# Patient Record
Sex: FEMALE | Race: WHITE | NOT HISPANIC OR LATINO | Employment: OTHER | URBAN - METROPOLITAN AREA
[De-identification: names, ages, dates, MRNs, and addresses within clinical notes are randomized per-mention and may not be internally consistent; named-entity substitution may affect disease eponyms.]

---

## 2017-01-04 ENCOUNTER — TRANSCRIBE ORDERS (OUTPATIENT)
Dept: LAB | Facility: CLINIC | Age: 72
End: 2017-01-04

## 2017-01-04 ENCOUNTER — APPOINTMENT (OUTPATIENT)
Dept: LAB | Facility: CLINIC | Age: 72
End: 2017-01-04
Payer: MEDICARE

## 2017-01-04 ENCOUNTER — ALLSCRIPTS OFFICE VISIT (OUTPATIENT)
Dept: OTHER | Facility: OTHER | Age: 72
End: 2017-01-04

## 2017-01-04 DIAGNOSIS — R19.7 DIARRHEA: ICD-10-CM

## 2017-01-04 LAB
ALBUMIN SERPL BCP-MCNC: 3.5 G/DL (ref 3.5–5)
ALP SERPL-CCNC: 67 U/L (ref 46–116)
ALT SERPL W P-5'-P-CCNC: 29 U/L (ref 12–78)
ANION GAP SERPL CALCULATED.3IONS-SCNC: 5 MMOL/L (ref 4–13)
AST SERPL W P-5'-P-CCNC: 27 U/L (ref 5–45)
BASOPHILS # BLD AUTO: 0.03 THOUSANDS/ΜL (ref 0–0.1)
BASOPHILS NFR BLD AUTO: 1 % (ref 0–1)
BILIRUB SERPL-MCNC: 0.45 MG/DL (ref 0.2–1)
BUN SERPL-MCNC: 21 MG/DL (ref 5–25)
CALCIUM SERPL-MCNC: 8.8 MG/DL (ref 8.3–10.1)
CHLORIDE SERPL-SCNC: 105 MMOL/L (ref 100–108)
CO2 SERPL-SCNC: 29 MMOL/L (ref 21–32)
CREAT SERPL-MCNC: 0.66 MG/DL (ref 0.6–1.3)
EOSINOPHIL # BLD AUTO: 0.3 THOUSAND/ΜL (ref 0–0.61)
EOSINOPHIL NFR BLD AUTO: 5 % (ref 0–6)
ERYTHROCYTE [DISTWIDTH] IN BLOOD BY AUTOMATED COUNT: 13.5 % (ref 11.6–15.1)
GFR SERPL CREATININE-BSD FRML MDRD: >60 ML/MIN/1.73SQ M
GLUCOSE SERPL-MCNC: 106 MG/DL (ref 65–140)
HCT VFR BLD AUTO: 40.4 % (ref 34.8–46.1)
HEMOCCULT STL QL IA: NEGATIVE
HGB BLD-MCNC: 13.7 G/DL (ref 11.5–15.4)
LYMPHOCYTES # BLD AUTO: 1.53 THOUSANDS/ΜL (ref 0.6–4.47)
LYMPHOCYTES NFR BLD AUTO: 28 % (ref 14–44)
MCH RBC QN AUTO: 30.5 PG (ref 26.8–34.3)
MCHC RBC AUTO-ENTMCNC: 33.9 G/DL (ref 31.4–37.4)
MCV RBC AUTO: 90 FL (ref 82–98)
MONOCYTES # BLD AUTO: 0.44 THOUSAND/ΜL (ref 0.17–1.22)
MONOCYTES NFR BLD AUTO: 8 % (ref 4–12)
NEUTROPHILS # BLD AUTO: 3.27 THOUSANDS/ΜL (ref 1.85–7.62)
NEUTS SEG NFR BLD AUTO: 58 % (ref 43–75)
NRBC BLD AUTO-RTO: 0 /100 WBCS
PLATELET # BLD AUTO: 141 THOUSANDS/UL (ref 149–390)
PMV BLD AUTO: 11.5 FL (ref 8.9–12.7)
POTASSIUM SERPL-SCNC: 4 MMOL/L (ref 3.5–5.3)
PROT SERPL-MCNC: 7 G/DL (ref 6.4–8.2)
RBC # BLD AUTO: 4.49 MILLION/UL (ref 3.81–5.12)
SODIUM SERPL-SCNC: 139 MMOL/L (ref 136–145)
WBC # BLD AUTO: 5.57 THOUSAND/UL (ref 4.31–10.16)

## 2017-01-04 PROCEDURE — 85025 COMPLETE CBC W/AUTO DIFF WBC: CPT

## 2017-01-04 PROCEDURE — 80053 COMPREHEN METABOLIC PANEL: CPT

## 2017-01-04 PROCEDURE — 36415 COLL VENOUS BLD VENIPUNCTURE: CPT

## 2017-01-04 PROCEDURE — 89055 LEUKOCYTE ASSESSMENT FECAL: CPT

## 2017-01-04 PROCEDURE — G0328 FECAL BLOOD SCRN IMMUNOASSAY: HCPCS

## 2017-01-06 LAB — WBC SPEC QL GRAM STN: NORMAL

## 2017-01-18 ENCOUNTER — GENERIC CONVERSION - ENCOUNTER (OUTPATIENT)
Dept: OTHER | Facility: OTHER | Age: 72
End: 2017-01-18

## 2017-02-23 ENCOUNTER — APPOINTMENT (OUTPATIENT)
Dept: LAB | Facility: CLINIC | Age: 72
End: 2017-02-23
Payer: MEDICARE

## 2017-02-23 ENCOUNTER — TRANSCRIBE ORDERS (OUTPATIENT)
Dept: LAB | Facility: CLINIC | Age: 72
End: 2017-02-23

## 2017-02-23 DIAGNOSIS — M81.0 OSTEOPOROSIS, UNSPECIFIED: Primary | ICD-10-CM

## 2017-02-23 PROCEDURE — 82570 ASSAY OF URINE CREATININE: CPT | Performed by: OBSTETRICS & GYNECOLOGY

## 2017-02-23 PROCEDURE — 82523 COLLAGEN CROSSLINKS: CPT | Performed by: OBSTETRICS & GYNECOLOGY

## 2017-02-24 LAB
COLLAGEN NTX UR-SCNC: 196 NMOL BCE
COLLAGEN NTX/CREAT UR-SRTO: 25 NM BCE/MM CR (ref 0–89)
CREAT UR-MCNC: 90 MG/DL
INTERPRETIVE GUIDE:: NORMAL

## 2017-03-08 ENCOUNTER — GENERIC CONVERSION - ENCOUNTER (OUTPATIENT)
Dept: OTHER | Facility: OTHER | Age: 72
End: 2017-03-08

## 2017-05-10 ENCOUNTER — APPOINTMENT (OUTPATIENT)
Dept: LAB | Facility: CLINIC | Age: 72
End: 2017-05-10
Payer: MEDICARE

## 2017-05-10 ENCOUNTER — ALLSCRIPTS OFFICE VISIT (OUTPATIENT)
Dept: OTHER | Facility: OTHER | Age: 72
End: 2017-05-10

## 2017-05-10 DIAGNOSIS — R10.9 ABDOMINAL PAIN: ICD-10-CM

## 2017-05-10 DIAGNOSIS — E78.5 HYPERLIPIDEMIA: ICD-10-CM

## 2017-05-10 DIAGNOSIS — Z00.00 ENCOUNTER FOR GENERAL ADULT MEDICAL EXAMINATION WITHOUT ABNORMAL FINDINGS: ICD-10-CM

## 2017-05-10 DIAGNOSIS — R53.83 OTHER FATIGUE: ICD-10-CM

## 2017-05-10 DIAGNOSIS — E66.3 OVERWEIGHT(278.02): ICD-10-CM

## 2017-05-10 LAB
ALBUMIN SERPL BCP-MCNC: 3.6 G/DL (ref 3.5–5)
ALP SERPL-CCNC: 72 U/L (ref 46–116)
ALT SERPL W P-5'-P-CCNC: 25 U/L (ref 12–78)
ANION GAP SERPL CALCULATED.3IONS-SCNC: 5 MMOL/L (ref 4–13)
AST SERPL W P-5'-P-CCNC: 26 U/L (ref 5–45)
BASOPHILS # BLD AUTO: 0.04 THOUSANDS/ΜL (ref 0–0.1)
BASOPHILS NFR BLD AUTO: 1 % (ref 0–1)
BILIRUB SERPL-MCNC: 0.4 MG/DL (ref 0.2–1)
BILIRUB UR QL STRIP: NEGATIVE
BUN SERPL-MCNC: 19 MG/DL (ref 5–25)
CALCIUM SERPL-MCNC: 9 MG/DL (ref 8.3–10.1)
CHLORIDE SERPL-SCNC: 105 MMOL/L (ref 100–108)
CHOLEST SERPL-MCNC: 185 MG/DL (ref 50–200)
CLARITY UR: CLEAR
CO2 SERPL-SCNC: 30 MMOL/L (ref 21–32)
COLOR UR: YELLOW
CREAT SERPL-MCNC: 0.59 MG/DL (ref 0.6–1.3)
EOSINOPHIL # BLD AUTO: 0.23 THOUSAND/ΜL (ref 0–0.61)
EOSINOPHIL NFR BLD AUTO: 4 % (ref 0–6)
ERYTHROCYTE [DISTWIDTH] IN BLOOD BY AUTOMATED COUNT: 13.6 % (ref 11.6–15.1)
GFR SERPL CREATININE-BSD FRML MDRD: >60 ML/MIN/1.73SQ M
GLUCOSE P FAST SERPL-MCNC: 100 MG/DL (ref 65–99)
GLUCOSE UR STRIP-MCNC: NEGATIVE MG/DL
HCT VFR BLD AUTO: 42.5 % (ref 34.8–46.1)
HDLC SERPL-MCNC: 56 MG/DL (ref 40–60)
HGB BLD-MCNC: 14.4 G/DL (ref 11.5–15.4)
HGB UR QL STRIP.AUTO: NEGATIVE
KETONES UR STRIP-MCNC: NEGATIVE MG/DL
LDLC SERPL CALC-MCNC: 102 MG/DL (ref 0–100)
LEUKOCYTE ESTERASE UR QL STRIP: NEGATIVE
LYMPHOCYTES # BLD AUTO: 1.92 THOUSANDS/ΜL (ref 0.6–4.47)
LYMPHOCYTES NFR BLD AUTO: 34 % (ref 14–44)
MCH RBC QN AUTO: 30.4 PG (ref 26.8–34.3)
MCHC RBC AUTO-ENTMCNC: 33.9 G/DL (ref 31.4–37.4)
MCV RBC AUTO: 90 FL (ref 82–98)
MONOCYTES # BLD AUTO: 0.38 THOUSAND/ΜL (ref 0.17–1.22)
MONOCYTES NFR BLD AUTO: 7 % (ref 4–12)
NEUTROPHILS # BLD AUTO: 3.13 THOUSANDS/ΜL (ref 1.85–7.62)
NEUTS SEG NFR BLD AUTO: 54 % (ref 43–75)
NITRITE UR QL STRIP: NEGATIVE
NRBC BLD AUTO-RTO: 0 /100 WBCS
PH UR STRIP.AUTO: 7 [PH] (ref 4.5–8)
PLATELET # BLD AUTO: 145 THOUSANDS/UL (ref 149–390)
PMV BLD AUTO: 11.4 FL (ref 8.9–12.7)
POTASSIUM SERPL-SCNC: 4.1 MMOL/L (ref 3.5–5.3)
PROT SERPL-MCNC: 7.2 G/DL (ref 6.4–8.2)
PROT UR STRIP-MCNC: NEGATIVE MG/DL
RBC # BLD AUTO: 4.73 MILLION/UL (ref 3.81–5.12)
SODIUM SERPL-SCNC: 140 MMOL/L (ref 136–145)
SP GR UR STRIP.AUTO: 1.01 (ref 1–1.03)
TRIGL SERPL-MCNC: 135 MG/DL
TSH SERPL DL<=0.05 MIU/L-ACNC: 2.22 UIU/ML (ref 0.36–3.74)
UROBILINOGEN UR QL STRIP.AUTO: 0.2 E.U./DL
WBC # BLD AUTO: 5.71 THOUSAND/UL (ref 4.31–10.16)

## 2017-05-10 PROCEDURE — 36415 COLL VENOUS BLD VENIPUNCTURE: CPT

## 2017-05-10 PROCEDURE — 84443 ASSAY THYROID STIM HORMONE: CPT

## 2017-05-10 PROCEDURE — 85025 COMPLETE CBC W/AUTO DIFF WBC: CPT

## 2017-05-10 PROCEDURE — 80061 LIPID PANEL: CPT

## 2017-05-10 PROCEDURE — 81003 URINALYSIS AUTO W/O SCOPE: CPT

## 2017-05-10 PROCEDURE — 80053 COMPREHEN METABOLIC PANEL: CPT

## 2017-05-24 ENCOUNTER — APPOINTMENT (OUTPATIENT)
Dept: LAB | Facility: CLINIC | Age: 72
End: 2017-05-24
Payer: MEDICARE

## 2017-05-24 ENCOUNTER — TRANSCRIBE ORDERS (OUTPATIENT)
Dept: LAB | Facility: CLINIC | Age: 72
End: 2017-05-24

## 2017-05-24 DIAGNOSIS — L70.9 SAPHO SYNDROME (HCC): ICD-10-CM

## 2017-05-24 DIAGNOSIS — M85.80 SAPHO SYNDROME (HCC): ICD-10-CM

## 2017-05-24 DIAGNOSIS — L40.3 SAPHO SYNDROME (HCC): ICD-10-CM

## 2017-05-24 DIAGNOSIS — M86.9 SAPHO SYNDROME (HCC): ICD-10-CM

## 2017-05-24 DIAGNOSIS — E55.9 UNSPECIFIED VITAMIN D DEFICIENCY: Primary | ICD-10-CM

## 2017-05-24 DIAGNOSIS — M65.9 SAPHO SYNDROME (HCC): ICD-10-CM

## 2017-05-24 DIAGNOSIS — E55.9 UNSPECIFIED VITAMIN D DEFICIENCY: ICD-10-CM

## 2017-05-24 LAB — 25(OH)D3 SERPL-MCNC: 22.3 NG/ML (ref 30–100)

## 2017-05-24 PROCEDURE — 82306 VITAMIN D 25 HYDROXY: CPT

## 2017-05-24 PROCEDURE — 36415 COLL VENOUS BLD VENIPUNCTURE: CPT

## 2017-06-29 ENCOUNTER — ALLSCRIPTS OFFICE VISIT (OUTPATIENT)
Dept: OTHER | Facility: OTHER | Age: 72
End: 2017-06-29

## 2017-06-29 ENCOUNTER — APPOINTMENT (OUTPATIENT)
Dept: LAB | Facility: CLINIC | Age: 72
End: 2017-06-29
Payer: MEDICARE

## 2017-06-29 DIAGNOSIS — M54.50 LOW BACK PAIN: ICD-10-CM

## 2017-06-29 DIAGNOSIS — R30.0 DYSURIA: ICD-10-CM

## 2017-06-29 LAB
BILIRUB UR QL STRIP: NEGATIVE
CLARITY UR: CLEAR
COLOR UR: YELLOW
GLUCOSE UR STRIP-MCNC: NEGATIVE MG/DL
HGB UR QL STRIP.AUTO: NEGATIVE
KETONES UR STRIP-MCNC: NEGATIVE MG/DL
LEUKOCYTE ESTERASE UR QL STRIP: NEGATIVE
NITRITE UR QL STRIP: NEGATIVE
PH UR STRIP.AUTO: 6 [PH] (ref 4.5–8)
PROT UR STRIP-MCNC: NEGATIVE MG/DL
SP GR UR STRIP.AUTO: 1.03 (ref 1–1.03)
UROBILINOGEN UR QL STRIP.AUTO: 0.2 E.U./DL

## 2017-06-29 PROCEDURE — 81003 URINALYSIS AUTO W/O SCOPE: CPT

## 2017-06-29 PROCEDURE — 87086 URINE CULTURE/COLONY COUNT: CPT

## 2017-06-30 LAB — BACTERIA UR CULT: NORMAL

## 2017-07-06 ENCOUNTER — ALLSCRIPTS OFFICE VISIT (OUTPATIENT)
Dept: OTHER | Facility: OTHER | Age: 72
End: 2017-07-06

## 2017-08-02 ENCOUNTER — ALLSCRIPTS OFFICE VISIT (OUTPATIENT)
Dept: OTHER | Facility: OTHER | Age: 72
End: 2017-08-02

## 2017-08-02 DIAGNOSIS — E55.9 VITAMIN D DEFICIENCY: ICD-10-CM

## 2017-08-22 ENCOUNTER — APPOINTMENT (OUTPATIENT)
Dept: LAB | Facility: CLINIC | Age: 72
End: 2017-08-22
Payer: MEDICARE

## 2017-08-22 ENCOUNTER — TRANSCRIBE ORDERS (OUTPATIENT)
Dept: LAB | Facility: CLINIC | Age: 72
End: 2017-08-22

## 2017-08-22 DIAGNOSIS — E55.9 VITAMIN D DEFICIENCY: ICD-10-CM

## 2017-08-22 PROCEDURE — 82306 VITAMIN D 25 HYDROXY: CPT

## 2017-08-22 PROCEDURE — 36415 COLL VENOUS BLD VENIPUNCTURE: CPT

## 2017-08-25 LAB
25(OH)D2 SERPL-MCNC: 22 NG/ML
25(OH)D3 SERPL-MCNC: 19 NG/ML
25(OH)D3+25(OH)D2 SERPL-MCNC: 41 NG/ML

## 2017-12-05 ENCOUNTER — GENERIC CONVERSION - ENCOUNTER (OUTPATIENT)
Dept: OTHER | Facility: OTHER | Age: 72
End: 2017-12-05

## 2018-01-10 NOTE — RESULT NOTES
Verified Results  (1) URINALYSIS w URINE C/S REFLEX (will reflex a microscopy if leukocytes, occult blood, or nitrites are not within normal limits) 25Gfe3194 09:33AM Joe Stephne    Order Number: [id number]     Test Name Result Flag Reference   COLOR Dk Yellow     CLARITY Cloudy     PH UA 6 0  4 5-8 0   LEUKOCYTE ESTERASE UA Large A Negative   NITRITE UA Negative  Negative   PROTEIN UA Negative mg/dl  Negative   GLUCOSE UA Negative mg/dl  Negative   KETONES UA Negative mg/dl  Negative   UROBILINOGEN UA 0 2 E U /dl  0 2, 1 0 E U /dl   BILIRUBIN UA Negative  Negative   BLOOD UA Large A Negative   SPECIFIC GRAVITY UA 1 019  1 003-1 030   BACTERIA None Seen /hpf  None Seen, Occasional   EPITHELIAL CELLS None Seen /hpf  None Seen, Occasional   HYALINE CASTS 3-5 /lpf A None Seen   RBC UA Innumerable /hpf A None Seen   WBC UA Innumerable /hpf A None Seen
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
## 2018-01-12 VITALS
HEART RATE: 69 BPM | BODY MASS INDEX: 44.76 KG/M2 | WEIGHT: 222 LBS | SYSTOLIC BLOOD PRESSURE: 130 MMHG | HEIGHT: 59 IN | DIASTOLIC BLOOD PRESSURE: 80 MMHG | OXYGEN SATURATION: 94 %

## 2018-01-12 NOTE — RESULT NOTES
Verified Results  (1) URINALYSIS w URINE C/S REFLEX (will reflex a microscopy if leukocytes, occult blood, or nitrites are not within normal limits) 87Grl7941 09:33AM Chip Herring    Order Number: [id number]     Test Name Result Flag Reference   COLOR Dk Yellow     CLARITY Cloudy     PH UA 6 0  4 5-8 0   LEUKOCYTE ESTERASE UA Large A Negative   NITRITE UA Negative  Negative   PROTEIN UA Negative mg/dl  Negative   GLUCOSE UA Negative mg/dl  Negative   KETONES UA Negative mg/dl  Negative   UROBILINOGEN UA 0 2 E U /dl  0 2, 1 0 E U /dl   BILIRUBIN UA Negative  Negative   BLOOD UA Large A Negative   SPECIFIC GRAVITY UA 1 019  1 003-1 030   BACTERIA None Seen /hpf  None Seen, Occasional   EPITHELIAL CELLS None Seen /hpf  None Seen, Occasional   HYALINE CASTS 3-5 /lpf A None Seen   RBC UA Innumerable /hpf A None Seen   WBC UA Innumerable /hpf A None Seen   CLINICAL REPORT (Report)     Test:        Urine culture  Specimen Type:   Urine  Specimen Date:   9/28/2016 9:33 AM  Result Date:    9/30/2016 10:49 AM  Result Status:   Final result  Resulting Lab:    [id number] [facility] [id number]            Tel: [phone]      CULTURE                                       ------------------                                   10,000-19,000 cfu/ml Escherichia coli      SUSCEPTIBILITY                                   ------------------                                                       Escherichia coli  METHOD                 EMELINA  -------------------------------------  -------------------------  AMPICILLIN ($$)             <=8 00 ug/ml Susceptible  AZTREONAM ($$$)             <=8 ug/ml   Susceptible  CEFAZOLIN ($)              <=8 00 ug/ml Susceptible  CIPROFLOXACIN ($)            <=1 00 ug/ml Susceptible  GENTAMICIN ($$)             <=4 ug/ml   Susceptible  LEVOFLOXACIN ($)            <=2 00 ug/ml Susceptible  NITROFURANTOIN             <=32 ug/ml Susceptible  PIPERACILLIN + TAZOBACTAM ($$$)     <=16 ug/ml  Susceptible  TETRACYCLINE              <=4 ug/ml   Susceptible  TOBRAMYCIN ($)             <=4 ug/ml   Susceptible  TRIMETHOPRIM + SULFAMETHOXAZOLE ($$$)  <=2/38 ug/ml Susceptible

## 2018-01-13 NOTE — MISCELLANEOUS
History of Present Illness  TCM Communication [facility]: The patient is being contacted for follow-up after hospitalization  She was hospitalized at [facility]  The date of discharge: 3/17/16  She was discharged to home  Medications reviewed and updated today  Communication performed and completed by      Active Problems     1  Abdominal pain (789 00) (R10 9)   2  Chest pain (786 50) (R07 9)   3  Fatigue (780 79) (R53 83)   4  Hypercholesterolemia (272 0) (E78 0)   5  Hyperglycemia (790 29) (R73 9)   6  Lumbago (724 2) (M54 5)   7  Overweight (278 02) (E66 3)   8  Shortness of breath (786 05) (R06 02)   9  Skin rash (782 1) (R21)   10  Snoring (786 09) (R06 83)   11  Tiredness (780 79) (R53 83)   12  Visit for screening mammogram (V76 12) (Z12 31)    Depression (311) (F32 9)       Morbid obesity due to excess calories (278 01) (E66 01)          Past Medical History    1  History of Abnormal glucose (790 29) (R73 09)   2  History of Abnormal weight gain (783 1) (R63 5)   3  History of Colonoscopy (Fiberoptic)   4  History of Colonoscopy (Fiberoptic)   5  History of Echocardiogram   6  History of Encounter for screening mammogram for malignant neoplasm of breast   (V76 12) (Z12 31)   7  History of Encounter for screening mammogram for malignant neoplasm of breast   (V76 12) (Z12 31)   8  History of benign neoplasm of skin (V13 3) (Z87 2)   9  History of diverticulitis of colon (V12 79) (Z87 19)   10  History of fatigue (V13 89) (Z87 898)   11  History of hypercholesterolemia (V12 29) (Z86 39)   12  History of idiopathic urticaria (V13 3) (Z87 2)   13  History of intestinal obstruction (V12 79) (Z87 19)   14  History of urticaria (V13 3) (Z87 2)   15  History of Lyme disease (088 81) (A69 20)   16  History of Malaise (780 79) (R53 81)   17  History of Stress Test ECG Performed    Surgical History    1  History of Colon Surgery    Family History    1   No pertinent family history    Social History    · Denied: History of Alcohol Use (History)   · Never A Smoker    Current Meds   1  Aspirin 81 MG Oral Tablet Recorded   2  B Complex-C Oral Tablet; Therapy: (Recorded:20Jan2015) to Recorded   3  Centrum Silver TABS; Therapy: (Recorded:20Jan2015) to Recorded   4  Escitalopram Oxalate 20 MG Oral Tablet; take 1 tablet by mouth every day; Therapy: 93Yty2331 to (Mari Grade)  Requested for: 13TAT3667; Last   Rx:01Oct2015 Ordered    Allergies    1  No Known Drug Allergies    Health Management  Health Maintenance   * MAMMO SCREENING BILATERAL W CAD; every 1 year; Last 11YYC9353; Next Due: 43MSP4710; Active  COLONOSCOPY; every 5 weeks; Next Due: 09Xyt4610; Overdue  Digital Bilateral Diagnostic Mammogram With CAD; every 1 year; Next Due: 64QKW7250; Overdue    Future Appointments    Date/Time Provider Specialty Site   04/22/2016 09:15 AM GOYO Roy  Cardiology [facility] CARDIOLOGY [facility]   06/22/2016 04:00 PM Gretchen Cooks, DO Internal Medicine [facility]     Signatures   Electronically signed by : Faith Garcia, [facility]; Apr 20 2016  8:29AM EST                       (Author)    Electronically signed by : SINDY Parker; Apr 20 2016  8:29AM EST                       (Author)    Electronically signed by : Emir Jones DO;  Apr 20 2016  1:40PM EST                       (Co-author)

## 2018-01-14 VITALS
HEIGHT: 59 IN | SYSTOLIC BLOOD PRESSURE: 130 MMHG | BODY MASS INDEX: 44.76 KG/M2 | HEART RATE: 79 BPM | OXYGEN SATURATION: 98 % | DIASTOLIC BLOOD PRESSURE: 76 MMHG | WEIGHT: 222 LBS

## 2018-01-14 VITALS
HEART RATE: 72 BPM | BODY MASS INDEX: 43.8 KG/M2 | WEIGHT: 217.25 LBS | SYSTOLIC BLOOD PRESSURE: 120 MMHG | HEIGHT: 59 IN | DIASTOLIC BLOOD PRESSURE: 70 MMHG

## 2018-01-14 VITALS
HEART RATE: 81 BPM | HEIGHT: 59 IN | WEIGHT: 225 LBS | OXYGEN SATURATION: 92 % | DIASTOLIC BLOOD PRESSURE: 70 MMHG | SYSTOLIC BLOOD PRESSURE: 122 MMHG | BODY MASS INDEX: 45.36 KG/M2

## 2018-01-14 VITALS
SYSTOLIC BLOOD PRESSURE: 130 MMHG | HEART RATE: 68 BPM | WEIGHT: 220 LBS | HEIGHT: 59 IN | DIASTOLIC BLOOD PRESSURE: 90 MMHG | BODY MASS INDEX: 44.35 KG/M2

## 2018-01-24 VITALS
DIASTOLIC BLOOD PRESSURE: 82 MMHG | HEART RATE: 65 BPM | BODY MASS INDEX: 45.51 KG/M2 | OXYGEN SATURATION: 96 % | TEMPERATURE: 98 F | WEIGHT: 221.5 LBS | SYSTOLIC BLOOD PRESSURE: 136 MMHG

## 2018-03-05 DIAGNOSIS — E66.01 MORBID (SEVERE) OBESITY DUE TO EXCESS CALORIES (HCC): ICD-10-CM

## 2018-03-05 DIAGNOSIS — E78.5 HYPERLIPIDEMIA: ICD-10-CM

## 2018-03-05 DIAGNOSIS — F32.9 MAJOR DEPRESSIVE DISORDER, SINGLE EPISODE: ICD-10-CM

## 2018-03-05 DIAGNOSIS — R73.9 HYPERGLYCEMIA: ICD-10-CM

## 2018-03-19 ENCOUNTER — APPOINTMENT (OUTPATIENT)
Dept: LAB | Facility: CLINIC | Age: 73
End: 2018-03-19
Payer: MEDICARE

## 2018-03-19 DIAGNOSIS — R73.9 HYPERGLYCEMIA: ICD-10-CM

## 2018-03-19 DIAGNOSIS — E78.5 HYPERLIPIDEMIA: ICD-10-CM

## 2018-03-19 DIAGNOSIS — E66.01 MORBID (SEVERE) OBESITY DUE TO EXCESS CALORIES (HCC): ICD-10-CM

## 2018-03-19 DIAGNOSIS — F32.9 MAJOR DEPRESSIVE DISORDER, SINGLE EPISODE: ICD-10-CM

## 2018-03-19 LAB
ALBUMIN SERPL BCP-MCNC: 3.6 G/DL (ref 3.5–5)
ALP SERPL-CCNC: 63 U/L (ref 46–116)
ALT SERPL W P-5'-P-CCNC: 22 U/L (ref 12–78)
ANION GAP SERPL CALCULATED.3IONS-SCNC: 5 MMOL/L (ref 4–13)
AST SERPL W P-5'-P-CCNC: 25 U/L (ref 5–45)
BASOPHILS # BLD AUTO: 0.04 THOUSANDS/ΜL (ref 0–0.1)
BASOPHILS NFR BLD AUTO: 1 % (ref 0–1)
BILIRUB SERPL-MCNC: 0.57 MG/DL (ref 0.2–1)
BUN SERPL-MCNC: 20 MG/DL (ref 5–25)
CALCIUM SERPL-MCNC: 9.1 MG/DL (ref 8.3–10.1)
CHLORIDE SERPL-SCNC: 104 MMOL/L (ref 100–108)
CHOLEST SERPL-MCNC: 176 MG/DL (ref 50–200)
CO2 SERPL-SCNC: 30 MMOL/L (ref 21–32)
CREAT SERPL-MCNC: 0.65 MG/DL (ref 0.6–1.3)
EOSINOPHIL # BLD AUTO: 0.34 THOUSAND/ΜL (ref 0–0.61)
EOSINOPHIL NFR BLD AUTO: 6 % (ref 0–6)
ERYTHROCYTE [DISTWIDTH] IN BLOOD BY AUTOMATED COUNT: 13.5 % (ref 11.6–15.1)
EST. AVERAGE GLUCOSE BLD GHB EST-MCNC: 123 MG/DL
GFR SERPL CREATININE-BSD FRML MDRD: 89 ML/MIN/1.73SQ M
GLUCOSE P FAST SERPL-MCNC: 103 MG/DL (ref 65–99)
HBA1C MFR BLD: 5.9 % (ref 4.2–6.3)
HCT VFR BLD AUTO: 41.9 % (ref 34.8–46.1)
HDLC SERPL-MCNC: 54 MG/DL (ref 40–60)
HGB BLD-MCNC: 14.2 G/DL (ref 11.5–15.4)
LDLC SERPL CALC-MCNC: 91 MG/DL (ref 0–100)
LYMPHOCYTES # BLD AUTO: 1.79 THOUSANDS/ΜL (ref 0.6–4.47)
LYMPHOCYTES NFR BLD AUTO: 30 % (ref 14–44)
MCH RBC QN AUTO: 30.3 PG (ref 26.8–34.3)
MCHC RBC AUTO-ENTMCNC: 33.9 G/DL (ref 31.4–37.4)
MCV RBC AUTO: 89 FL (ref 82–98)
MONOCYTES # BLD AUTO: 0.42 THOUSAND/ΜL (ref 0.17–1.22)
MONOCYTES NFR BLD AUTO: 7 % (ref 4–12)
NEUTROPHILS # BLD AUTO: 3.38 THOUSANDS/ΜL (ref 1.85–7.62)
NEUTS SEG NFR BLD AUTO: 56 % (ref 43–75)
NRBC BLD AUTO-RTO: 0 /100 WBCS
PLATELET # BLD AUTO: 142 THOUSANDS/UL (ref 149–390)
PMV BLD AUTO: 11.1 FL (ref 8.9–12.7)
POTASSIUM SERPL-SCNC: 4 MMOL/L (ref 3.5–5.3)
PROT SERPL-MCNC: 7.2 G/DL (ref 6.4–8.2)
RBC # BLD AUTO: 4.69 MILLION/UL (ref 3.81–5.12)
SODIUM SERPL-SCNC: 139 MMOL/L (ref 136–145)
TRIGL SERPL-MCNC: 153 MG/DL
WBC # BLD AUTO: 5.98 THOUSAND/UL (ref 4.31–10.16)

## 2018-03-19 PROCEDURE — 83036 HEMOGLOBIN GLYCOSYLATED A1C: CPT

## 2018-03-19 PROCEDURE — 80053 COMPREHEN METABOLIC PANEL: CPT

## 2018-03-19 PROCEDURE — 85025 COMPLETE CBC W/AUTO DIFF WBC: CPT

## 2018-03-19 PROCEDURE — 80061 LIPID PANEL: CPT

## 2018-03-19 PROCEDURE — 36415 COLL VENOUS BLD VENIPUNCTURE: CPT

## 2018-04-09 RX ORDER — ESCITALOPRAM OXALATE 20 MG/1
1 TABLET ORAL DAILY
COMMUNITY
Start: 2014-09-03 | End: 2018-11-27 | Stop reason: SDUPTHER

## 2018-04-09 RX ORDER — MELOXICAM 7.5 MG/1
1 TABLET ORAL EVERY 12 HOURS PRN
COMMUNITY
Start: 2017-06-29 | End: 2018-04-10 | Stop reason: ALTCHOICE

## 2018-04-09 RX ORDER — METAXALONE 800 MG/1
1 TABLET ORAL EVERY 8 HOURS PRN
COMMUNITY
Start: 2017-06-29 | End: 2018-04-10 | Stop reason: ALTCHOICE

## 2018-04-09 RX ORDER — MULTIVIT WITH MINERALS/LUTEIN
TABLET ORAL DAILY
COMMUNITY

## 2018-04-09 RX ORDER — ERGOCALCIFEROL 1.25 MG/1
1 CAPSULE ORAL WEEKLY
COMMUNITY
Start: 2017-06-29 | End: 2019-04-03 | Stop reason: ALTCHOICE

## 2018-04-10 ENCOUNTER — OFFICE VISIT (OUTPATIENT)
Dept: INTERNAL MEDICINE CLINIC | Facility: CLINIC | Age: 73
End: 2018-04-10
Payer: MEDICARE

## 2018-04-10 VITALS
BODY MASS INDEX: 44.96 KG/M2 | HEART RATE: 82 BPM | DIASTOLIC BLOOD PRESSURE: 80 MMHG | OXYGEN SATURATION: 93 % | SYSTOLIC BLOOD PRESSURE: 118 MMHG | HEIGHT: 59 IN | WEIGHT: 223 LBS | RESPIRATION RATE: 18 BRPM

## 2018-04-10 DIAGNOSIS — G47.39 OTHER SLEEP APNEA: ICD-10-CM

## 2018-04-10 DIAGNOSIS — E78.5 BORDERLINE HYPERLIPIDEMIA: ICD-10-CM

## 2018-04-10 DIAGNOSIS — Z01.419 WOMEN'S ANNUAL ROUTINE GYNECOLOGICAL EXAMINATION: ICD-10-CM

## 2018-04-10 DIAGNOSIS — E66.01 MORBID OBESITY DUE TO EXCESS CALORIES (HCC): Primary | ICD-10-CM

## 2018-04-10 DIAGNOSIS — R73.9 HYPERGLYCEMIA: ICD-10-CM

## 2018-04-10 DIAGNOSIS — F32.A DEPRESSION, UNSPECIFIED DEPRESSION TYPE: ICD-10-CM

## 2018-04-10 DIAGNOSIS — G47.30 SLEEP APNEA, UNSPECIFIED TYPE: ICD-10-CM

## 2018-04-10 PROCEDURE — 99214 OFFICE O/P EST MOD 30 MIN: CPT | Performed by: INTERNAL MEDICINE

## 2018-04-10 RX ORDER — CETIRIZINE HYDROCHLORIDE 10 MG/1
10 TABLET ORAL DAILY
COMMUNITY
End: 2019-08-21 | Stop reason: SDUPTHER

## 2018-04-11 ENCOUNTER — OFFICE VISIT (OUTPATIENT)
Dept: OBGYN CLINIC | Age: 73
End: 2018-04-11
Payer: MEDICARE

## 2018-04-11 VITALS
SYSTOLIC BLOOD PRESSURE: 132 MMHG | DIASTOLIC BLOOD PRESSURE: 80 MMHG | HEIGHT: 59 IN | BODY MASS INDEX: 45.56 KG/M2 | WEIGHT: 226 LBS

## 2018-04-11 DIAGNOSIS — Z01.419 ENCOUNTER FOR GYNECOLOGICAL EXAMINATION WITHOUT ABNORMAL FINDING: ICD-10-CM

## 2018-04-11 DIAGNOSIS — Z78.0 POSTMENOPAUSAL: ICD-10-CM

## 2018-04-11 DIAGNOSIS — Z12.31 ENCOUNTER FOR SCREENING MAMMOGRAM FOR MALIGNANT NEOPLASM OF BREAST: Primary | ICD-10-CM

## 2018-04-11 PROBLEM — G47.39 OTHER SLEEP APNEA: Status: ACTIVE | Noted: 2018-04-11

## 2018-04-11 PROCEDURE — G0101 CA SCREEN;PELVIC/BREAST EXAM: HCPCS | Performed by: NURSE PRACTITIONER

## 2018-04-11 NOTE — PATIENT INSTRUCTIONS
Menopause   WHAT YOU NEED TO KNOW:   What is menopause? Menopause is a normal stage in a woman's life when her monthly periods stop  Menopause starts when the ovaries slowly stop making the female hormones estrogen and progesterone  After menopause, a woman is no longer able to become pregnant  A woman who has not had a period for a full year after the age of 39 is considered to be in menopause  Perimenopause is a stage before menopause that may cause signs and symptoms similar to menopause  Perimenopause can last an average of 4 to 5 years  What are the signs and symptoms of menopause? The signs and symptoms of menopause can be different from woman to woman:  · Menstrual period changes such as skipped periods or periods that are closer together, or lighter or heavier than usual    · Hot flashes (feeling warm, flushed, and sweaty)     · Mood changes such as irritability or decreased desire to have sex    · Breast changes such as tenderness or pain    · Hair changes such as thinning hair or increased hair on your face    · Vaginal changes such as increased dryness     · Urinary changes such as increased urinary tract infections (UTIs) or urgency (feeling that you need to urinate right away)    · Other symptoms such as headaches, trouble sleeping, fatigue, or heart palpitations (strong, fast heartbeats)  What do I need to know about menopause? · You can still get pregnant while you have periods  Continue to use birth control if you do not want to get pregnant  You may need to use birth control until it has been 1 year since your periods stopped  Ask your healthcare provider when you can stop using birth control to prevent pregnancy  · Hormone replacement therapy can be used to treat symptoms of menopause  Hormone replacement therapy (HRT) is medicine that replaces your low hormone levels  HRT contains estrogen and sometimes progestin  HRT has benefits and risks   HRT decreases your risk for bone fractures by helping to prevent osteoporosis  HRT also protects you from colon cancer  HRT may increase your risk for breast cancer, blood clots, heart disease, and stroke  Ask your healthcare provider if HRT is right for you  How can I live a healthy lifestyle during and after menopause? After menopause, your risk for heart disease and bone loss increases  Ask about these and other ways to stay healthy:  · Exercise regularly  Exercise helps you maintain a healthy weight  Exercise can also help to control your blood pressure and cholesterol levels  Include weight-bearing exercise for strong bones  Ask your healthcare provider about the best exercise plan for you  · Eat a variety of healthy foods  Include fruits, vegetables, whole grains (whole-wheat bread, pasta, and cereals), low-fat dairy, and lean protein foods (beans, poultry, and fish)  Limit foods high in sodium (salt)  Ask your healthcare provider for more information about a meal plan that is right for you  · Maintain a healthy weight  Check with your healthcare provider before you start any weight loss program      · Take supplements as directed  You may need extra calcium and vitamin D to help prevent osteoporosis  · Limit alcohol and caffeine  Alcohol and caffeine may worsen your symptoms  · Do not smoke  If you smoke, it is never too late to quit  You are more likely to have a heart attack, lung disease, blood clots, and cancer if you smoke  Ask your healthcare provider for information if you need help quitting  When should I contact my healthcare provider? · You have vaginal bleeding after menopause  · You have questions or concerns about your condition or care  CARE AGREEMENT:   You have the right to help plan your care  Learn about your health condition and how it may be treated  Discuss treatment options with your caregivers to decide what care you want to receive  You always have the right to refuse treatment   The above information is an  only  It is not intended as medical advice for individual conditions or treatments  Talk to your doctor, nurse or pharmacist before following any medical regimen to see if it is safe and effective for you  © 2017 [employer] Information is for End User's use only and may not be sold, redistributed or otherwise used for commercial purposes  All illustrations and images included in CareNotes® are the copyrighted property of A D A M [employer] Inc  or Jan Quintero

## 2018-04-11 NOTE — PROGRESS NOTES
Assessment/Plan:  Regarding her hyperglycemia she needs dietary measures  Her A1c is 5 6  Her serum cholesterol is fairly stable with a total cholesterol 176  She is markedly obese  She is interested in participating in the weight loss program   I have given a referral for the weight loss program     She is concerned that she may have sleep apnea  She tells me that her  sees that she has periods of apnea  She snores a lot  For this reason she is going to be referred to pulmonology for consideration to sleep study  Her depression despite her multiple issues is under fair control the present time  She does worry a lot about her son who remains quite ill and not able to work at the present time  I am going to see her in stiff 6-8 weeks  I hope to see that she is participating in weight loss program at that time  Approximately 35 minutes spent with this patient half of which was spent in counseling  No problem-specific Assessment & Plan notes found for this encounter  Diagnoses and all orders for this visit:    Morbid obesity due to excess calories ([facility] [address] )  -     Ambulatory referral to Weight Management; Future    Hyperglycemia    Depression, unspecified depression type    Borderline hyperlipidemia    Sleep apnea, unspecified type  -     Ambulatory referral to Pulmonology; Future    Women's annual routine gynecological examination  -     Ambulatory referral to Gynecology; Future    Other orders  -      Colonoscopy  -      Mammography  -     aspirin 81 MG tablet; Take 81 mg by mouth daily  -     VITAMIN B COMPLEX-C PO; Take by mouth  -     Multiple Vitamins-Minerals (CENTRUM SILVER) tablet; Take by mouth  -     escitalopram (LEXAPRO) 20 mg tablet; Take 1 tablet by mouth daily  -     Discontinue: meloxicam (MOBIC) 7 5 mg tablet; Take 1 tablet by mouth every 12 (twelve) hours as needed  -     Discontinue: metaxalone (SKELAXIN) 800 mg tablet;  Take 1 tablet by mouth every 8 (eight) hours as needed  -     hydrocortisone (PROCTOSOL HC) 2 5 % rectal cream; Insert into the rectum daily as needed  -     ergocalciferol (VITAMIN D2) 50,000 units; Take 1 capsule by mouth once a week  -     cholecalciferol (VITAMIN D3) 1,000 units tablet; Take 1 tablet by mouth 2 (two) times a day  -     cetirizine (ZyrTEC) 10 mg tablet; Take 10 mg by mouth daily          Subjective:  Problems are 1  Hyperglycemia 2  Hyperlipidemia 3  Depression 4  Obesity 5  Possible sleep apnea     Patient ID: Yi Foy is a 68 y o  female  HPI she comes in for follow-up  She still having a lot of stress in life  Her son remains very ill on off  It is a great worry to her  She remains markedly overweight  She has hyperglycemia with an A1c of 5 6  Her cholesterol is actually good  She is on Lexapro which seems to help  She does have some arthritis  The following portions of the patient's history were reviewed and updated as appropriate: allergies, current medications, past family history, past medical history, past social history, past surgical history and problem list     Review of Systems   Constitutional: Negative for activity change, appetite change, chills, diaphoresis, fatigue, fever and unexpected weight change  She understandably has a lot of worries about her son  HENT: Negative for congestion, ear pain, hearing loss, mouth sores, nosebleeds, postnasal drip, sinus pain, sinus pressure, sore throat and trouble swallowing  Eyes: Negative for pain, discharge and visual disturbance  Respiratory: Negative for apnea, cough, chest tightness, shortness of breath and wheezing  She snores a lot and has apneic episodes at night according to her   Cardiovascular: Negative for chest pain, palpitations and leg swelling  Gastrointestinal: Negative for abdominal pain, anal bleeding, blood in stool, constipation, diarrhea, nausea and vomiting     Endocrine: Negative for polydipsia and polyphagia  She has hyperglycemia  She remains markedly obese  Genitourinary: Negative for decreased urine volume, dysuria, flank pain, frequency, hematuria and urgency  Musculoskeletal: Negative for arthralgias, back pain, gait problem, joint swelling and myalgias  Skin: Negative for rash and wound  Allergic/Immunologic: Negative for environmental allergies and food allergies  Neurological: Negative for dizziness, tremors, seizures, syncope, speech difficulty, light-headedness, numbness and headaches  Hematological: Negative for adenopathy  Does not bruise/bleed easily  Psychiatric/Behavioral: Negative for agitation, confusion, hallucinations, sleep disturbance and suicidal ideas  The patient is not nervous/anxious  Objective:     Physical Exam   Constitutional: No distress  Her BMI is 45 8  HENT:   Head: Normocephalic  Right Ear: External ear normal    Left Ear: External ear normal    Nose: Nose normal    Mouth/Throat: Oropharynx is clear and moist  No oropharyngeal exudate  Eyes: Conjunctivae and EOM are normal  Pupils are equal, round, and reactive to light  Right eye exhibits no discharge  Left eye exhibits no discharge  Neck: Normal range of motion  Neck supple  No thyromegaly present  Cardiovascular: Normal rate, regular rhythm, normal heart sounds and intact distal pulses  Exam reveals no gallop and no friction rub  No murmur heard  Heart tones are somewhat distant  Pulmonary/Chest: Effort normal and breath sounds normal  No respiratory distress  She has no wheezes  She has no rales  Abdominal: Soft  Bowel sounds are normal  She exhibits no distension and no mass  There is no tenderness  There is no rebound and no guarding  She is markedly obese  Musculoskeletal: Normal range of motion  She exhibits no edema, tenderness or deformity  She has some arthritic changes of her knees  Lymphadenopathy:     She has no cervical adenopathy  Neurological: She is alert  She has normal reflexes  No cranial nerve deficit  Coordination normal    Skin: Skin is warm and dry  No rash noted  No erythema  Psychiatric: She has a normal mood and affect  Her behavior is normal  Judgment and thought content normal    Nursing note and vitals reviewed

## 2018-04-11 NOTE — PROGRESS NOTES
Assessment/Plan:    No problem-specific Assessment & Plan notes found for this encounter  Diagnoses and all orders for this visit:    Encounter for screening mammogram for malignant neoplasm of breast  -     Mammo screening bilateral w 3d & cad; Future    Encounter for gynecological examination without abnormal finding    Postmenopausal  -     DXA bone density spine hip and pelvis; Future    Other orders  -     Misc Natural Products (FIBER 7) POWD; Take by mouth        Call as needed, encouraged calcium/vit D supplementation, all questions answered    Subjective:      Patient ID: Amaya Khalil is a 68 y o  female  Pleasant 68 y o  postmenopausal female here for annual exam  She denies postmenopausal bleeding  Denies history of abnormal pap smears, no pap today  Denies vaginal issues  Denies pelvic pain  Denies menopausal/postmenopausal issues  Rarely sexually active  The following portions of the patient's history were reviewed and updated as appropriate:   She  has a past medical history of Abnormal glucose; Benign neoplasm of skin; Diverticulitis; Hypercholesterolemia; and Lyme disease  She   Patient Active Problem List    Diagnosis Date Noted    Other sleep apnea 04/11/2018    Postmenopausal 04/11/2018    Encounter for gynecological examination without abnormal finding 04/11/2018    Borderline hyperlipidemia 05/10/2017    Hemorrhoids 09/28/2016    Osteoporosis 08/24/2016    Morbid obesity due to excess calories ([facility] [address] ) 03/09/2016    Depression 01/16/2014    Hyperglycemia 01/16/2014     She  has a past surgical history that includes Colonoscopy; Colectomy; and Hernia repair  Her family history includes Cancer in her father; No Known Problems in her mother  She  reports that she has never smoked  She has never used smokeless tobacco  She reports that she does not drink alcohol or use drugs    Current Outpatient Prescriptions   Medication Sig Dispense Refill    cetirizine (ZyrTEC) 10 mg tablet Take 10 mg by mouth daily      cholecalciferol (VITAMIN D3) 1,000 units tablet Take 1 tablet by mouth 2 (two) times a day      ergocalciferol (VITAMIN D2) 50,000 units Take 1 capsule by mouth once a week      escitalopram (LEXAPRO) 20 mg tablet Take 1 tablet by mouth daily      hydrocortisone (PROCTOSOL HC) 2 5 % rectal cream Insert into the rectum daily as needed      Misc Natural Products (FIBER 7) POWD Take by mouth      Multiple Vitamins-Minerals (CENTRUM SILVER) tablet Take by mouth      VITAMIN B COMPLEX-C PO Take by mouth      aspirin 81 MG tablet Take 81 mg by mouth daily       No current facility-administered medications for this visit  She has No Known Allergies  OB History    Para Term  AB Living   2 2 2     2   SAB TAB Ectopic Multiple Live Births           2      # Outcome Date GA Lbr Jaziel/2nd Weight Sex Delivery Anes PTL Lv   2 Term            1 Term                 2 sons, 6 grands (4 in [place])  Review of Systems   Constitutional: Negative for chills, fatigue, fever and unexpected weight change  Respiratory: Negative for shortness of breath  Gastrointestinal: Negative for anal bleeding, blood in stool, constipation and diarrhea  Genitourinary: Negative for difficulty urinating, dysuria and hematuria  Objective:      /80 (BP Location: Left arm, Patient Position: Sitting)   Ht 4' 10 5" (1 486 m)   Wt 103 kg (226 lb)   Breastfeeding? No   BMI 46 43 kg/m²          Physical Exam   Constitutional: She appears well-developed and well-nourished  No distress  HENT:   Head: Normocephalic  Neck: Normal range of motion  Neck supple  Pulmonary/Chest: Effort normal  Right breast exhibits no inverted nipple, no mass, no nipple discharge, no skin change and no tenderness  Left breast exhibits no inverted nipple, no mass, no nipple discharge, no skin change and no tenderness  Breasts are symmetrical    Abdominal: Soft     Genitourinary: No breast swelling, tenderness, discharge or bleeding  Pelvic exam was performed with patient supine  No labial fusion  There is no rash, tenderness, lesion or injury on the right labia  There is no rash, tenderness, lesion or injury on the left labia  Uterus is not deviated, not enlarged, not fixed and not tender  Cervix exhibits no motion tenderness, no discharge and no friability  Right adnexum displays no mass, no tenderness and no fullness  Left adnexum displays no mass, no tenderness and no fullness  No erythema or tenderness in the vagina  No foreign body in the vagina  No signs of injury around the vagina  No vaginal discharge found  Lymphadenopathy:        Right: No inguinal adenopathy present  Left: No inguinal adenopathy present

## 2018-06-22 ENCOUNTER — OFFICE VISIT (OUTPATIENT)
Dept: PULMONOLOGY | Facility: CLINIC | Age: 73
End: 2018-06-22
Payer: MEDICARE

## 2018-06-22 VITALS
OXYGEN SATURATION: 98 % | HEIGHT: 59 IN | HEART RATE: 82 BPM | BODY MASS INDEX: 45.56 KG/M2 | SYSTOLIC BLOOD PRESSURE: 122 MMHG | DIASTOLIC BLOOD PRESSURE: 80 MMHG | WEIGHT: 226 LBS

## 2018-06-22 DIAGNOSIS — E66.01 MORBID (SEVERE) OBESITY DUE TO EXCESS CALORIES (HCC): ICD-10-CM

## 2018-06-22 DIAGNOSIS — G47.30 SLEEP APNEA, UNSPECIFIED TYPE: ICD-10-CM

## 2018-06-22 DIAGNOSIS — G47.33 OSA (OBSTRUCTIVE SLEEP APNEA): Primary | ICD-10-CM

## 2018-06-22 DIAGNOSIS — G47.19 EXCESSIVE DAYTIME SLEEPINESS: ICD-10-CM

## 2018-06-22 PROCEDURE — 99204 OFFICE O/P NEW MOD 45 MIN: CPT | Performed by: INTERNAL MEDICINE

## 2018-06-22 NOTE — PROGRESS NOTES
Assessment/Plan:     Diagnoses and all orders for this visit:    CASYE (obstructive sleep apnea)  -     Home Study; Future    Excessive daytime sleepiness    Sleep apnea, unspecified type  -     Ambulatory referral to Pulmonology    Morbid (severe) obesity due to excess calories ([address] )        obstructive sleep apnea Etiology pathogenesis discussed with the patient in detail  Patient has signs and symptoms of obstructive sleep apnea  Consequences of untreated sleep apnea including excessive daytime sleepiness and increased risk for myocardial infarction stroke atrial fibrillation discussed with the patient  Testing procedure was discussed in detail  She would like to get the home sleep study done, if there is evidence of abnormal nocturnal breathing she will undergo a CPAP titration study for maximal benefit  Cautioned against driving when sleepy  , epworth  sleepiness score 15  Recommend weight loss  Follow-up after the above testing  Return in about 3 months (around 9/22/2018)  All questions are answered to the patient's satisfaction and understanding  She verbalizes understanding  She is encouraged to call with any further questions or concerns  Portions of the record may have been created with voice recognition software  Occasional wrong word or "sound a like" substitutions may have occurred due to the inherent limitations of voice recognition software  Read the chart carefully and recognize, using context, where substitutions have occurred  Electronically Signed by Marj Bonilla MD    ______________________________________________________________________    Chief Complaint: No chief complaint on file  Patient ID: Zheng Crandall is a 68 y o  y o  female has a past medical history of Abnormal glucose; Benign neoplasm of skin; Diverticulitis; Hypercholesterolemia; and Lyme disease  6/22/2018  Patient is a very pleasant 28-year-old lady, here for evaluation for obstructive sleep apnea    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, 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  She states is history of very loud snoring as described by her , witnessed apneic spells as well as snorting choking and gasping for air  When she wakes up in the morning she states she is usually fresh, does have early morning headaches, as long as she is on her feet she does not doze off but when she is watching TV or reading a book doses of most of the time  Occupational/Exposure history:  Travel history:  Review of Systems   Constitutional: Positive for fatigue and unexpected weight change  Negative for appetite change, chills, diaphoresis and fever  HENT: Negative for congestion, ear discharge, ear pain, nosebleeds, postnasal drip, rhinorrhea, sinus pain, sore throat and voice change  Eyes: Negative for pain, discharge and visual disturbance  Respiratory: Negative for apnea, cough, choking, chest tightness, shortness of breath, wheezing and stridor  Snoring witnessed apneic spells and choking and gasping for air   Cardiovascular: Negative for chest pain, palpitations and leg swelling  Gastrointestinal: Negative for abdominal pain, blood in stool, constipation, diarrhea and vomiting  Endocrine: Negative for cold intolerance, heat intolerance, polydipsia, polyphagia and polyuria  Genitourinary: Negative for difficulty urinating and dysuria  Musculoskeletal: Negative for arthralgias and neck pain  Skin: Negative for pallor and rash  Allergic/Immunologic: Negative for environmental allergies and food allergies  Neurological: Negative for dizziness, speech difficulty, weakness and light-headedness  Hematological: Negative for adenopathy  Does not bruise/bleed easily  Psychiatric/Behavioral: Negative for agitation, confusion and sleep disturbance  The patient is not nervous/anxious  Social history: She reports that she has never smoked   She has never used smokeless tobacco  She reports that she does not drink alcohol or use drugs  Past surgical history:   Past Surgical History:   Procedure Laterality Date    COLECTOMY      COLONOSCOPY      COMPLETE  2006, 2012    HERNIA REPAIR       Family history:   Family History   Problem Relation Age of Onset    No Known Problems Mother     Cancer Father         Gallbladder    Breast cancer Neg Hx     Colon cancer Neg Hx     Ovarian cancer Neg Hx     Uterine cancer Neg Hx     Cervical cancer Neg Hx        Immunization History   Administered Date(s) Administered    Influenza 09/15/2009, 09/06/2012    Influenza Split High Dose Preservative Free IM 10/13/2017    Influenza TIV (IM) 09/21/2014, 11/27/2016    Pneumococcal Conjugate 13-Valent 03/02/2016    Pneumococcal Polysaccharide PPV23 05/21/2013    Tdap 1945, 06/28/2017    Zoster 05/21/2013     Current Outpatient Prescriptions   Medication Sig Dispense Refill    aspirin 81 MG tablet Take 81 mg by mouth daily      cetirizine (ZyrTEC) 10 mg tablet Take 10 mg by mouth daily      cholecalciferol (VITAMIN D3) 1,000 units tablet Take 1 tablet by mouth 2 (two) times a day      ergocalciferol (VITAMIN D2) 50,000 units Take 1 capsule by mouth once a week      escitalopram (LEXAPRO) 20 mg tablet Take 1 tablet by mouth daily      hydrocortisone (PROCTOSOL HC) 2 5 % rectal cream Insert into the rectum daily as needed      Misc Natural Products (FIBER 7) POWD Take by mouth      Multiple Vitamins-Minerals (CENTRUM SILVER) tablet Take by mouth      VITAMIN B COMPLEX-C PO Take by mouth       No current facility-administered medications for this visit  Allergies: Patient has no known allergies  Objective:  Vitals:    06/22/18 1128   BP: 122/80   Pulse: 82   SpO2: 98%   Weight: 103 kg (226 lb)   Height: 4' 10 5" (1 486 m)   Oxygen Therapy  SpO2: 98 %      Wt Readings from Last 3 Encounters:   06/22/18 103 kg (226 lb)   04/11/18 103 kg (226 lb)   04/10/18 101 kg (223 lb)     Body mass index is 46 43 kg/m²  Physical Exam   Constitutional: She is oriented to person, place, and time  She appears well-developed and well-nourished  HENT:   Head: Normocephalic and atraumatic  Crowded oropharyngeal airways, Mallampati score 4   Eyes: EOM are normal  Pupils are equal, round, and reactive to light  Neck: Normal range of motion  Neck supple  Short and wide neck   Cardiovascular: Normal rate, regular rhythm and normal heart sounds  Pulmonary/Chest: Effort normal and breath sounds normal    Abdominal: Soft  Bowel sounds are normal    Musculoskeletal: Normal range of motion  Neurological: She is alert and oriented to person, place, and time  Skin: Skin is warm and dry  Psychiatric: She has a normal mood and affect   Her behavior is normal        :     ESS: Total score: 15

## 2018-07-10 ENCOUNTER — OFFICE VISIT (OUTPATIENT)
Dept: INTERNAL MEDICINE CLINIC | Facility: CLINIC | Age: 73
End: 2018-07-10
Payer: MEDICARE

## 2018-07-10 VITALS
BODY MASS INDEX: 44.96 KG/M2 | SYSTOLIC BLOOD PRESSURE: 132 MMHG | OXYGEN SATURATION: 95 % | WEIGHT: 223 LBS | HEART RATE: 87 BPM | HEIGHT: 59 IN | DIASTOLIC BLOOD PRESSURE: 80 MMHG

## 2018-07-10 DIAGNOSIS — R73.9 HYPERGLYCEMIA: ICD-10-CM

## 2018-07-10 DIAGNOSIS — E55.9 VITAMIN D DEFICIENCY: ICD-10-CM

## 2018-07-10 DIAGNOSIS — E66.01 MORBID OBESITY DUE TO EXCESS CALORIES (HCC): ICD-10-CM

## 2018-07-10 DIAGNOSIS — F32.A DEPRESSION, UNSPECIFIED DEPRESSION TYPE: ICD-10-CM

## 2018-07-10 DIAGNOSIS — G47.39 OTHER SLEEP APNEA: Primary | ICD-10-CM

## 2018-07-10 PROCEDURE — 99214 OFFICE O/P EST MOD 30 MIN: CPT | Performed by: INTERNAL MEDICINE

## 2018-07-10 NOTE — PROGRESS NOTES
Assessment/Plan:  Regarding her weight issues she is going to look into the weight loss program     Regarding her depression she is stable at this time on Lexapro  Regarding her hyperglycemia this is going to be repeated in 3 months  Will see her in 3 months  She will continue her efforts to lose weight  No results found for this or any previous visit (from the past 1008 hour(s))  1  Other sleep apnea     2  Depression, unspecified depression type     3  Hyperglycemia  HEMOGLOBIN A1C W/ EAG ESTIMATION    Basic metabolic panel   4  Morbid obesity due to excess calories ([address] )     5  Vitamin D deficiency  Vitamin D 25 hydroxy       Orders Placed This Encounter   Procedures    Vitamin D 25 hydroxy    HEMOGLOBIN A1C W/ EAG ESTIMATION    Basic metabolic panel         Subjective:  Problems are 1  Obesity 2  Hyperglycemia 3  History of depression 4  Possible sleep apnea  Patient ID: Gabriele Williamson is a 68 y o  female  HPI she comes in for follow-up  The weight program has not been established to this point but she is going to check into it  She is awaiting her sleep study  She feels about the same  Weight is down 3 pounds  She has a great deal of difficulty losing weight  Denies any chest pain no significant shortness of breath  Up-to-date on health maintenance issues  The following portions of the patient's history were reviewed and updated as appropriate:   She has a past medical history of Abnormal glucose; Benign neoplasm of skin; Diverticulitis; Hypercholesterolemia; and Lyme disease  ,   does not have any pertinent problems on file  ,   has a past surgical history that includes Colonoscopy; Colectomy; and Hernia repair  ,  family history includes Cancer in her father; No Known Problems in her mother  ,   reports that she has never smoked  She has never used smokeless tobacco  She reports that she does not drink alcohol or use drugs  ,  has No Known Allergies       Current Outpatient Prescriptions:     aspirin 81 MG tablet, Take 81 mg by mouth daily, Disp: , Rfl:     cetirizine (ZyrTEC) 10 mg tablet, Take 10 mg by mouth daily, Disp: , Rfl:     cholecalciferol (VITAMIN D3) 1,000 units tablet, Take 1 tablet by mouth 2 (two) times a day, Disp: , Rfl:     ergocalciferol (VITAMIN D2) 50,000 units, Take 1 capsule by mouth once a week, Disp: , Rfl:     escitalopram (LEXAPRO) 20 mg tablet, Take 1 tablet by mouth daily, Disp: , Rfl:     hydrocortisone (PROCTOSOL HC) 2 5 % rectal cream, Insert into the rectum daily as needed, Disp: , Rfl:     Misc Natural Products (FIBER 7) POWD, Take by mouth, Disp: , Rfl:     Multiple Vitamins-Minerals (CENTRUM SILVER) tablet, Take by mouth, Disp: , Rfl:     VITAMIN B COMPLEX-C PO, Take by mouth, Disp: , Rfl:     Review of Systems   Constitutional: Negative for activity change, appetite change, chills, diaphoresis, fatigue, fever and unexpected weight change  HENT: Negative for congestion, ear pain, hearing loss, mouth sores, nosebleeds, postnasal drip, sinus pain, sinus pressure, sore throat and trouble swallowing  Eyes: Negative for pain, discharge and visual disturbance  Respiratory: Negative for apnea, cough, chest tightness, shortness of breath and wheezing  Cardiovascular: Negative for chest pain, palpitations and leg swelling  Gastrointestinal: Negative for abdominal pain, anal bleeding, blood in stool, constipation, diarrhea, nausea and vomiting  Endocrine: Negative for polydipsia and polyphagia  Genitourinary: Negative for decreased urine volume, dysuria, flank pain, frequency, hematuria and urgency  Musculoskeletal: Negative for arthralgias, back pain, gait problem, joint swelling and myalgias  Skin: Negative for rash and wound  Allergic/Immunologic: Negative for environmental allergies and food allergies     Neurological: Negative for dizziness, tremors, seizures, syncope, speech difficulty, light-headedness, numbness and headaches  Hematological: Negative for adenopathy  Does not bruise/bleed easily  Psychiatric/Behavioral: Negative for agitation, confusion, hallucinations, sleep disturbance and suicidal ideas  The patient is not nervous/anxious  Objective:  /80 (BP Location: Left arm)   Pulse 87   Ht 4' 10 5" (1 486 m)   Wt 101 kg (223 lb)   SpO2 95%   BMI 45 81 kg/m²      Physical Exam   Constitutional: No distress  Overweight female in no acute distress  Blood pressure is 120/82  Rhythm is regular  No murmur  HENT:   Head: Normocephalic  Right Ear: External ear normal    Left Ear: External ear normal    Nose: Nose normal    Mouth/Throat: Oropharynx is clear and moist  No oropharyngeal exudate  Eyes: Conjunctivae and EOM are normal  Pupils are equal, round, and reactive to light  Right eye exhibits no discharge  Left eye exhibits no discharge  Neck: Normal range of motion  Neck supple  No thyromegaly present  Cardiovascular: Normal rate, regular rhythm, normal heart sounds and intact distal pulses  Exam reveals no gallop and no friction rub  No murmur heard  Pulmonary/Chest: Effort normal and breath sounds normal  No respiratory distress  She has no wheezes  She has no rales  Abdominal: Soft  Bowel sounds are normal  She exhibits no distension and no mass  There is no tenderness  There is no rebound and no guarding  Abdomen is obese soft nontender with no masses   Musculoskeletal: Normal range of motion  She exhibits no edema, tenderness or deformity  Lymphadenopathy:     She has no cervical adenopathy  Neurological: She is alert  She has normal reflexes  No cranial nerve deficit  Coordination normal    Skin: Skin is warm and dry  No rash noted  No erythema  Psychiatric: She has a normal mood and affect  Her behavior is normal  Judgment and thought content normal    Nursing note and vitals reviewed

## 2018-09-26 ENCOUNTER — HOSPITAL ENCOUNTER (OUTPATIENT)
Dept: SLEEP CENTER | Facility: CLINIC | Age: 73
Discharge: HOME | End: 2018-09-26
Payer: MEDICARE

## 2018-09-26 DIAGNOSIS — G47.33 OSA (OBSTRUCTIVE SLEEP APNEA): ICD-10-CM

## 2018-09-26 PROCEDURE — G0399 HOME SLEEP TEST/TYPE 3 PORTA: HCPCS

## 2018-09-26 PROCEDURE — 95806 SLEEP STUDY UNATT&RESP EFFT: CPT | Performed by: INTERNAL MEDICINE

## 2018-10-01 ENCOUNTER — OFFICE VISIT (OUTPATIENT)
Dept: PULMONOLOGY | Facility: CLINIC | Age: 73
End: 2018-10-01
Payer: MEDICARE

## 2018-10-01 VITALS
HEIGHT: 59 IN | BODY MASS INDEX: 45.16 KG/M2 | OXYGEN SATURATION: 97 % | DIASTOLIC BLOOD PRESSURE: 78 MMHG | HEART RATE: 66 BPM | SYSTOLIC BLOOD PRESSURE: 132 MMHG | WEIGHT: 224 LBS

## 2018-10-01 DIAGNOSIS — G47.33 OSA (OBSTRUCTIVE SLEEP APNEA): Primary | ICD-10-CM

## 2018-10-01 DIAGNOSIS — E66.01 MORBID (SEVERE) OBESITY DUE TO EXCESS CALORIES (HCC): ICD-10-CM

## 2018-10-01 PROCEDURE — 99214 OFFICE O/P EST MOD 30 MIN: CPT | Performed by: INTERNAL MEDICINE

## 2018-10-01 NOTE — PROGRESS NOTES
Assessment/Plan:   Diagnoses and all orders for this visit:    CASEY (obstructive sleep apnea)  -     CPAP Auto New DME    Morbid (severe) obesity due to excess calories ([facility]CC)        Severe obstructive sleep apnea with an AHI of 31 3  Etiology pathogenesis of obstructive sleep apnea discussed in detail  The various  treatment options for obstructive sleep apnea with weight loss,Mandibular advancement devices, uvulopharyngoplasty and CPAP titration discussed  Given the severity of her sleep apnea, patient would like to going for the CPAP   She would like to have the Auto CPAP  Need for compliance with the CPAP machine discussed understands and verbalizes  Consequences of untreated sleep apnea discussed understands and verbalizes  She will start using the CPAP machine and if that is any concerns she will call our office  I will see her back in 3 months with the CPAP download compliance  Recommend weight loss    Return in about 3 months (around 1/1/2019)  All questions are answered to the patient's satisfaction and understanding  She verbalizes understanding  She is encouraged to call with any further questions or concerns  Portions of the record may have been created with voice recognition software  Occasional wrong word or "sound a like" substitutions may have occurred due to the inherent limitations of voice recognition software  Read the chart carefully and recognize, using context, where substitutions have occurred  Electronically Signed by Damion Penn MD    ______________________________________________________________________    Chief Complaint: No chief complaint on file  Patient ID: Jamie Leonardo is a 68 y o  y o  female has a past medical history of Abnormal glucose; Benign neoplasm of skin; Diverticulitis; Hypercholesterolemia; and Lyme disease  10/1/2018  Patient presents today for follow-up visit    Patient is a very pleasant 40-year-old lady, here for evaluation for obstructive sleep apnea   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, 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  She states is history of very loud snoring as described by her , witnessed apneic spells as well as snorting choking and gasping for air  When she wakes up in the morning she states she is usually fresh, does have early morning headaches, as long as she is on her feet she does not doze off but when she is watching TV or reading a book doses of most of the time  Here for follow-up         Review of Systems   Constitutional: Positive for fatigue and unexpected weight change  Negative for appetite change, chills, diaphoresis and fever  HENT: Negative for congestion, ear discharge, ear pain, nosebleeds, postnasal drip, rhinorrhea, sinus pain, sore throat and voice change  Eyes: Negative for pain, discharge and visual disturbance  Respiratory: Negative for apnea, cough, choking, chest tightness, shortness of breath, wheezing and stridor  Snoring witnessed apneic spells  Cardiovascular: Negative for chest pain, palpitations and leg swelling  Gastrointestinal: Negative for abdominal pain, blood in stool, constipation, diarrhea and vomiting  Endocrine: Negative for cold intolerance, heat intolerance, polydipsia, polyphagia and polyuria  Genitourinary: Negative for difficulty urinating and dysuria  Musculoskeletal: Negative for arthralgias and neck pain  Skin: Negative for pallor and rash  Allergic/Immunologic: Negative for environmental allergies and food allergies  Neurological: Negative for dizziness, speech difficulty, weakness and light-headedness  Hematological: Negative for adenopathy  Does not bruise/bleed easily  Psychiatric/Behavioral: Negative for agitation, confusion and sleep disturbance  The patient is not nervous/anxious  Smoking history: She reports that she has never smoked   She has never used smokeless tobacco     The following portions of the patient's history were reviewed and updated as appropriate: allergies, current medications, past family history, past medical history, past social history, past surgical history and problem list     Immunization History   Administered Date(s) Administered    Influenza 09/15/2009, 09/06/2012    Influenza Split High Dose Preservative Free IM 10/13/2017    Influenza TIV (IM) 09/21/2014, 11/27/2016    Pneumococcal Conjugate 13-Valent 03/02/2016    Pneumococcal Polysaccharide PPV23 05/21/2013    Tdap 1945, 06/28/2017    Zoster 05/21/2013     Current Outpatient Prescriptions   Medication Sig Dispense Refill    aspirin 81 MG tablet Take 81 mg by mouth daily      cetirizine (ZyrTEC) 10 mg tablet Take 10 mg by mouth daily      cholecalciferol (VITAMIN D3) 1,000 units tablet Take 1 tablet by mouth 2 (two) times a day      ergocalciferol (VITAMIN D2) 50,000 units Take 1 capsule by mouth once a week      escitalopram (LEXAPRO) 20 mg tablet Take 1 tablet by mouth daily      hydrocortisone (PROCTOSOL HC) 2 5 % rectal cream Insert into the rectum daily as needed      Misc Natural Products (FIBER 7) POWD Take by mouth      Multiple Vitamins-Minerals (CENTRUM SILVER) tablet Take by mouth      VITAMIN B COMPLEX-C PO Take by mouth       No current facility-administered medications for this visit  Allergies: Patient has no allergy information on record  Objective:  Vitals:    10/01/18 0847   BP: 132/78   Pulse: 66   SpO2: 97%   Weight: 102 kg (224 lb)   Height: 4' 10 5" (1 486 m)   Oxygen Therapy  SpO2: 97 %    Wt Readings from Last 3 Encounters:   10/01/18 102 kg (224 lb)   07/10/18 101 kg (223 lb)   06/22/18 103 kg (226 lb)     Body mass index is 46 02 kg/m²  Physical Exam   Constitutional: She is oriented to person, place, and time  She appears well-developed and well-nourished  HENT:   Head: Normocephalic and atraumatic     Crowded oropharyngeal airways, Mallampati score 3   Eyes: Pupils are equal, round, and reactive to light  EOM are normal    Neck: Normal range of motion  Neck supple  Short and wide neck   Cardiovascular: Normal rate, regular rhythm and normal heart sounds  Pulmonary/Chest: Effort normal and breath sounds normal    Musculoskeletal: Normal range of motion  Neurological: She is alert and oriented to person, place, and time  Skin: Skin is warm and dry  Psychiatric: She has a normal mood and affect   Her behavior is normal

## 2018-10-02 ENCOUNTER — TELEPHONE (OUTPATIENT)
Dept: PULMONOLOGY | Facility: CLINIC | Age: 73
End: 2018-10-02

## 2018-10-02 NOTE — TELEPHONE ENCOUNTER
When you can could you please finish reading her sleep study  I have to send this to [facility] with her order  Thank you !

## 2018-10-05 ENCOUNTER — TRANSCRIBE ORDERS (OUTPATIENT)
Dept: SLEEP CENTER | Facility: CLINIC | Age: 73
End: 2018-10-05

## 2018-10-05 ENCOUNTER — TELEPHONE (OUTPATIENT)
Dept: PULMONOLOGY | Facility: CLINIC | Age: 73
End: 2018-10-05

## 2018-10-05 NOTE — TELEPHONE ENCOUNTER
----- Message from Gopi Waters MD sent at 10/5/2018 11:57 AM EDT -----  Please fax auto CPAP order to [facility]  Thank you

## 2018-10-25 ENCOUNTER — APPOINTMENT (OUTPATIENT)
Dept: LAB | Facility: CLINIC | Age: 73
End: 2018-10-25
Payer: MEDICARE

## 2018-10-25 ENCOUNTER — TELEPHONE (OUTPATIENT)
Dept: INTERNAL MEDICINE CLINIC | Facility: CLINIC | Age: 73
End: 2018-10-25

## 2018-10-25 DIAGNOSIS — N39.0 URINARY TRACT INFECTION WITHOUT HEMATURIA, SITE UNSPECIFIED: Primary | ICD-10-CM

## 2018-10-25 DIAGNOSIS — N39.0 URINARY TRACT INFECTION WITHOUT HEMATURIA, SITE UNSPECIFIED: ICD-10-CM

## 2018-10-25 DIAGNOSIS — R73.9 HYPERGLYCEMIA: ICD-10-CM

## 2018-10-25 DIAGNOSIS — E55.9 VITAMIN D DEFICIENCY: ICD-10-CM

## 2018-10-25 LAB
25(OH)D3 SERPL-MCNC: 31.6 NG/ML (ref 30–100)
ANION GAP SERPL CALCULATED.3IONS-SCNC: 6 MMOL/L (ref 4–13)
BACTERIA UR QL AUTO: NORMAL /HPF
BILIRUB UR QL STRIP: NEGATIVE
BUN SERPL-MCNC: 21 MG/DL (ref 5–25)
CALCIUM SERPL-MCNC: 8.9 MG/DL (ref 8.3–10.1)
CHLORIDE SERPL-SCNC: 104 MMOL/L (ref 100–108)
CLARITY UR: CLEAR
CO2 SERPL-SCNC: 29 MMOL/L (ref 21–32)
COLOR UR: YELLOW
CREAT SERPL-MCNC: 0.68 MG/DL (ref 0.6–1.3)
EST. AVERAGE GLUCOSE BLD GHB EST-MCNC: 126 MG/DL
GFR SERPL CREATININE-BSD FRML MDRD: 87 ML/MIN/1.73SQ M
GLUCOSE SERPL-MCNC: 62 MG/DL (ref 65–140)
GLUCOSE UR STRIP-MCNC: NEGATIVE MG/DL
HBA1C MFR BLD: 6 % (ref 4.2–6.3)
HGB UR QL STRIP.AUTO: NEGATIVE
HYALINE CASTS #/AREA URNS LPF: NORMAL /LPF
KETONES UR STRIP-MCNC: NEGATIVE MG/DL
LEUKOCYTE ESTERASE UR QL STRIP: NEGATIVE
NITRITE UR QL STRIP: NEGATIVE
NON-SQ EPI CELLS URNS QL MICRO: NORMAL /HPF
PH UR STRIP.AUTO: 7.5 [PH] (ref 4.5–8)
POTASSIUM SERPL-SCNC: 3.9 MMOL/L (ref 3.5–5.3)
PROT UR STRIP-MCNC: NEGATIVE MG/DL
RBC #/AREA URNS AUTO: NORMAL /HPF
SODIUM SERPL-SCNC: 139 MMOL/L (ref 136–145)
SP GR UR STRIP.AUTO: 1.01 (ref 1–1.03)
UROBILINOGEN UR QL STRIP.AUTO: 0.2 E.U./DL
WBC #/AREA URNS AUTO: NORMAL /HPF

## 2018-10-25 PROCEDURE — 80048 BASIC METABOLIC PNL TOTAL CA: CPT

## 2018-10-25 PROCEDURE — 82306 VITAMIN D 25 HYDROXY: CPT

## 2018-10-25 PROCEDURE — 87086 URINE CULTURE/COLONY COUNT: CPT

## 2018-10-25 PROCEDURE — 83036 HEMOGLOBIN GLYCOSYLATED A1C: CPT

## 2018-10-25 PROCEDURE — 87077 CULTURE AEROBIC IDENTIFY: CPT

## 2018-10-25 PROCEDURE — 81001 URINALYSIS AUTO W/SCOPE: CPT

## 2018-10-25 PROCEDURE — 36415 COLL VENOUS BLD VENIPUNCTURE: CPT

## 2018-10-25 PROCEDURE — 87186 SC STD MICRODIL/AGAR DIL: CPT

## 2018-10-25 RX ORDER — NITROFURANTOIN 25; 75 MG/1; MG/1
100 CAPSULE ORAL 2 TIMES DAILY
Qty: 10 CAPSULE | Refills: 0 | Status: SHIPPED | OUTPATIENT
Start: 2018-10-25 | End: 2018-10-30

## 2018-10-25 RX ORDER — ESCITALOPRAM OXALATE 20 MG/1
20 TABLET ORAL DAILY
Qty: 90 TABLET | Refills: 2 | Status: CANCELLED | OUTPATIENT
Start: 2018-10-25

## 2018-10-25 NOTE — TELEPHONE ENCOUNTER
PT was last seen 4/2018 for her yearly,  She is now experiencing frequency of burning when urinating, back discomfort for 3 days, pls call pt  Thanks

## 2018-10-25 NOTE — TELEPHONE ENCOUNTER
Pt called office today c/o urinary symptoms  Pt states she has history of urinary tract infections  Pt reports she has been experiencing urinary symptoms for approximately 3 days now  Pt further reports having urinary frequency, lower pelvic area pressure, and burning during urination  Pt denies fever, cloudy/blood in urine, and vaginal symptoms  Pt denies any allergies to medication that she is aware of  Pt states she did use OTC AZO medication, however, said OTC medication did not relieve urinary symptoms  Pt instructed to complete UA & UC test before taking prescribed medication so as not to affect urine specimen  Lab orders for UA & UC completed in [facility] (Pt states she uses [facility] facilities)  Pt saw Chula Gill on 4/11/18, scheduled to see her again on 4/16/19  Rx for Macrobid 100 mg tablet PO BID for 5 days 0 refills was electronically forwarded to Pt's pharmacy in EHR pending provider signature per OCTAVIO Sultana's UTI protocol  Pt further instructed to completely finish all of prescribed medication, push fluids by mouth in order to flush out urinary system  "Patient Call" routed to [facility] for Rx signature

## 2018-10-26 ENCOUNTER — TELEPHONE (OUTPATIENT)
Dept: OBGYN CLINIC | Facility: CLINIC | Age: 73
End: 2018-10-26

## 2018-10-26 NOTE — TELEPHONE ENCOUNTER
Spoke with Pt today via phone call  Pt informed that recent UC test result showed an UTI infection per OCTAVIO Sultana's review  Pt further informed that she should completely fnish all of her prescribed antibiotic medication and to continue pushing fluids by mouth  Reiterated to Pt that if her symptoms worsen or do not improve to contact office

## 2018-10-26 NOTE — TELEPHONE ENCOUNTER
----- Message from Shanika Wolfe, [address] sent at 10/26/2018 12:14 PM EDT -----  Pls notify her urine cx showed an infection so she should complete her abx and call if not better after treatment   Thx

## 2018-10-27 LAB — BACTERIA UR CULT: ABNORMAL

## 2018-10-29 ENCOUNTER — TELEPHONE (OUTPATIENT)
Dept: OBGYN CLINIC | Facility: CLINIC | Age: 73
End: 2018-10-29

## 2018-10-29 RX ORDER — SULFAMETHOXAZOLE AND TRIMETHOPRIM 800; 160 MG/1; MG/1
1 TABLET ORAL EVERY 12 HOURS SCHEDULED
Qty: 10 TABLET | Refills: 0 | Status: SHIPPED | OUTPATIENT
Start: 2018-10-29 | End: 2018-11-03

## 2018-10-29 NOTE — TELEPHONE ENCOUNTER
Spoke with pt - she was already aware of urine results  Inquired about how she was feeling  Stated she was slightly better, but still has pain during urination  Explained to pt that the bacteria is resistant to Macrobid - we sent her an RX of Bactrim, which should help  Gave directions on use and cautions

## 2018-10-29 NOTE — TELEPHONE ENCOUNTER
----- Message from Kunal Trevino, [address] sent at 10/29/2018  7:24 AM EDT -----  Pls notify her urine cx showed an infection resistant to macrobid so I will send a different abx to her pharmacy  She may get a yeast infection from these antibiotics so she should use a monistat 7 treatment if this happens   x

## 2018-11-26 RX ORDER — INFLUENZA A VIRUSA/MICHIGAN/45/2015 X-275 (H1N1) ANTIGEN (FORMALDEHYDE INACTIVATED), INFLUENZA A VIRUS A/HONG KONG/4801/2014 X-263B (H3N2) ANTIGEN (FORMALDEHYDE INACTIVATED), AND INFLUENZA B VIRUS B/BRISBANE/60/2008 ANTIGEN (FORMALDEHYDE INACTIVATED) 60; 60; 60 UG/.5ML; UG/.5ML; UG/.5ML
INJECTION, SUSPENSION INTRAMUSCULAR
Refills: 0 | COMMUNITY
Start: 2018-11-11 | End: 2018-11-27 | Stop reason: CLARIF

## 2018-11-27 ENCOUNTER — OFFICE VISIT (OUTPATIENT)
Dept: INTERNAL MEDICINE CLINIC | Facility: CLINIC | Age: 73
End: 2018-11-27
Payer: MEDICARE

## 2018-11-27 VITALS
BODY MASS INDEX: 44.84 KG/M2 | WEIGHT: 222.4 LBS | SYSTOLIC BLOOD PRESSURE: 118 MMHG | DIASTOLIC BLOOD PRESSURE: 68 MMHG | HEIGHT: 59 IN | RESPIRATION RATE: 12 BRPM | HEART RATE: 74 BPM

## 2018-11-27 DIAGNOSIS — E78.5 BORDERLINE HYPERLIPIDEMIA: ICD-10-CM

## 2018-11-27 DIAGNOSIS — F32.A DEPRESSION, UNSPECIFIED DEPRESSION TYPE: ICD-10-CM

## 2018-11-27 DIAGNOSIS — G47.39 OTHER SLEEP APNEA: Primary | ICD-10-CM

## 2018-11-27 DIAGNOSIS — R73.9 HYPERGLYCEMIA: ICD-10-CM

## 2018-11-27 DIAGNOSIS — E66.01 MORBID OBESITY DUE TO EXCESS CALORIES (HCC): ICD-10-CM

## 2018-11-27 DIAGNOSIS — E55.9 VITAMIN D DEFICIENCY: ICD-10-CM

## 2018-11-27 PROCEDURE — 99214 OFFICE O/P EST MOD 30 MIN: CPT | Performed by: INTERNAL MEDICINE

## 2018-11-27 PROCEDURE — G0439 PPPS, SUBSEQ VISIT: HCPCS | Performed by: INTERNAL MEDICINE

## 2018-11-27 RX ORDER — ESCITALOPRAM OXALATE 20 MG/1
20 TABLET ORAL DAILY
Qty: 90 TABLET | Refills: 3 | Status: SHIPPED | OUTPATIENT
Start: 2018-11-27 | End: 2019-08-21 | Stop reason: SDUPTHER

## 2018-11-27 NOTE — PROGRESS NOTES
Assessment/Plan:  Regarding her obesity she has not gotten very far but is going to continue trying to lose weight  Regarding her pre diabetes her A1c is stable at 6 0  The importance of weight loss was stressed to the patient  She has obstructive sleep apnea and uses her machine  She is up-to-date on health maintenance issues including mammograms and colonoscopies  She will receive a flu shot today and I will see her back here in 4 months and repeat her A1c in rest of labs at that time      Recent Results (from the past 1008 hour(s))   Urinalysis with microscopic    Collection Time: 10/25/18 10:05 AM   Result Value Ref Range    Clarity, UA Clear     Color, UA Yellow     Specific [place], UA 1 011 1 003 - 1 030    pH, UA 7 5 4 5 - 8 0    Glucose, UA Negative Negative mg/dl    Ketones, UA Negative Negative mg/dl    Blood, UA Negative Negative    Protein, UA Negative Negative mg/dl    Nitrite, UA Negative Negative    Bilirubin, UA Negative Negative    Urobilinogen, UA 0 2 0 2, 1 0 E U /dl E U /dl    Leukocytes, UA Negative Negative    WBC, UA None Seen None Seen, 0-5, 5-55, 5-65 /hpf    RBC, UA None Seen None Seen, 0-5 /hpf    Hyaline Casts, UA None Seen None Seen /lpf    Bacteria, UA None Seen None Seen, Occasional /hpf    Epithelial Cells None Seen None Seen, Occasional /hpf   Urine culture    Collection Time: 10/25/18 10:05 AM   Result Value Ref Range    Urine Culture 10,000-19,000 cfu/ml Klebsiella pneumoniae (A)        Susceptibility    Klebsiella pneumoniae - EMELINA     ZID Performed Yes       Ampicillin ($$) >16 00 Resistant ug/ml     Ampicillin + Sulbactam ($) >16/8 Resistant ug/ml     Aztreonam ($$$)  <4 Susceptible ug/ml     Cefazolin ($) 8 00 Susceptible ug/ml     Ciprofloxacin ($)  <1 00 Susceptible ug/ml     Ertapenem ($$$) <0 5 Susceptible ug/ml     Gentamicin ($$) <1 Susceptible ug/ml     Levofloxacin ($) <0 25 Susceptible ug/ml     Nitrofurantoin >64 Resistant ug/ml     Tetracycline <4 Susceptible ug/ml     Tobramycin ($) <1 Susceptible ug/ml     Trimethoprim + Sulfamethoxazole ($$$) <2/38 Susceptible ug/ml   Vitamin D 25 hydroxy    Collection Time: 10/25/18 10:40 AM   Result Value Ref Range    Vit D, 25-Hydroxy 31 6 30 0 - 100 0 ng/mL   HEMOGLOBIN A1C W/ EAG ESTIMATION    Collection Time: 10/25/18 10:40 AM   Result Value Ref Range    Hemoglobin A1C 6 0 4 2 - 6 3 %     mg/dl   Basic metabolic panel    Collection Time: 10/25/18 10:40 AM   Result Value Ref Range    Sodium 139 136 - 145 mmol/L    Potassium 3 9 3 5 - 5 3 mmol/L    Chloride 104 100 - 108 mmol/L    CO2 29 21 - 32 mmol/L    ANION GAP 6 4 - 13 mmol/L    BUN 21 5 - 25 mg/dL    Creatinine 0 68 0 60 - 1 30 mg/dL    Glucose 62 (L) 65 - 140 mg/dL    Calcium 8 9 8 3 - 10 1 mg/dL    eGFR 87 ml/min/1 73sq m       1  Other sleep apnea     2  Depression, unspecified depression type  escitalopram (LEXAPRO) 20 mg tablet   3  Hyperglycemia  HEMOGLOBIN A1C W/ EAG ESTIMATION   4  Morbid obesity due to excess calories ([address] )     5  Borderline hyperlipidemia  CBC and differential    Comprehensive metabolic panel    Lipid panel   6  Vitamin D deficiency         Orders Placed This Encounter   Procedures    CBC and differential    Comprehensive metabolic panel    HEMOGLOBIN A1C W/ EAG ESTIMATION    Lipid panel         Subjective:  She generally feels well except for a lot of stress  She went through stressful time at [facility]  She has pre diabetes and markedly overweight  She has lost 2 lb  She does not really stick to much of a diet  No cardiopulmonary complaints  No history of hypertension  Does have obstructive sleep apnea  Has depression which is stable  Patient ID: Juanita Monday is a 68 y o  female  HPI    The following portions of the patient's history were reviewed and updated as appropriate:   She has a past medical history of Abnormal glucose; Benign neoplasm of skin; Bursitis; Diverticulitis;  Hypercholesterolemia; Lyme disease; and Sleep apnea (11/01/2018)  ,   does not have any pertinent problems on file  ,   has a past surgical history that includes Colonoscopy; Colectomy; and Hernia repair  ,  family history includes Cancer in her father; No Known Problems in her mother  ,   reports that she has never smoked  She has never used smokeless tobacco  She reports that she does not drink alcohol or use drugs  ,  has No Known Allergies       Current Outpatient Prescriptions:     aspirin 81 MG tablet, Take 81 mg by mouth daily, Disp: , Rfl:     cetirizine (ZyrTEC) 10 mg tablet, Take 10 mg by mouth daily, Disp: , Rfl:     cholecalciferol (VITAMIN D3) 1,000 units tablet, Take 1 tablet by mouth 2 (two) times a day, Disp: , Rfl:     escitalopram (LEXAPRO) 20 mg tablet, Take 1 tablet (20 mg total) by mouth daily, Disp: 90 tablet, Rfl: 3    hydrocortisone (PROCTOSOL HC) 2 5 % rectal cream, Insert into the rectum daily as needed, Disp: , Rfl:     Misc Natural Products (FIBER 7) POWD, Take by mouth, Disp: , Rfl:     Multiple Vitamins-Minerals (CENTRUM SILVER) tablet, Take by mouth, Disp: , Rfl:     VITAMIN B COMPLEX-C PO, Take by mouth, Disp: , Rfl:     ergocalciferol (VITAMIN D2) 50,000 units, Take 1 capsule by mouth once a week, Disp: , Rfl:     Review of Systems   Constitutional: Negative for activity change, appetite change, chills, diaphoresis, fatigue, fever and unexpected weight change  HENT: Negative for congestion, ear pain, hearing loss, mouth sores, nosebleeds, postnasal drip, sinus pain, sinus pressure, sore throat and trouble swallowing  Eyes: Negative for pain, discharge and visual disturbance  Respiratory: Negative for apnea, cough, chest tightness, shortness of breath and wheezing  Has obstructive sleep apnea  Cardiovascular: Negative for chest pain, palpitations and leg swelling  Gastrointestinal: Negative for abdominal pain, anal bleeding, blood in stool, constipation, diarrhea, nausea and vomiting  Endocrine: Negative for polydipsia and polyphagia  Genitourinary: Negative for decreased urine volume, dysuria, flank pain, frequency, hematuria and urgency  Musculoskeletal: Negative for arthralgias, back pain, gait problem, joint swelling and myalgias  Has carpal tunnel of her right hand which is going to be operated on  Skin: Negative for rash and wound  Allergic/Immunologic: Negative for environmental allergies and food allergies  Neurological: Negative for dizziness, tremors, seizures, syncope, speech difficulty, light-headedness, numbness and headaches  Hematological: Negative for adenopathy  Does not bruise/bleed easily  Psychiatric/Behavioral: Negative for agitation, confusion, hallucinations, sleep disturbance and suicidal ideas  The patient is not nervous/anxious  Depression is under control  Objective:  /68 (BP Location: Left arm, Patient Position: Sitting)   Pulse 74   Resp 12   Ht 4' 10 5" (1 486 m)   Wt 101 kg (222 lb 6 4 oz)   BMI 45 69 kg/m²      Physical Exam   Constitutional: She appears well-developed  No distress  Obese female in no acute distress  Blood pressure is 110/70  Rhythm is regular  Rate is 70  Respirations are 16  HENT:   Head: Normocephalic  Right Ear: External ear normal    Left Ear: External ear normal    Nose: Nose normal    Mouth/Throat: Oropharynx is clear and moist  No oropharyngeal exudate  Eyes: Pupils are equal, round, and reactive to light  Conjunctivae and EOM are normal  Right eye exhibits no discharge  Left eye exhibits no discharge  Neck: Normal range of motion  Neck supple  No thyromegaly present  Cardiovascular: Normal rate, regular rhythm, normal heart sounds and intact distal pulses  Exam reveals no gallop and no friction rub  No murmur heard  Pulmonary/Chest: Effort normal and breath sounds normal  No respiratory distress  She has no wheezes  She has no rales  Abdominal: Soft   Bowel sounds are normal  She exhibits no distension and no mass  There is no tenderness  There is no rebound and no guarding  Abdomen is obese soft nondistended with   Musculoskeletal: Normal range of motion  She exhibits no edema, tenderness or deformity  Lymphadenopathy:     She has no cervical adenopathy  Neurological: She is alert  She has normal reflexes  No cranial nerve deficit  Coordination normal    Skin: Skin is warm and dry  No rash noted  No erythema  Psychiatric: She has a normal mood and affect  Her behavior is normal  Judgment and thought content normal    Nursing note and vitals reviewed

## 2018-11-27 NOTE — PROGRESS NOTES
Assessment and Plan:  Patient in for wellness visit  Questions and answers a reviewed with patient    Problem List Items Addressed This Visit     None        Health Maintenance Due   Topic Date Due    Hepatitis C Screening  1945   Adriana Cummings Medicare Annual Wellness Visit (AWV)  1945    DXA SCAN  1945    INFLUENZA VACCINE  07/01/2018         HPI:  Lauro Benitez is a 68 y o  female here for her Subsequent Wellness Visit      Patient Active Problem List   Diagnosis    Depression    Hyperglycemia    Morbid obesity due to excess calories (HCC)    Borderline hyperlipidemia    Other sleep apnea    Hemorrhoids    Osteoporosis    Postmenopausal    Encounter for gynecological examination without abnormal finding    Vitamin D deficiency    CASEY (obstructive sleep apnea)     Past Medical History:   Diagnosis Date    Abnormal glucose     Benign neoplasm of skin     Diverticulitis     Hypercholesterolemia     Lyme disease      Past Surgical History:   Procedure Laterality Date    COLECTOMY      COLONOSCOPY      COMPLETE  2006, 2012    HERNIA REPAIR       Family History   Problem Relation Age of Onset    No Known Problems Mother     Cancer Father         Gallbladder    Breast cancer Neg Hx     Colon cancer Neg Hx     Ovarian cancer Neg Hx     Uterine cancer Neg Hx     Cervical cancer Neg Hx      History   Smoking Status    Never Smoker   Smokeless Tobacco    Never Used     History   Alcohol Use No      History   Drug Use No       Current Outpatient Prescriptions   Medication Sig Dispense Refill    aspirin 81 MG tablet Take 81 mg by mouth daily      cetirizine (ZyrTEC) 10 mg tablet Take 10 mg by mouth daily      cholecalciferol (VITAMIN D3) 1,000 units tablet Take 1 tablet by mouth 2 (two) times a day      ergocalciferol (VITAMIN D2) 50,000 units Take 1 capsule by mouth once a week      escitalopram (LEXAPRO) 20 mg tablet Take 1 tablet by mouth daily      FLUZONE HIGH-DOSE 0 5 ML JENIFER ADM 0 5ML IM UTD  0    hydrocortisone (PROCTOSOL HC) 2 5 % rectal cream Insert into the rectum daily as needed      Misc Natural Products (FIBER 7) POWD Take by mouth      Multiple Vitamins-Minerals (CENTRUM SILVER) tablet Take by mouth      VITAMIN B COMPLEX-C PO Take by mouth       No current facility-administered medications for this visit  No Known Allergies  Immunization History   Administered Date(s) Administered    Influenza 09/15/2009, 09/06/2012    Influenza Split High Dose Preservative Free IM 10/13/2017    Influenza TIV (IM) 09/21/2014, 11/27/2016    Pneumococcal Conjugate 13-Valent 03/02/2016    Pneumococcal Polysaccharide PPV23 05/21/2013    Tdap 1945, 06/28/2017    Zoster 05/21/2013       Patient Care Team:  Amanda Cunningham DO as PCP - General    Medicare Screening Tests and Risk Assessments:  Yi is here for her Subsequent Wellness visit  Last Medicare Wellness visit information reviewed, patient interviewed, no change since last AWV  Health Risk Assessment:  Patient rates overall health as good  Patient feels that their physical health rating is Same  Eyesight was rated as Same  Hearing was rated as Same  Patient feels that their emotional and mental health rating is Same  Pain experienced by patient in the last 7 days has been Some  Patient's pain rating has been 3/10  Emotional/Mental Health:  Patient has been feeling nervous/anxious  PHQ-9 Depression Screening:    Frequency of the following problems over the past two weeks:      1  Little interest or pleasure in doing things: 0 - not at all      2  Feeling down, depressed, or hopeless: 0 - not at all  PHQ-2 Score: 0          Broken Bones/Falls: Fall Risk Assessment:    In the past year, patient has experienced: No history of falling in past year          Bladder/Bowel:  Patient has leaked urine accidently in the last six months  Patient reports no loss of bowel control      Immunizations:  Patient has had a flu vaccination within the last year  Patient has not received a pneumonia shot  Patient has received a shingles shot  Patient has received tetanus/diphtheria shot  Date of tetanus/diphtheria shot: 10/13/2017    Home Safety:  Patient has trouble with stairs inside or outside of their home  Patient currently reports that there are no safety hazards present in home, working smoke alarms, working carbon monoxide detectors  Preventative Screenings:   Breast cancer screening performed, 5/15/2018  colon cancer screen completed, 1/18/2017  cholesterol screen completed, 10/25/2018        Nutrition:  Current diet: Regular and Limited junk food with servings of the following:    Medications:  Patient is currently taking over-the-counter supplements  List of OTC medications includes: vitamins and aspirin  Patient is able to manage medications  Lifestyle Choices:  Patient reports no tobacco use  Patient has not smoked or used tobacco in the past   Patient reports no alcohol use  Patient drives a vehicle  Patient wears seat belt  Current level of exercise of physical activity described by patient as: Moderate  Activities of Daily Living:  Can get out of bed by his or her self, able to dress self, able to make own meals, able to do own shopping, able to bathe self, can do own laundry/housekeeping, can manage own money, pay bills and track expenses    Previous Hospitalizations:  Hospitalization or ED visit in past 12 months        Advanced Directives:  Patient has decided on a power of   Patient has spoken to designated power of   Patient has completed advanced directive    Additional Comments: [facility] ()  [id number] [place] [id number]  [phone]    Preventative Screening/Counseling:      Cardiovascular:      General: Risks and Benefits Discussed and Screening Current          Diabetes:      General: Risks and Benefits Discussed and Screening Current Colorectal Cancer:      General: Risks and Benefits Discussed and Screening Current          Breast Cancer:      General: Risks and Benefits Discussed and Screening Current          Cervical Cancer:      General: Screening Not Indicated          Osteoporosis:      General: Risks and Benefits Discussed and Screening Current          AAA:      General: Screening Not Indicated          Glaucoma:      General: Risks and Benefits Discussed and Screening Current          HIV:      General: Screening Not Indicated          Hepatitis C:      General: Screening Not Indicated        Advanced Directives:   Patient has living will for healthcare, has durable POA for healthcare,

## 2019-01-23 ENCOUNTER — OFFICE VISIT (OUTPATIENT)
Dept: PULMONOLOGY | Facility: CLINIC | Age: 74
End: 2019-01-23
Payer: MEDICARE

## 2019-01-23 VITALS
WEIGHT: 220 LBS | BODY MASS INDEX: 44.35 KG/M2 | DIASTOLIC BLOOD PRESSURE: 72 MMHG | OXYGEN SATURATION: 97 % | HEIGHT: 59 IN | HEART RATE: 76 BPM | SYSTOLIC BLOOD PRESSURE: 122 MMHG

## 2019-01-23 DIAGNOSIS — G47.33 OSA (OBSTRUCTIVE SLEEP APNEA): Primary | ICD-10-CM

## 2019-01-23 DIAGNOSIS — E66.01 MORBID (SEVERE) OBESITY DUE TO EXCESS CALORIES (HCC): ICD-10-CM

## 2019-01-23 PROCEDURE — 99214 OFFICE O/P EST MOD 30 MIN: CPT | Performed by: INTERNAL MEDICINE

## 2019-01-23 NOTE — PROGRESS NOTES
Assessment/Plan:   Diagnoses and all orders for this visit:    CASEY (obstructive sleep apnea)    Morbid (severe) obesity due to excess calories [facility])        Patient with severe obstructive sleep apnea AHI of 31 point the recent home sleep study,  Currently on auto CPAP doing well  CPAP download compliance the reviewed with 93% compliance, acceptable residual AHI  Discussed cleaning of the supplies and change of CPAP supplies every 6 months  Recommend weight loss  Follow-up in 1 year or p r n  Earlier as needed  Return in about 1 year (around 1/23/2020)  All questions are answered to the patient's satisfaction and understanding  She verbalizes understanding  She is encouraged to call with any further questions or concerns  Portions of the record may have been created with voice recognition software  Occasional wrong word or "sound a like" substitutions may have occurred due to the inherent limitations of voice recognition software  Read the chart carefully and recognize, using context, where substitutions have occurred  Electronically Signed by Shaq Park MD    ______________________________________________________________________    Chief Complaint: No chief complaint on file  Patient ID: Cathy Gunderson is a 68 y o  y o  female has a past medical history of Abnormal glucose; Benign neoplasm of skin; Bursitis; Diverticulitis; Hypercholesterolemia; Lyme disease; and Sleep apnea (11/01/2018)  1/23/2019  Patient presents today for follow-up visit  Patient with severe obstructive sleep apnea AHI of 31 3 currently on auto CPAP here for follow-up        Review of Systems   Constitutional: Positive for fatigue  Negative for appetite change, chills, diaphoresis, fever and unexpected weight change  HENT: Negative for congestion, ear discharge, ear pain, nosebleeds, postnasal drip, rhinorrhea, sinus pain, sore throat and voice change  Eyes: Negative for pain, discharge and visual disturbance  Respiratory: Negative for apnea, cough, choking, chest tightness, shortness of breath, wheezing and stridor  Cardiovascular: Negative for chest pain, palpitations and leg swelling  Gastrointestinal: Negative for abdominal pain, blood in stool, constipation, diarrhea and vomiting  Endocrine: Negative for cold intolerance, heat intolerance, polydipsia, polyphagia and polyuria  Genitourinary: Negative for difficulty urinating and dysuria  Musculoskeletal: Negative for arthralgias and neck pain  Skin: Negative for pallor and rash  Allergic/Immunologic: Negative for environmental allergies and food allergies  Neurological: Negative for dizziness, speech difficulty, weakness and light-headedness  Hematological: Negative for adenopathy  Does not bruise/bleed easily  Psychiatric/Behavioral: Negative for agitation, confusion and sleep disturbance  The patient is not nervous/anxious  Smoking history: She reports that she has never smoked   She has never used smokeless tobacco     The following portions of the patient's history were reviewed and updated as appropriate: allergies, current medications, past family history, past medical history, past social history, past surgical history and problem list     Immunization History   Administered Date(s) Administered    Influenza 09/15/2009, 09/06/2012, 11/11/2018    Influenza Split High Dose Preservative Free IM 10/13/2017    Influenza TIV (IM) 09/21/2014, 11/27/2016    Pneumococcal Conjugate 13-Valent 03/02/2016    Pneumococcal Polysaccharide PPV23 05/21/2013    Tdap 1945, 06/28/2017    Zoster 05/21/2013     Current Outpatient Prescriptions   Medication Sig Dispense Refill    aspirin 81 MG tablet Take 81 mg by mouth daily      cetirizine (ZyrTEC) 10 mg tablet Take 10 mg by mouth daily      cholecalciferol (VITAMIN D3) 1,000 units tablet Take 1 tablet by mouth 2 (two) times a day      ergocalciferol (VITAMIN D2) 50,000 units Take 1 capsule by mouth once a week      escitalopram (LEXAPRO) 20 mg tablet Take 1 tablet (20 mg total) by mouth daily 90 tablet 3    hydrocortisone (PROCTOSOL HC) 2 5 % rectal cream Insert into the rectum daily as needed      Misc Natural Products (FIBER 7) POWD Take by mouth      Multiple Vitamins-Minerals (CENTRUM SILVER) tablet Take by mouth      VITAMIN B COMPLEX-C PO Take by mouth       No current facility-administered medications for this visit  Allergies: Patient has no known allergies  Objective:  Vitals:    01/23/19 0806   BP: 122/72   Pulse: 76   SpO2: 97%   Weight: 99 8 kg (220 lb)   Height: 4' 11" (1 499 m)   Oxygen Therapy  SpO2: 97 %    Wt Readings from Last 3 Encounters:   01/23/19 99 8 kg (220 lb)   11/27/18 101 kg (222 lb 6 4 oz)   10/01/18 102 kg (224 lb)     Body mass index is 44 43 kg/m²  Physical Exam   Constitutional: She is oriented to person, place, and time  She appears well-developed and well-nourished  HENT:   Head: Normocephalic and atraumatic  Crowded oropharyngeal airways, Mallampati score 4   Eyes: Pupils are equal, round, and reactive to light  EOM are normal    Neck: Normal range of motion  Neck supple  Short and wide neck   Cardiovascular: Normal rate, regular rhythm and normal heart sounds  Pulmonary/Chest: Effort normal and breath sounds normal    Abdominal: Soft  Bowel sounds are normal    Musculoskeletal: Normal range of motion  Neurological: She is alert and oriented to person, place, and time  Skin: Skin is warm and dry  Psychiatric: She has a normal mood and affect   Her behavior is normal        ESS: Total score: 4

## 2019-01-28 ENCOUNTER — OFFICE VISIT (OUTPATIENT)
Dept: INTERNAL MEDICINE CLINIC | Facility: CLINIC | Age: 74
End: 2019-01-28
Payer: MEDICARE

## 2019-01-28 ENCOUNTER — APPOINTMENT (OUTPATIENT)
Dept: RADIOLOGY | Facility: CLINIC | Age: 74
End: 2019-01-28
Payer: MEDICARE

## 2019-01-28 ENCOUNTER — TELEPHONE (OUTPATIENT)
Dept: INTERNAL MEDICINE CLINIC | Facility: CLINIC | Age: 74
End: 2019-01-28

## 2019-01-28 VITALS
HEART RATE: 72 BPM | BODY MASS INDEX: 46.13 KG/M2 | OXYGEN SATURATION: 96 % | WEIGHT: 228.4 LBS | TEMPERATURE: 97.8 F | DIASTOLIC BLOOD PRESSURE: 74 MMHG | SYSTOLIC BLOOD PRESSURE: 130 MMHG

## 2019-01-28 DIAGNOSIS — S29.9XXA TRAUMA OF CHEST, INITIAL ENCOUNTER: ICD-10-CM

## 2019-01-28 DIAGNOSIS — R07.81 RIB PAIN ON LEFT SIDE: Primary | ICD-10-CM

## 2019-01-28 DIAGNOSIS — R07.81 RIB PAIN ON LEFT SIDE: ICD-10-CM

## 2019-01-28 PROCEDURE — 71101 X-RAY EXAM UNILAT RIBS/CHEST: CPT

## 2019-01-28 PROCEDURE — 99214 OFFICE O/P EST MOD 30 MIN: CPT | Performed by: PHYSICIAN ASSISTANT

## 2019-01-28 NOTE — TELEPHONE ENCOUNTER
----- [facility] 1[facility]28/2019  4:07 PM EST -----  Please let the patient know that there are no rib fractures noted on her x-ray

## 2019-01-28 NOTE — PROGRESS NOTES
Assessment/Plan:    Left chest wall pain both anterior and posterior--will check a stat x-ray of the ribs as well as a PA and lateral lung film  She may continue to use ibuprofen between 408 100 mg t i d  with food as needed  Apply heat to the area 2 to 3 times a day  No problem-specific Assessment & Plan notes found for this encounter  Diagnoses and all orders for this visit:    Rib pain on left side  -     XR ribs left w pa chest min 3 views; Future    Trauma of chest, initial encounter  -     XR ribs left w pa chest min 3 views; Future          Subjective:      Patient ID: Yi Foy is a 68 y o  female  About 10 days ago the patient was walking into [employer] and tripped on the curb  She fell onto her left side  Her left hand and elbow became bruised and slightly swollen  As time passed the bruising and pain went away and so did the swelling  There is only a very faint bruise on the back of her left hand  She also had pain over her left lower ribs and abdomen but that pain seemed to subside  She began having pain in the left upper chest and left posterior ribs more recently  There is no bruising of this pot part of her body at all  She says it hurts more to lay back flat than any other position  No pleuritic pain  She did not seek medical attention when the injury occurred but today she would like to get an x-ray  The following portions of the patient's history were reviewed and updated as appropriate: allergies, current medications, past medical history, past social history and problem list     Review of Systems   Constitutional: Negative for chills, fatigue and fever  Respiratory: Negative for cough, chest tightness and shortness of breath  Cardiovascular: Negative for chest pain and palpitations  Gastrointestinal: Negative for abdominal pain, constipation and diarrhea  Musculoskeletal: Positive for back pain and myalgias           Objective:      /74 (BP Location: Left arm, Patient Position: Sitting, Cuff Size: Standard)   Pulse 72   Temp 97 8 °F (36 6 °C)   Wt 104 kg (228 lb 6 4 oz)   SpO2 96%   BMI 46 13 kg/m²          Physical Exam   Constitutional: She appears well-developed and well-nourished  HENT:   Head: Normocephalic and atraumatic  Cardiovascular: Normal rate, regular rhythm and normal heart sounds  Pulmonary/Chest: Effort normal and breath sounds normal  No respiratory distress  She has no wheezes  She exhibits tenderness  She exhibits no edema, no deformity and no swelling  No bruising noted on the skin anywhere on the torso on the left side   Abdominal: Soft  Bowel sounds are normal  There is no tenderness  Musculoskeletal:        Thoracic back: She exhibits tenderness  She exhibits no swelling, no edema and no deformity          Back:

## 2019-01-28 NOTE — TELEPHONE ENCOUNTER
----- [facility] at 1/28/2019  4:07 PM EST -----  Please let the patient know that there are no rib fractures noted on her x-ray

## 2019-03-29 ENCOUNTER — APPOINTMENT (OUTPATIENT)
Dept: LAB | Facility: CLINIC | Age: 74
End: 2019-03-29
Payer: MEDICARE

## 2019-03-29 DIAGNOSIS — R73.9 HYPERGLYCEMIA: ICD-10-CM

## 2019-03-29 DIAGNOSIS — E78.5 BORDERLINE HYPERLIPIDEMIA: ICD-10-CM

## 2019-03-29 LAB
ALBUMIN SERPL BCP-MCNC: 3.5 G/DL (ref 3.5–5)
ALP SERPL-CCNC: 70 U/L (ref 46–116)
ALT SERPL W P-5'-P-CCNC: 22 U/L (ref 12–78)
ANION GAP SERPL CALCULATED.3IONS-SCNC: 2 MMOL/L (ref 4–13)
AST SERPL W P-5'-P-CCNC: 27 U/L (ref 5–45)
BASOPHILS # BLD AUTO: 0.05 THOUSANDS/ΜL (ref 0–0.1)
BASOPHILS NFR BLD AUTO: 1 % (ref 0–1)
BILIRUB SERPL-MCNC: 0.48 MG/DL (ref 0.2–1)
BUN SERPL-MCNC: 20 MG/DL (ref 5–25)
CALCIUM SERPL-MCNC: 8.8 MG/DL (ref 8.3–10.1)
CHLORIDE SERPL-SCNC: 106 MMOL/L (ref 100–108)
CHOLEST SERPL-MCNC: 180 MG/DL (ref 50–200)
CO2 SERPL-SCNC: 31 MMOL/L (ref 21–32)
CREAT SERPL-MCNC: 0.76 MG/DL (ref 0.6–1.3)
EOSINOPHIL # BLD AUTO: 0.26 THOUSAND/ΜL (ref 0–0.61)
EOSINOPHIL NFR BLD AUTO: 5 % (ref 0–6)
ERYTHROCYTE [DISTWIDTH] IN BLOOD BY AUTOMATED COUNT: 13.3 % (ref 11.6–15.1)
EST. AVERAGE GLUCOSE BLD GHB EST-MCNC: 117 MG/DL
GFR SERPL CREATININE-BSD FRML MDRD: 78 ML/MIN/1.73SQ M
GLUCOSE P FAST SERPL-MCNC: 108 MG/DL (ref 65–99)
HBA1C MFR BLD: 5.7 % (ref 4.2–6.3)
HCT VFR BLD AUTO: 40.8 % (ref 34.8–46.1)
HDLC SERPL-MCNC: 52 MG/DL (ref 40–60)
HGB BLD-MCNC: 13.7 G/DL (ref 11.5–15.4)
IMM GRANULOCYTES # BLD AUTO: 0 THOUSAND/UL (ref 0–0.2)
IMM GRANULOCYTES NFR BLD AUTO: 0 % (ref 0–2)
LDLC SERPL CALC-MCNC: 106 MG/DL (ref 0–100)
LYMPHOCYTES # BLD AUTO: 1.48 THOUSANDS/ΜL (ref 0.6–4.47)
LYMPHOCYTES NFR BLD AUTO: 30 % (ref 14–44)
MCH RBC QN AUTO: 30.5 PG (ref 26.8–34.3)
MCHC RBC AUTO-ENTMCNC: 33.6 G/DL (ref 31.4–37.4)
MCV RBC AUTO: 91 FL (ref 82–98)
MONOCYTES # BLD AUTO: 0.39 THOUSAND/ΜL (ref 0.17–1.22)
MONOCYTES NFR BLD AUTO: 8 % (ref 4–12)
NEUTROPHILS # BLD AUTO: 2.82 THOUSANDS/ΜL (ref 1.85–7.62)
NEUTS SEG NFR BLD AUTO: 56 % (ref 43–75)
NONHDLC SERPL-MCNC: 128 MG/DL
NRBC BLD AUTO-RTO: 0 /100 WBCS
PLATELET # BLD AUTO: 133 THOUSANDS/UL (ref 149–390)
PMV BLD AUTO: 10.9 FL (ref 8.9–12.7)
POTASSIUM SERPL-SCNC: 3.8 MMOL/L (ref 3.5–5.3)
PROT SERPL-MCNC: 6.8 G/DL (ref 6.4–8.2)
RBC # BLD AUTO: 4.49 MILLION/UL (ref 3.81–5.12)
SODIUM SERPL-SCNC: 139 MMOL/L (ref 136–145)
TRIGL SERPL-MCNC: 110 MG/DL
WBC # BLD AUTO: 5 THOUSAND/UL (ref 4.31–10.16)

## 2019-03-29 PROCEDURE — 80061 LIPID PANEL: CPT

## 2019-03-29 PROCEDURE — 80053 COMPREHEN METABOLIC PANEL: CPT

## 2019-03-29 PROCEDURE — 83036 HEMOGLOBIN GLYCOSYLATED A1C: CPT

## 2019-03-29 PROCEDURE — 85025 COMPLETE CBC W/AUTO DIFF WBC: CPT

## 2019-03-29 PROCEDURE — 36415 COLL VENOUS BLD VENIPUNCTURE: CPT

## 2019-04-03 ENCOUNTER — OFFICE VISIT (OUTPATIENT)
Dept: INTERNAL MEDICINE CLINIC | Facility: CLINIC | Age: 74
End: 2019-04-03
Payer: MEDICARE

## 2019-04-03 VITALS
HEART RATE: 64 BPM | OXYGEN SATURATION: 92 % | DIASTOLIC BLOOD PRESSURE: 80 MMHG | SYSTOLIC BLOOD PRESSURE: 112 MMHG | BODY MASS INDEX: 44.23 KG/M2 | WEIGHT: 219.4 LBS | HEIGHT: 59 IN

## 2019-04-03 DIAGNOSIS — F32.A DEPRESSION, UNSPECIFIED DEPRESSION TYPE: ICD-10-CM

## 2019-04-03 DIAGNOSIS — E66.01 MORBID OBESITY DUE TO EXCESS CALORIES (HCC): ICD-10-CM

## 2019-04-03 DIAGNOSIS — E78.5 BORDERLINE HYPERLIPIDEMIA: ICD-10-CM

## 2019-04-03 DIAGNOSIS — R73.9 HYPERGLYCEMIA: ICD-10-CM

## 2019-04-03 DIAGNOSIS — G47.33 OSA (OBSTRUCTIVE SLEEP APNEA): Primary | ICD-10-CM

## 2019-04-03 PROCEDURE — 99214 OFFICE O/P EST MOD 30 MIN: CPT | Performed by: INTERNAL MEDICINE

## 2019-04-16 DIAGNOSIS — Z12.31 ENCOUNTER FOR SCREENING MAMMOGRAM FOR MALIGNANT NEOPLASM OF BREAST: Primary | ICD-10-CM

## 2019-05-21 DIAGNOSIS — Z12.31 ENCOUNTER FOR SCREENING MAMMOGRAM FOR MALIGNANT NEOPLASM OF BREAST: ICD-10-CM

## 2019-08-14 ENCOUNTER — APPOINTMENT (OUTPATIENT)
Dept: LAB | Facility: CLINIC | Age: 74
End: 2019-08-14
Payer: MEDICARE

## 2019-08-14 DIAGNOSIS — R73.9 HYPERGLYCEMIA: ICD-10-CM

## 2019-08-14 DIAGNOSIS — E78.5 BORDERLINE HYPERLIPIDEMIA: ICD-10-CM

## 2019-08-14 LAB
ANION GAP SERPL CALCULATED.3IONS-SCNC: 3 MMOL/L (ref 4–13)
BUN SERPL-MCNC: 21 MG/DL (ref 5–25)
CALCIUM SERPL-MCNC: 8.8 MG/DL (ref 8.3–10.1)
CHLORIDE SERPL-SCNC: 106 MMOL/L (ref 100–108)
CHOLEST SERPL-MCNC: 189 MG/DL (ref 50–200)
CO2 SERPL-SCNC: 30 MMOL/L (ref 21–32)
CREAT SERPL-MCNC: 0.76 MG/DL (ref 0.6–1.3)
EST. AVERAGE GLUCOSE BLD GHB EST-MCNC: 120 MG/DL
GFR SERPL CREATININE-BSD FRML MDRD: 78 ML/MIN/1.73SQ M
GLUCOSE P FAST SERPL-MCNC: 108 MG/DL (ref 65–99)
HBA1C MFR BLD: 5.8 % (ref 4.2–6.3)
HDLC SERPL-MCNC: 51 MG/DL (ref 40–60)
LDLC SERPL CALC-MCNC: 111 MG/DL (ref 0–100)
NONHDLC SERPL-MCNC: 138 MG/DL
POTASSIUM SERPL-SCNC: 3.8 MMOL/L (ref 3.5–5.3)
SODIUM SERPL-SCNC: 139 MMOL/L (ref 136–145)
TRIGL SERPL-MCNC: 134 MG/DL

## 2019-08-14 PROCEDURE — 36415 COLL VENOUS BLD VENIPUNCTURE: CPT

## 2019-08-14 PROCEDURE — 83036 HEMOGLOBIN GLYCOSYLATED A1C: CPT

## 2019-08-14 PROCEDURE — 80048 BASIC METABOLIC PNL TOTAL CA: CPT

## 2019-08-14 PROCEDURE — 80061 LIPID PANEL: CPT

## 2019-08-21 ENCOUNTER — OFFICE VISIT (OUTPATIENT)
Dept: INTERNAL MEDICINE CLINIC | Facility: CLINIC | Age: 74
End: 2019-08-21
Payer: MEDICARE

## 2019-08-21 VITALS
BODY MASS INDEX: 44.96 KG/M2 | SYSTOLIC BLOOD PRESSURE: 128 MMHG | OXYGEN SATURATION: 93 % | HEART RATE: 67 BPM | DIASTOLIC BLOOD PRESSURE: 86 MMHG | HEIGHT: 59 IN | WEIGHT: 223 LBS

## 2019-08-21 DIAGNOSIS — F32.A DEPRESSION, UNSPECIFIED DEPRESSION TYPE: ICD-10-CM

## 2019-08-21 DIAGNOSIS — E66.09 OBESITY DUE TO EXCESS CALORIES, UNSPECIFIED CLASSIFICATION, UNSPECIFIED WHETHER SERIOUS COMORBIDITY PRESENT: ICD-10-CM

## 2019-08-21 DIAGNOSIS — R73.9 HYPERGLYCEMIA: Primary | ICD-10-CM

## 2019-08-21 DIAGNOSIS — E78.5 BORDERLINE HYPERLIPIDEMIA: ICD-10-CM

## 2019-08-21 DIAGNOSIS — E66.01 MORBID OBESITY DUE TO EXCESS CALORIES (HCC): ICD-10-CM

## 2019-08-21 PROBLEM — M85.80 SAPHO SYNDROME (HCC): Status: RESOLVED | Noted: 2019-08-21 | Resolved: 2019-08-21

## 2019-08-21 PROBLEM — M86.9 SAPHO SYNDROME (HCC): Status: RESOLVED | Noted: 2019-08-21 | Resolved: 2019-08-21

## 2019-08-21 PROBLEM — M86.9 SAPHO SYNDROME (HCC): Status: ACTIVE | Noted: 2019-08-21

## 2019-08-21 PROBLEM — L40.3 SAPHO SYNDROME (HCC): Status: ACTIVE | Noted: 2019-08-21

## 2019-08-21 PROBLEM — M65.90 SAPHO SYNDROME (HCC): Status: ACTIVE | Noted: 2019-08-21

## 2019-08-21 PROBLEM — M65.9 SAPHO SYNDROME (HCC): Status: ACTIVE | Noted: 2019-08-21

## 2019-08-21 PROBLEM — M65.90 SAPHO SYNDROME (HCC): Status: RESOLVED | Noted: 2019-08-21 | Resolved: 2019-08-21

## 2019-08-21 PROBLEM — M65.9 SAPHO SYNDROME (HCC): Status: RESOLVED | Noted: 2019-08-21 | Resolved: 2019-08-21

## 2019-08-21 PROBLEM — L70.9 SAPHO SYNDROME (HCC): Status: RESOLVED | Noted: 2019-08-21 | Resolved: 2019-08-21

## 2019-08-21 PROBLEM — L40.3 SAPHO SYNDROME (HCC): Status: RESOLVED | Noted: 2019-08-21 | Resolved: 2019-08-21

## 2019-08-21 PROBLEM — L70.9 SAPHO SYNDROME (HCC): Status: ACTIVE | Noted: 2019-08-21

## 2019-08-21 PROBLEM — M85.80 SAPHO SYNDROME (HCC): Status: ACTIVE | Noted: 2019-08-21

## 2019-08-21 PROCEDURE — 99214 OFFICE O/P EST MOD 30 MIN: CPT | Performed by: INTERNAL MEDICINE

## 2019-08-21 RX ORDER — ESCITALOPRAM OXALATE 20 MG/1
20 TABLET ORAL DAILY
Qty: 90 TABLET | Refills: 3 | Status: SHIPPED | OUTPATIENT
Start: 2019-08-21 | End: 2019-11-28 | Stop reason: SDUPTHER

## 2019-08-21 NOTE — PROGRESS NOTES
Assessment/Plan:  Regarding her hyperglycemia her A1c was 5 8  Continue dietary measures  LDL cholesterol mildly elevated at 111  Remains obese  Has obstructive sleep apnea  Otherwise she is up-to-date on health maintenance issues  She does overeat  Advised in the strongest terms to stick to her diet  I will see her back in 4 months  The importance of glycemic control was stressed to the patient  1  Hyperglycemia  Basic metabolic panel    HEMOGLOBIN A1C W/ EAG ESTIMATION   2  Morbid obesity due to excess calories (HCC)     3  Borderline hyperlipidemia     4  Depression, unspecified depression type  escitalopram (LEXAPRO) 20 mg tablet   5  Obesity due to excess calories, unspecified classification, unspecified whether serious comorbidity present         Orders Placed This Encounter   Procedures    Basic metabolic panel    HEMOGLOBIN A1C W/ EAG ESTIMATION         Subjective:  Problems as listed  Remains obese  Does not really lose much in the way of weight  Has obstructive sleep apnea  Depression under control  She is prediabetic  Patient ID: Randal Reynoso is a 76 y o  female  HPI    The following portions of the patient's history were reviewed and updated as appropriate:   She has a past medical history of Abnormal glucose, Benign neoplasm of skin, Bursitis, Diverticulitis, Hypercholesterolemia, Lyme disease, and Sleep apnea (11/01/2018)  ,  does not have any pertinent problems on file  ,   has a past surgical history that includes Colonoscopy; Colectomy; and Hernia repair  ,  family history includes Cancer in her father; Hypertension in her father and son; Hypotension in her son; No Known Problems in her mother  ,   reports that she has never smoked  She has never used smokeless tobacco  She reports that she does not drink alcohol or use drugs  ,  has No Known Allergies       Current Outpatient Medications:     aspirin 81 MG tablet, Take 81 mg by mouth daily, Disp: , Rfl:    Cetirizine HCl (ZYRTEC ALLERGY) 10 MG CAPS, Zyrtec 10 mg capsule  Take by oral route , Disp: , Rfl:     cholecalciferol (VITAMIN D3) 1,000 units tablet, Take 1 tablet by mouth 2 (two) times a day, Disp: , Rfl:     escitalopram (LEXAPRO) 20 mg tablet, Take 1 tablet (20 mg total) by mouth daily, Disp: 90 tablet, Rfl: 3    hydrocortisone (PROCTOSOL HC) 2 5 % rectal cream, Insert into the rectum daily as needed, Disp: , Rfl:     Misc Natural Products (FIBER 7) POWD, Take by mouth, Disp: , Rfl:     Multiple Vitamins-Minerals (CENTRUM SILVER) tablet, Take by mouth, Disp: , Rfl:     VITAMIN B COMPLEX-C PO, Take by mouth, Disp: , Rfl:     Review of Systems   Constitutional: Negative for activity change, appetite change, chills, diaphoresis, fatigue, fever and unexpected weight change  HENT: Negative for congestion, ear pain, hearing loss, mouth sores, nosebleeds, postnasal drip, sinus pressure, sinus pain, sore throat and trouble swallowing  Eyes: Negative for pain, discharge and visual disturbance  Respiratory: Negative for apnea, cough, chest tightness, shortness of breath and wheezing  Cardiovascular: Negative for chest pain, palpitations and leg swelling  Gastrointestinal: Negative for abdominal pain, anal bleeding, blood in stool, constipation, diarrhea, nausea and vomiting  Endocrine: Negative for polydipsia and polyphagia  Genitourinary: Negative for decreased urine volume, dysuria, flank pain, frequency, hematuria and urgency  Musculoskeletal: Positive for arthralgias  Negative for back pain, gait problem, joint swelling and myalgias  Skin: Negative for rash and wound  Allergic/Immunologic: Negative for environmental allergies and food allergies  Neurological: Negative for dizziness, tremors, seizures, syncope, speech difficulty, light-headedness, numbness and headaches  Hematological: Negative for adenopathy  Does not bruise/bleed easily     Psychiatric/Behavioral: Negative for agitation, confusion, hallucinations, sleep disturbance and suicidal ideas  The patient is not nervous/anxious  Objective:  /86 (BP Location: Left arm, Patient Position: Sitting, Cuff Size: Large)   Pulse 67   Ht 4' 10 5" (1 486 m)   Wt 101 kg (223 lb)   SpO2 93%   BMI 45 81 kg/m²      Physical Exam   Constitutional: She appears well-developed  No distress  Markedly overweight  Blood pressure is 124/80  Heart rate is regular  Rate is 70  Respirations are 20  HENT:   Head: Normocephalic  Right Ear: External ear normal    Left Ear: External ear normal    Nose: Nose normal    Mouth/Throat: Oropharynx is clear and moist  No oropharyngeal exudate  Eyes: Pupils are equal, round, and reactive to light  Conjunctivae and EOM are normal  Right eye exhibits no discharge  Left eye exhibits no discharge  Neck: Normal range of motion  Neck supple  No thyromegaly present  Cardiovascular: Normal rate, regular rhythm, normal heart sounds and intact distal pulses  Exam reveals no gallop and no friction rub  No murmur heard  Pulmonary/Chest: Effort normal and breath sounds normal  No respiratory distress  She has no wheezes  She has no rales  Abdominal: Soft  Bowel sounds are normal  She exhibits no distension and no mass  There is no tenderness  There is no rebound and no guarding  Markedly overweight  No masses  Musculoskeletal: Normal range of motion  She exhibits no edema, tenderness or deformity  Lymphadenopathy:     She has no cervical adenopathy  Neurological: She is alert  She has normal reflexes  She displays normal reflexes  No cranial nerve deficit  Coordination normal    Skin: Skin is warm and dry  No rash noted  No erythema  Psychiatric: She has a normal mood and affect  Her behavior is normal  Judgment and thought content normal    Nursing note and vitals reviewed          Recent Results (from the past 1008 hour(s))   Basic metabolic panel    Collection Time: 08/14/19 8:50 AM   Result Value Ref Range    Sodium 139 136 - 145 mmol/L    Potassium 3 8 3 5 - 5 3 mmol/L    Chloride 106 100 - 108 mmol/L    CO2 30 21 - 32 mmol/L    ANION GAP 3 (L) 4 - 13 mmol/L    BUN 21 5 - 25 mg/dL    Creatinine 0 76 0 60 - 1 30 mg/dL    Glucose, Fasting 108 (H) 65 - 99 mg/dL    Calcium 8 8 8 3 - 10 1 mg/dL    eGFR 78 ml/min/1 73sq m   HEMOGLOBIN A1C W/ EAG ESTIMATION    Collection Time: 08/14/19  8:50 AM   Result Value Ref Range    Hemoglobin A1C 5 8 4 2 - 6 3 %     mg/dl   Lipid panel    Collection Time: 08/14/19  8:50 AM   Result Value Ref Range    Cholesterol 189 50 - 200 mg/dL    Triglycerides 134 <=150 mg/dL    HDL, Direct 51 40 - 60 mg/dL    LDL Calculated 111 (H) 0 - 100 mg/dL    Non-HDL-Chol (CHOL-HDL) 138 mg/dl

## 2019-11-01 ENCOUNTER — APPOINTMENT (OUTPATIENT)
Dept: LAB | Facility: CLINIC | Age: 74
End: 2019-11-01
Payer: MEDICARE

## 2019-11-01 ENCOUNTER — TELEPHONE (OUTPATIENT)
Dept: OBGYN CLINIC | Facility: CLINIC | Age: 74
End: 2019-11-01

## 2019-11-01 DIAGNOSIS — N39.0 URINARY TRACT INFECTION WITHOUT HEMATURIA, SITE UNSPECIFIED: ICD-10-CM

## 2019-11-01 DIAGNOSIS — N39.0 URINARY TRACT INFECTION WITHOUT HEMATURIA, SITE UNSPECIFIED: Primary | ICD-10-CM

## 2019-11-01 LAB
BACTERIA UR QL AUTO: ABNORMAL /HPF
BILIRUB UR QL STRIP: NEGATIVE
CLARITY UR: CLEAR
COLOR UR: ABNORMAL
GLUCOSE UR STRIP-MCNC: NEGATIVE MG/DL
HGB UR QL STRIP.AUTO: NEGATIVE
HYALINE CASTS #/AREA URNS LPF: ABNORMAL /LPF
KETONES UR STRIP-MCNC: NEGATIVE MG/DL
LEUKOCYTE ESTERASE UR QL STRIP: NEGATIVE
NITRITE UR QL STRIP: POSITIVE
NON-SQ EPI CELLS URNS QL MICRO: ABNORMAL /HPF
PH UR STRIP.AUTO: 6.5 [PH]
PROT UR STRIP-MCNC: NEGATIVE MG/DL
RBC #/AREA URNS AUTO: ABNORMAL /HPF
SP GR UR STRIP.AUTO: 1.01 (ref 1–1.03)
UROBILINOGEN UR QL STRIP.AUTO: 0.2 E.U./DL
WBC #/AREA URNS AUTO: ABNORMAL /HPF

## 2019-11-01 PROCEDURE — 81001 URINALYSIS AUTO W/SCOPE: CPT

## 2019-11-01 PROCEDURE — 87086 URINE CULTURE/COLONY COUNT: CPT

## 2019-11-01 RX ORDER — NITROFURANTOIN 25; 75 MG/1; MG/1
100 CAPSULE ORAL 2 TIMES DAILY
Qty: 10 CAPSULE | Refills: 0 | Status: SHIPPED | OUTPATIENT
Start: 2019-11-01 | End: 2019-11-06

## 2019-11-01 NOTE — TELEPHONE ENCOUNTER
Pt called office today c/o urinary symptoms  Pt states she has history of urinary tract infections  Pt reports she has been experiencing urinary symptoms for approximately 3 days now since wed  Pt further reports having urinary frequency, lower pelvic area pressure, and burning during urination with lower back pain  Pt denies fever, cloudy/blood in urine, and vaginal symptoms  Pt denies any allergies to medication that she is aware of  Pt states she did use OTC AZO medication, however, said OTC medication did not relieve urinary symptoms just turned her urine red/orange but it states it on the bottle  per protocol Pt instructed to complete UA & UC test before taking prescribed medication so as not to affect urine specimen  Lab orders for UA & UC completed in [facility] (Pt states she uses [facility] facilities)  Pt saw Tha Carrier on 4/11/18, scheduled to see her again on 04/2020  Rx for Macrobid 100 mg tablet PO BID for 5 days 0 refills was electronically forwarded to Pt's pharmacy in EHR pending provider signature per OCTAVIO Sultana's UTI protocol  Pt further instructed to completely finish all of prescribed medication, push fluids by mouth in order to flush out urinary system  "Patient Call" routed to [facility] for Rx signature  pt aware would get a call if not approved to schedule and ov  Pt agreed

## 2019-11-01 NOTE — TELEPHONE ENCOUNTER
Patient states she has a possible UTI  Patient is requesting medication be sent to her pharmacy  Please advise

## 2019-11-02 LAB — BACTERIA UR CULT: NORMAL

## 2019-11-04 ENCOUNTER — TELEPHONE (OUTPATIENT)
Dept: OBGYN CLINIC | Facility: CLINIC | Age: 74
End: 2019-11-04

## 2019-11-04 NOTE — TELEPHONE ENCOUNTER
----- Message from Gary Carroll, [address] T[address] Grier sent at 11/4/2019  7:29 AM EST -----  Please notify patient her urine culture was normal  Thanks

## 2019-11-28 DIAGNOSIS — F32.A DEPRESSION, UNSPECIFIED DEPRESSION TYPE: ICD-10-CM

## 2019-11-29 RX ORDER — ESCITALOPRAM OXALATE 20 MG/1
TABLET ORAL
Qty: 15 TABLET | Refills: 0 | Status: SHIPPED | OUTPATIENT
Start: 2019-11-29 | End: 2020-01-08 | Stop reason: SDUPTHER

## 2020-01-02 ENCOUNTER — APPOINTMENT (OUTPATIENT)
Dept: LAB | Facility: CLINIC | Age: 75
End: 2020-01-02
Payer: MEDICARE

## 2020-01-02 DIAGNOSIS — R73.9 HYPERGLYCEMIA: ICD-10-CM

## 2020-01-02 LAB
ANION GAP SERPL CALCULATED.3IONS-SCNC: 4 MMOL/L (ref 4–13)
BUN SERPL-MCNC: 20 MG/DL (ref 5–25)
CALCIUM SERPL-MCNC: 9.3 MG/DL (ref 8.3–10.1)
CHLORIDE SERPL-SCNC: 107 MMOL/L (ref 100–108)
CO2 SERPL-SCNC: 30 MMOL/L (ref 21–32)
CREAT SERPL-MCNC: 0.75 MG/DL (ref 0.6–1.3)
EST. AVERAGE GLUCOSE BLD GHB EST-MCNC: 126 MG/DL
GFR SERPL CREATININE-BSD FRML MDRD: 79 ML/MIN/1.73SQ M
GLUCOSE P FAST SERPL-MCNC: 110 MG/DL (ref 65–99)
HBA1C MFR BLD: 6 % (ref 4.2–6.3)
POTASSIUM SERPL-SCNC: 3.8 MMOL/L (ref 3.5–5.3)
SODIUM SERPL-SCNC: 141 MMOL/L (ref 136–145)

## 2020-01-02 PROCEDURE — 80048 BASIC METABOLIC PNL TOTAL CA: CPT

## 2020-01-02 PROCEDURE — 83036 HEMOGLOBIN GLYCOSYLATED A1C: CPT

## 2020-01-02 PROCEDURE — 36415 COLL VENOUS BLD VENIPUNCTURE: CPT

## 2020-01-08 ENCOUNTER — OFFICE VISIT (OUTPATIENT)
Dept: INTERNAL MEDICINE CLINIC | Facility: CLINIC | Age: 75
End: 2020-01-08
Payer: MEDICARE

## 2020-01-08 VITALS
HEIGHT: 59 IN | SYSTOLIC BLOOD PRESSURE: 158 MMHG | HEART RATE: 73 BPM | WEIGHT: 226.2 LBS | BODY MASS INDEX: 45.6 KG/M2 | OXYGEN SATURATION: 93 % | DIASTOLIC BLOOD PRESSURE: 88 MMHG

## 2020-01-08 DIAGNOSIS — E66.01 MORBID OBESITY DUE TO EXCESS CALORIES (HCC): ICD-10-CM

## 2020-01-08 DIAGNOSIS — K62.89 RECTAL PAIN: ICD-10-CM

## 2020-01-08 DIAGNOSIS — F32.A DEPRESSION, UNSPECIFIED DEPRESSION TYPE: ICD-10-CM

## 2020-01-08 DIAGNOSIS — F41.8 ANXIETY WITH DEPRESSION: Primary | ICD-10-CM

## 2020-01-08 PROCEDURE — 99214 OFFICE O/P EST MOD 30 MIN: CPT | Performed by: INTERNAL MEDICINE

## 2020-01-08 RX ORDER — ESCITALOPRAM OXALATE 20 MG/1
20 TABLET ORAL DAILY
Qty: 30 TABLET | Refills: 3 | Status: SHIPPED | OUTPATIENT
Start: 2020-01-08 | End: 2020-04-02 | Stop reason: SDUPTHER

## 2020-01-10 ENCOUNTER — TELEPHONE (OUTPATIENT)
Dept: BEHAVIORAL/MENTAL HEALTH CLINIC | Facility: CLINIC | Age: 75
End: 2020-01-10

## 2020-04-02 ENCOUNTER — TELEPHONE (OUTPATIENT)
Dept: OBGYN CLINIC | Facility: CLINIC | Age: 75
End: 2020-04-02

## 2020-04-02 DIAGNOSIS — Z12.31 VISIT FOR SCREENING MAMMOGRAM: Primary | ICD-10-CM

## 2020-04-02 DIAGNOSIS — F32.A DEPRESSION, UNSPECIFIED DEPRESSION TYPE: ICD-10-CM

## 2020-04-02 RX ORDER — ESCITALOPRAM OXALATE 20 MG/1
20 TABLET ORAL DAILY
Qty: 30 TABLET | Refills: 3 | Status: SHIPPED | OUTPATIENT
Start: 2020-04-02 | End: 2020-06-24 | Stop reason: SDUPTHER

## 2020-05-04 ENCOUNTER — TELEPHONE (OUTPATIENT)
Dept: INTERNAL MEDICINE CLINIC | Facility: CLINIC | Age: 75
End: 2020-05-04

## 2020-05-26 ENCOUNTER — TELEPHONE (OUTPATIENT)
Dept: INTERNAL MEDICINE CLINIC | Facility: CLINIC | Age: 75
End: 2020-05-26

## 2020-05-26 DIAGNOSIS — R73.9 HYPERGLYCEMIA: Primary | ICD-10-CM

## 2020-05-26 DIAGNOSIS — E78.5 BORDERLINE HYPERLIPIDEMIA: ICD-10-CM

## 2020-05-26 DIAGNOSIS — E66.01 MORBID OBESITY DUE TO EXCESS CALORIES (HCC): ICD-10-CM

## 2020-05-28 ENCOUNTER — TELEPHONE (OUTPATIENT)
Dept: INTERNAL MEDICINE CLINIC | Facility: CLINIC | Age: 75
End: 2020-05-28

## 2020-05-28 DIAGNOSIS — R92.8 ABNORMAL MAMMOGRAM: Primary | ICD-10-CM

## 2020-05-29 ENCOUNTER — TELEPHONE (OUTPATIENT)
Dept: INTERNAL MEDICINE CLINIC | Facility: CLINIC | Age: 75
End: 2020-05-29

## 2020-06-05 ENCOUNTER — TELEPHONE (OUTPATIENT)
Dept: INTERNAL MEDICINE CLINIC | Facility: CLINIC | Age: 75
End: 2020-06-05

## 2020-06-05 DIAGNOSIS — R92.8 ABNORMAL FINDING ON MAMMOGRAPHY: Primary | ICD-10-CM

## 2020-06-16 ENCOUNTER — APPOINTMENT (OUTPATIENT)
Dept: LAB | Facility: CLINIC | Age: 75
End: 2020-06-16
Payer: MEDICARE

## 2020-06-16 ENCOUNTER — ANNUAL EXAM (OUTPATIENT)
Dept: OBGYN CLINIC | Age: 75
End: 2020-06-16
Payer: MEDICARE

## 2020-06-16 VITALS
HEIGHT: 59 IN | SYSTOLIC BLOOD PRESSURE: 138 MMHG | DIASTOLIC BLOOD PRESSURE: 72 MMHG | BODY MASS INDEX: 46.57 KG/M2 | WEIGHT: 231 LBS

## 2020-06-16 DIAGNOSIS — R73.9 HYPERGLYCEMIA: ICD-10-CM

## 2020-06-16 DIAGNOSIS — Z13.820 SCREENING FOR OSTEOPOROSIS: ICD-10-CM

## 2020-06-16 DIAGNOSIS — Z78.0 POSTMENOPAUSAL: ICD-10-CM

## 2020-06-16 DIAGNOSIS — R32 URINARY INCONTINENCE, UNSPECIFIED TYPE: ICD-10-CM

## 2020-06-16 DIAGNOSIS — E78.5 BORDERLINE HYPERLIPIDEMIA: ICD-10-CM

## 2020-06-16 DIAGNOSIS — Z01.419 ENCOUNTER FOR GYNECOLOGICAL EXAMINATION WITHOUT ABNORMAL FINDING: Primary | ICD-10-CM

## 2020-06-16 LAB
ALBUMIN SERPL BCP-MCNC: 3.5 G/DL (ref 3.5–5)
ALP SERPL-CCNC: 66 U/L (ref 46–116)
ALT SERPL W P-5'-P-CCNC: 20 U/L (ref 12–78)
ANION GAP SERPL CALCULATED.3IONS-SCNC: 2 MMOL/L (ref 4–13)
AST SERPL W P-5'-P-CCNC: 22 U/L (ref 5–45)
BASOPHILS # BLD AUTO: 0.06 THOUSANDS/ΜL (ref 0–0.1)
BASOPHILS NFR BLD AUTO: 1 % (ref 0–1)
BILIRUB SERPL-MCNC: 0.45 MG/DL (ref 0.2–1)
BUN SERPL-MCNC: 24 MG/DL (ref 5–25)
CALCIUM SERPL-MCNC: 9.4 MG/DL (ref 8.3–10.1)
CHLORIDE SERPL-SCNC: 107 MMOL/L (ref 100–108)
CHOLEST SERPL-MCNC: 181 MG/DL (ref 50–200)
CO2 SERPL-SCNC: 30 MMOL/L (ref 21–32)
CREAT SERPL-MCNC: 0.74 MG/DL (ref 0.6–1.3)
EOSINOPHIL # BLD AUTO: 0.6 THOUSAND/ΜL (ref 0–0.61)
EOSINOPHIL NFR BLD AUTO: 9 % (ref 0–6)
ERYTHROCYTE [DISTWIDTH] IN BLOOD BY AUTOMATED COUNT: 13.2 % (ref 11.6–15.1)
EST. AVERAGE GLUCOSE BLD GHB EST-MCNC: 123 MG/DL
GFR SERPL CREATININE-BSD FRML MDRD: 80 ML/MIN/1.73SQ M
GLUCOSE P FAST SERPL-MCNC: 102 MG/DL (ref 65–99)
HBA1C MFR BLD: 5.9 %
HCT VFR BLD AUTO: 40.4 % (ref 34.8–46.1)
HDLC SERPL-MCNC: 56 MG/DL
HGB BLD-MCNC: 13.5 G/DL (ref 11.5–15.4)
IMM GRANULOCYTES # BLD AUTO: 0.01 THOUSAND/UL (ref 0–0.2)
IMM GRANULOCYTES NFR BLD AUTO: 0 % (ref 0–2)
LDLC SERPL CALC-MCNC: 104 MG/DL (ref 0–100)
LYMPHOCYTES # BLD AUTO: 1.79 THOUSANDS/ΜL (ref 0.6–4.47)
LYMPHOCYTES NFR BLD AUTO: 26 % (ref 14–44)
MCH RBC QN AUTO: 30.4 PG (ref 26.8–34.3)
MCHC RBC AUTO-ENTMCNC: 33.4 G/DL (ref 31.4–37.4)
MCV RBC AUTO: 91 FL (ref 82–98)
MONOCYTES # BLD AUTO: 0.52 THOUSAND/ΜL (ref 0.17–1.22)
MONOCYTES NFR BLD AUTO: 8 % (ref 4–12)
NEUTROPHILS # BLD AUTO: 3.89 THOUSANDS/ΜL (ref 1.85–7.62)
NEUTS SEG NFR BLD AUTO: 56 % (ref 43–75)
NONHDLC SERPL-MCNC: 125 MG/DL
NRBC BLD AUTO-RTO: 0 /100 WBCS
PLATELET # BLD AUTO: 140 THOUSANDS/UL (ref 149–390)
PMV BLD AUTO: 11.4 FL (ref 8.9–12.7)
POTASSIUM SERPL-SCNC: 4 MMOL/L (ref 3.5–5.3)
PROT SERPL-MCNC: 6.9 G/DL (ref 6.4–8.2)
RBC # BLD AUTO: 4.44 MILLION/UL (ref 3.81–5.12)
SODIUM SERPL-SCNC: 139 MMOL/L (ref 136–145)
TRIGL SERPL-MCNC: 107 MG/DL
WBC # BLD AUTO: 6.87 THOUSAND/UL (ref 4.31–10.16)

## 2020-06-16 PROCEDURE — 80061 LIPID PANEL: CPT

## 2020-06-16 PROCEDURE — 80053 COMPREHEN METABOLIC PANEL: CPT

## 2020-06-16 PROCEDURE — 85025 COMPLETE CBC W/AUTO DIFF WBC: CPT

## 2020-06-16 PROCEDURE — 83036 HEMOGLOBIN GLYCOSYLATED A1C: CPT

## 2020-06-16 PROCEDURE — 36415 COLL VENOUS BLD VENIPUNCTURE: CPT

## 2020-06-16 PROCEDURE — G0145 SCR C/V CYTO,THINLAYER,RESCR: HCPCS | Performed by: NURSE PRACTITIONER

## 2020-06-16 PROCEDURE — G0101 CA SCREEN;PELVIC/BREAST EXAM: HCPCS | Performed by: NURSE PRACTITIONER

## 2020-06-18 DIAGNOSIS — N89.8 VAGINA ITCHING: Primary | ICD-10-CM

## 2020-06-23 LAB
LAB AP GYN PRIMARY INTERPRETATION: NORMAL
Lab: NORMAL

## 2020-06-24 ENCOUNTER — TELEPHONE (OUTPATIENT)
Dept: INTERNAL MEDICINE CLINIC | Facility: CLINIC | Age: 75
End: 2020-06-24

## 2020-06-25 ENCOUNTER — OFFICE VISIT (OUTPATIENT)
Dept: INTERNAL MEDICINE CLINIC | Facility: CLINIC | Age: 75
End: 2020-06-25
Payer: MEDICARE

## 2020-06-25 VITALS
TEMPERATURE: 97.1 F | WEIGHT: 226.2 LBS | BODY MASS INDEX: 45.6 KG/M2 | HEIGHT: 59 IN | DIASTOLIC BLOOD PRESSURE: 82 MMHG | HEART RATE: 92 BPM | SYSTOLIC BLOOD PRESSURE: 144 MMHG | OXYGEN SATURATION: 93 %

## 2020-06-25 DIAGNOSIS — Z85.3 HISTORY OF BREAST CANCER: Primary | ICD-10-CM

## 2020-06-25 DIAGNOSIS — E66.01 MORBID OBESITY DUE TO EXCESS CALORIES (HCC): ICD-10-CM

## 2020-06-25 DIAGNOSIS — F32.A DEPRESSION, UNSPECIFIED DEPRESSION TYPE: ICD-10-CM

## 2020-06-25 DIAGNOSIS — K58.8 OTHER IRRITABLE BOWEL SYNDROME: ICD-10-CM

## 2020-06-25 DIAGNOSIS — R73.9 HYPERGLYCEMIA: ICD-10-CM

## 2020-06-25 PROCEDURE — 1036F TOBACCO NON-USER: CPT | Performed by: INTERNAL MEDICINE

## 2020-06-25 PROCEDURE — 1170F FXNL STATUS ASSESSED: CPT | Performed by: INTERNAL MEDICINE

## 2020-06-25 PROCEDURE — G0439 PPPS, SUBSEQ VISIT: HCPCS | Performed by: INTERNAL MEDICINE

## 2020-06-25 PROCEDURE — 99214 OFFICE O/P EST MOD 30 MIN: CPT | Performed by: INTERNAL MEDICINE

## 2020-06-25 PROCEDURE — 1125F AMNT PAIN NOTED PAIN PRSNT: CPT | Performed by: INTERNAL MEDICINE

## 2020-06-25 PROCEDURE — 1160F RVW MEDS BY RX/DR IN RCRD: CPT | Performed by: INTERNAL MEDICINE

## 2020-06-25 PROCEDURE — 3008F BODY MASS INDEX DOCD: CPT | Performed by: INTERNAL MEDICINE

## 2020-06-25 PROCEDURE — 4040F PNEUMOC VAC/ADMIN/RCVD: CPT | Performed by: INTERNAL MEDICINE

## 2020-06-25 RX ORDER — ESCITALOPRAM OXALATE 20 MG/1
20 TABLET ORAL DAILY
Qty: 30 TABLET | Refills: 3 | Status: SHIPPED | OUTPATIENT
Start: 2020-06-25 | End: 2020-10-02

## 2020-06-25 RX ORDER — DICYCLOMINE HYDROCHLORIDE 10 MG/1
10 CAPSULE ORAL
Qty: 30 CAPSULE | Refills: 3 | Status: SHIPPED | OUTPATIENT
Start: 2020-06-25 | End: 2021-02-23 | Stop reason: CLARIF

## 2020-09-08 ENCOUNTER — APPOINTMENT (OUTPATIENT)
Dept: LAB | Facility: CLINIC | Age: 75
End: 2020-09-08
Payer: MEDICARE

## 2020-09-08 ENCOUNTER — TELEPHONE (OUTPATIENT)
Dept: OBGYN CLINIC | Age: 75
End: 2020-09-08

## 2020-09-08 ENCOUNTER — TELEPHONE (OUTPATIENT)
Dept: INTERNAL MEDICINE CLINIC | Facility: CLINIC | Age: 75
End: 2020-09-08

## 2020-09-08 DIAGNOSIS — N30.00 ACUTE CYSTITIS WITHOUT HEMATURIA: Primary | ICD-10-CM

## 2020-09-08 DIAGNOSIS — N30.00 ACUTE CYSTITIS WITHOUT HEMATURIA: ICD-10-CM

## 2020-09-08 DIAGNOSIS — E78.5 BORDERLINE HYPERLIPIDEMIA: ICD-10-CM

## 2020-09-08 DIAGNOSIS — R73.9 HYPERGLYCEMIA: Primary | ICD-10-CM

## 2020-09-08 DIAGNOSIS — E66.01 MORBID OBESITY DUE TO EXCESS CALORIES (HCC): ICD-10-CM

## 2020-09-08 LAB
BACTERIA UR QL AUTO: NORMAL /HPF
BILIRUB UR QL STRIP: NEGATIVE
CLARITY UR: CLEAR
COLOR UR: NORMAL
GLUCOSE UR STRIP-MCNC: NEGATIVE MG/DL
HGB UR QL STRIP.AUTO: NEGATIVE
HYALINE CASTS #/AREA URNS LPF: NORMAL /LPF
KETONES UR STRIP-MCNC: NEGATIVE MG/DL
LEUKOCYTE ESTERASE UR QL STRIP: NEGATIVE
NITRITE UR QL STRIP: NEGATIVE
NON-SQ EPI CELLS URNS QL MICRO: NORMAL /HPF
PH UR STRIP.AUTO: 6 [PH]
PROT UR STRIP-MCNC: NEGATIVE MG/DL
RBC #/AREA URNS AUTO: NORMAL /HPF
SP GR UR STRIP.AUTO: 1.02 (ref 1–1.03)
UROBILINOGEN UR QL STRIP.AUTO: 0.2 E.U./DL
WBC #/AREA URNS AUTO: NORMAL /HPF

## 2020-09-08 PROCEDURE — 81001 URINALYSIS AUTO W/SCOPE: CPT | Performed by: NURSE PRACTITIONER

## 2020-09-08 PROCEDURE — 87086 URINE CULTURE/COLONY COUNT: CPT

## 2020-09-08 RX ORDER — NITROFURANTOIN 25; 75 MG/1; MG/1
CAPSULE ORAL
Qty: 6 CAPSULE | Refills: 0 | Status: SHIPPED | OUTPATIENT
Start: 2020-09-08 | End: 2021-02-23 | Stop reason: CLARIF

## 2020-09-08 NOTE — TELEPHONE ENCOUNTER
Pt has had uti sx for 2 weeks  Has been taking Azo but not helping  Will go for u/a and culture  Rx macrobid, # 6  Bid for 3 days to [place] for signature

## 2020-09-08 NOTE — TELEPHONE ENCOUNTER
PT  MARISOL   SEEING  DANIA  IN January  ASKING  TO  HAVE  SOME  LABS  DONE  BEFORE  HER  VISIT   NEEDS  ORDER

## 2020-09-08 NOTE — TELEPHONE ENCOUNTER
Pt has has UTI symptoms for 2 weeks tried OVC med and had little relief, enough to take edge off, but has pain in lower back and burring when urinating  She feels like she has to go all the time but not much coming out      Perferred pharmacy [facility] [address]    Best call back [phone]

## 2020-09-09 LAB — BACTERIA UR CULT: NORMAL

## 2020-10-01 DIAGNOSIS — F32.A DEPRESSION, UNSPECIFIED DEPRESSION TYPE: ICD-10-CM

## 2020-10-02 RX ORDER — ESCITALOPRAM OXALATE 20 MG/1
TABLET ORAL
Qty: 90 TABLET | Refills: 0 | Status: SHIPPED | OUTPATIENT
Start: 2020-10-02 | End: 2020-12-08 | Stop reason: SDUPTHER

## 2020-12-08 DIAGNOSIS — F32.A DEPRESSION, UNSPECIFIED DEPRESSION TYPE: ICD-10-CM

## 2020-12-08 RX ORDER — ESCITALOPRAM OXALATE 20 MG/1
20 TABLET ORAL DAILY
Qty: 90 TABLET | Refills: 0 | Status: SHIPPED | OUTPATIENT
Start: 2020-12-08 | End: 2021-02-23 | Stop reason: SDUPTHER

## 2021-01-05 ENCOUNTER — TRANSCRIBE ORDERS (OUTPATIENT)
Dept: LAB | Facility: CLINIC | Age: 76
End: 2021-01-05

## 2021-01-05 DIAGNOSIS — C50.211 CARCINOMA OF UPPER-INNER QUADRANT OF FEMALE BREAST, RIGHT (HCC): Primary | ICD-10-CM

## 2021-01-06 ENCOUNTER — TRANSCRIBE ORDERS (OUTPATIENT)
Dept: LAB | Facility: CLINIC | Age: 76
End: 2021-01-06

## 2021-01-06 ENCOUNTER — LAB (OUTPATIENT)
Dept: LAB | Facility: CLINIC | Age: 76
End: 2021-01-06
Payer: MEDICARE

## 2021-01-06 DIAGNOSIS — Z17.1 ESTROGEN RECEPTOR NEGATIVE STATUS (ER-): ICD-10-CM

## 2021-01-06 DIAGNOSIS — C50.211 MALIGNANT NEOPLASM OF UPPER-INNER QUADRANT OF RIGHT FEMALE BREAST, UNSPECIFIED ESTROGEN RECEPTOR STATUS (HCC): Primary | ICD-10-CM

## 2021-01-06 DIAGNOSIS — C50.211 MALIGNANT NEOPLASM OF UPPER-INNER QUADRANT OF RIGHT FEMALE BREAST, UNSPECIFIED ESTROGEN RECEPTOR STATUS (HCC): ICD-10-CM

## 2021-01-06 LAB
ALBUMIN SERPL BCP-MCNC: 3.5 G/DL (ref 3.5–5)
ALP SERPL-CCNC: 70 U/L (ref 46–116)
ALT SERPL W P-5'-P-CCNC: 30 U/L (ref 12–78)
ANION GAP SERPL CALCULATED.3IONS-SCNC: 0 MMOL/L (ref 4–13)
AST SERPL W P-5'-P-CCNC: 63 U/L (ref 5–45)
BASOPHILS # BLD AUTO: 0.04 THOUSANDS/ΜL (ref 0–0.1)
BASOPHILS NFR BLD AUTO: 1 % (ref 0–1)
BILIRUB SERPL-MCNC: 0.53 MG/DL (ref 0.2–1)
BUN SERPL-MCNC: 21 MG/DL (ref 5–25)
CALCIUM SERPL-MCNC: 9.3 MG/DL (ref 8.3–10.1)
CHLORIDE SERPL-SCNC: 108 MMOL/L (ref 100–108)
CO2 SERPL-SCNC: 29 MMOL/L (ref 21–32)
CREAT SERPL-MCNC: 0.66 MG/DL (ref 0.6–1.3)
EOSINOPHIL # BLD AUTO: 0.26 THOUSAND/ΜL (ref 0–0.61)
EOSINOPHIL NFR BLD AUTO: 5 % (ref 0–6)
ERYTHROCYTE [DISTWIDTH] IN BLOOD BY AUTOMATED COUNT: 14 % (ref 11.6–15.1)
GFR SERPL CREATININE-BSD FRML MDRD: 87 ML/MIN/1.73SQ M
GLUCOSE P FAST SERPL-MCNC: 106 MG/DL (ref 65–99)
HCT VFR BLD AUTO: 41.5 % (ref 34.8–46.1)
HGB BLD-MCNC: 13.8 G/DL (ref 11.5–15.4)
IMM GRANULOCYTES # BLD AUTO: 0.02 THOUSAND/UL (ref 0–0.2)
IMM GRANULOCYTES NFR BLD AUTO: 0 % (ref 0–2)
LYMPHOCYTES # BLD AUTO: 0.93 THOUSANDS/ΜL (ref 0.6–4.47)
LYMPHOCYTES NFR BLD AUTO: 18 % (ref 14–44)
MCH RBC QN AUTO: 30.9 PG (ref 26.8–34.3)
MCHC RBC AUTO-ENTMCNC: 33.3 G/DL (ref 31.4–37.4)
MCV RBC AUTO: 93 FL (ref 82–98)
MONOCYTES # BLD AUTO: 0.41 THOUSAND/ΜL (ref 0.17–1.22)
MONOCYTES NFR BLD AUTO: 8 % (ref 4–12)
NEUTROPHILS # BLD AUTO: 3.54 THOUSANDS/ΜL (ref 1.85–7.62)
NEUTS SEG NFR BLD AUTO: 68 % (ref 43–75)
NRBC BLD AUTO-RTO: 0 /100 WBCS
PLATELET # BLD AUTO: 142 THOUSANDS/UL (ref 149–390)
PMV BLD AUTO: 11.1 FL (ref 8.9–12.7)
POTASSIUM SERPL-SCNC: 5 MMOL/L (ref 3.5–5.3)
PROT SERPL-MCNC: 7.4 G/DL (ref 6.4–8.2)
RBC # BLD AUTO: 4.47 MILLION/UL (ref 3.81–5.12)
SODIUM SERPL-SCNC: 137 MMOL/L (ref 136–145)
WBC # BLD AUTO: 5.2 THOUSAND/UL (ref 4.31–10.16)

## 2021-01-06 PROCEDURE — 85025 COMPLETE CBC W/AUTO DIFF WBC: CPT

## 2021-01-06 PROCEDURE — 36415 COLL VENOUS BLD VENIPUNCTURE: CPT

## 2021-01-06 PROCEDURE — 80053 COMPREHEN METABOLIC PANEL: CPT

## 2021-02-23 ENCOUNTER — APPOINTMENT (OUTPATIENT)
Dept: LAB | Facility: CLINIC | Age: 76
End: 2021-02-23
Payer: MEDICARE

## 2021-02-23 ENCOUNTER — TELEPHONE (OUTPATIENT)
Dept: INTERNAL MEDICINE CLINIC | Facility: CLINIC | Age: 76
End: 2021-02-23

## 2021-02-23 ENCOUNTER — OFFICE VISIT (OUTPATIENT)
Dept: INTERNAL MEDICINE CLINIC | Facility: CLINIC | Age: 76
End: 2021-02-23
Payer: MEDICARE

## 2021-02-23 VITALS
SYSTOLIC BLOOD PRESSURE: 126 MMHG | WEIGHT: 231 LBS | HEART RATE: 80 BPM | BODY MASS INDEX: 46.57 KG/M2 | HEIGHT: 59 IN | OXYGEN SATURATION: 98 % | TEMPERATURE: 98.1 F | DIASTOLIC BLOOD PRESSURE: 86 MMHG

## 2021-02-23 DIAGNOSIS — C50.211 MALIGNANT NEOPLASM OF UPPER-INNER QUADRANT OF RIGHT BREAST IN FEMALE, ESTROGEN RECEPTOR NEGATIVE (HCC): Primary | ICD-10-CM

## 2021-02-23 DIAGNOSIS — Z11.59 NEED FOR HEPATITIS C SCREENING TEST: ICD-10-CM

## 2021-02-23 DIAGNOSIS — E78.5 BORDERLINE HYPERLIPIDEMIA: ICD-10-CM

## 2021-02-23 DIAGNOSIS — R73.03 PREDIABETES: Chronic | ICD-10-CM

## 2021-02-23 DIAGNOSIS — F33.42 RECURRENT MAJOR DEPRESSION IN FULL REMISSION (HCC): ICD-10-CM

## 2021-02-23 DIAGNOSIS — Z17.1 MALIGNANT NEOPLASM OF UPPER-INNER QUADRANT OF RIGHT BREAST IN FEMALE, ESTROGEN RECEPTOR NEGATIVE (HCC): Primary | ICD-10-CM

## 2021-02-23 DIAGNOSIS — M19.049 HAND ARTHRITIS: ICD-10-CM

## 2021-02-23 DIAGNOSIS — D69.6 THROMBOCYTOPENIA (HCC): ICD-10-CM

## 2021-02-23 DIAGNOSIS — E66.01 MORBID OBESITY WITH BMI OF 45.0-49.9, ADULT (HCC): Chronic | ICD-10-CM

## 2021-02-23 PROBLEM — C50.911 MALIGNANT NEOPLASM OF RIGHT BREAST IN FEMALE, ESTROGEN RECEPTOR NEGATIVE (HCC): Status: ACTIVE | Noted: 2020-06-25

## 2021-02-23 PROBLEM — G47.39 OTHER SLEEP APNEA: Status: RESOLVED | Noted: 2018-04-11 | Resolved: 2021-02-23

## 2021-02-23 PROBLEM — Z01.419 ENCOUNTER FOR GYNECOLOGICAL EXAMINATION WITHOUT ABNORMAL FINDING: Status: RESOLVED | Noted: 2018-04-11 | Resolved: 2021-02-23

## 2021-02-23 PROBLEM — Z78.0 POSTMENOPAUSAL: Status: RESOLVED | Noted: 2018-04-11 | Resolved: 2021-02-23

## 2021-02-23 LAB
CHOLEST SERPL-MCNC: 179 MG/DL (ref 50–200)
EST. AVERAGE GLUCOSE BLD GHB EST-MCNC: 117 MG/DL
HBA1C MFR BLD: 5.7 %
HCV AB SER QL: NORMAL
HDLC SERPL-MCNC: 52 MG/DL
LDLC SERPL CALC-MCNC: 82 MG/DL (ref 0–100)
NONHDLC SERPL-MCNC: 127 MG/DL
TRIGL SERPL-MCNC: 226 MG/DL

## 2021-02-23 PROCEDURE — 36415 COLL VENOUS BLD VENIPUNCTURE: CPT

## 2021-02-23 PROCEDURE — 80061 LIPID PANEL: CPT

## 2021-02-23 PROCEDURE — 86803 HEPATITIS C AB TEST: CPT

## 2021-02-23 PROCEDURE — 83036 HEMOGLOBIN GLYCOSYLATED A1C: CPT

## 2021-02-23 PROCEDURE — 99214 OFFICE O/P EST MOD 30 MIN: CPT | Performed by: INTERNAL MEDICINE

## 2021-02-23 RX ORDER — ESCITALOPRAM OXALATE 20 MG/1
20 TABLET ORAL DAILY
Qty: 90 TABLET | Refills: 3 | Status: SHIPPED | OUTPATIENT
Start: 2021-02-23 | End: 2022-02-15

## 2021-02-23 RX ORDER — MELOXICAM 7.5 MG/1
7.5 TABLET ORAL DAILY PRN
Qty: 30 TABLET | Refills: 3 | Status: SHIPPED | OUTPATIENT
Start: 2021-02-23 | End: 2021-06-17 | Stop reason: ALTCHOICE

## 2021-02-23 NOTE — TELEPHONE ENCOUNTER
----- Message from Efrain Leo DO sent at 2/23/2021  2:46 PM EST -----  Call patient and let her know prediabetes has improved  Cholesterol is stable

## 2021-02-23 NOTE — PATIENT INSTRUCTIONS
Obesity   AMBULATORY CARE:   Obesity  is when your body mass index (BMI) is greater than 30  Your healthcare provider will use your height and weight to measure your BMI  The risks of obesity include  many health problems, such as injuries or physical disability  You may need tests to check for the following:  · Diabetes    · High blood pressure or high cholesterol    · Heart disease    · Gallbladder or liver disease    · Cancer of the colon, breast, prostate, liver, or kidney    · Sleep apnea    · Arthritis or gout    Seek care immediately if:   · You have a severe headache, confusion, or difficulty speaking  · You have weakness on one side of your body  · You have chest pain, sweating, or shortness of breath  Contact your healthcare provider if:   · You have symptoms of gallbladder or liver disease, such as pain in your upper abdomen  · You have knee or hip pain and discomfort while walking  · You have symptoms of diabetes, such as intense hunger and thirst, and frequent urination  · You have symptoms of sleep apnea, such as snoring or daytime sleepiness  · You have questions or concerns about your condition or care  Treatment for obesity  focuses on helping you lose weight to improve your health  Even a small decrease in BMI can reduce the risk for many health problems  Your healthcare provider will help you set a weight-loss goal   · Lifestyle changes  are the first step in treating obesity  These include making healthy food choices and getting regular physical activity  Your healthcare provider may suggest a weight-loss program that involves coaching, education, and therapy  · Medicine  may help you lose weight when it is used with a healthy diet and physical activity  · Surgery  can help you lose weight if you are very obese and have other health problems  There are several types of weight-loss surgery  Ask your healthcare provider for more information      Be successful losing weight:   · Set small, realistic goals  An example of a small goal is to walk for 20 minutes 5 days a week  Anther goal is to lose 5% of your body weight  · Tell friends, family members, and coworkers about your goals  and ask for their support  Ask a friend to lose weight with you, or join a weight-loss support group  · Identify foods or triggers that may cause you to overeat , and find ways to avoid them  Remove tempting high-calorie foods from your home and workplace  Place a bowl of fresh fruit on your kitchen counter  If stress causes you to eat, then find other ways to cope with stress  · Keep a diary to track what you eat and drink  Also write down how many minutes of physical activity you do each day  Weigh yourself once a week and record it in your diary  Eating changes: You will need to eat 500 to 1,000 fewer calories each day than you currently eat to lose 1 to 2 pounds a week  The following changes will help you cut calories:  · Eat smaller portions  Use small plates, no larger than 9 inches in diameter  Fill your plate half full of fruits and vegetables  Measure your food using measuring cups until you know what a serving size looks like  · Eat 3 meals and 1 or 2 snacks each day  Plan your meals in advance  Maria Fernanda Romero and eat at home most of the time  Eat slowly  Do not skip meals  Skipping meals can lead to overeating later in the day  This can make it harder for you to lose weight  Talk with a dietitian to help you make a meal plan and schedule that is right for you  · Eat fruits and vegetables at every meal   They are low in calories and high in fiber, which makes you feel full  Do not add butter, margarine, or cream sauce to vegetables  Use herbs to season steamed vegetables  · Eat less fat and fewer fried foods  Eat more baked or grilled chicken and fish  These protein sources are lower in calories and fat than red meat  Limit fast food   Dress your salads with olive oil and vinegar instead of bottled dressing  · Limit the amount of sugar you eat  Do not drink sugary beverages  Limit alcohol  Activity changes:  Physical activity is good for your body in many ways  It helps you burn calories and build strong muscles  It decreases stress and depression, and improves your mood  It can also help you sleep better  Talk to your healthcare provider before you begin an exercise program   · Exercise for at least 30 minutes 5 days a week  Start slowly  Set aside time each day for physical activity that you enjoy and that is convenient for you  It is best to do both weight training and an activity that increases your heart rate, such as walking, bicycling, or swimming  · Find ways to be more active  Do yard work and housecleaning  Walk up the stairs instead of using elevators  Spend your leisure time going to events that require walking, such as outdoor festivals or fairs  This extra physical activity can help you lose weight and keep it off  Follow up with your healthcare provider as directed: You may need to meet with a dietitian  Write down your questions so you remember to ask them during your visits  © Copyright [facility] [address] Information is for End User's use only and may not be sold, redistributed or otherwise used for commercial purposes  All illustrations and images included in CareNotes® are the copyrighted property of [employer]  or [facility]   The above information is an  only  It is not intended as medical advice for individual conditions or treatments  Talk to your doctor, nurse or pharmacist before following any medical regimen to see if it is safe and effective for you

## 2021-02-23 NOTE — PROGRESS NOTES
[facility] [address] [place] [address]    NAME: Yi Foy  AGE: 76 y o  SEX: female  : 1945     DATE: 2021     Assessment and Plan:     1  Malignant neoplasm of upper-inner quadrant of right breast in female, estrogen receptor negative ([place] [address] )    Follow-up with oncology  UTD with mammogram  Has undergone lumpectomy and radiation  Triple negative disease  2  Recurrent major depression in full remission ([place] [address] )    She is stable on lexapro  PHQ-9 is 0     - escitalopram (LEXAPRO) 20 mg tablet; Take 1 tablet (20 mg total) by mouth daily  Dispense: 90 tablet; Refill: 3    3  Prediabetes    Check UTD A1c  Diet and lifestyle changes encouraged  Most recent A1c was 5 9 % on 2020     - HEMOGLOBIN A1C W/ EAG ESTIMATION; Future    4  Borderline hyperlipidemia    Lab Results   Component Value Date    LDLCALC 104 (H) 2020      No underlying ASCVD  Check UTD labs  - Lipid panel; Future    5  Thrombocytopenia (HCC)    Mild and stable  No increased bleeding  6  Morbid obesity with BMI of 45 0-49 9, adult ([place] [address] )    Body mass index is 47 46 kg/m²  Discussed the patient's BMI with her  The BMI is above normal  Nutrition recommendations include reducing portion sizes  7  Hand arthritis    Discussed treatment for this and weight loss  - Diclofenac Sodium (VOLTAREN) 1 %; Apply 2 g topically 4 (four) times a day  Dispense: 200 g; Refill: 3  - meloxicam (MOBIC) 7 5 mg tablet; Take 1 tablet (7 5 mg total) by mouth daily as needed for moderate pain  Dispense: 30 tablet; Refill: 3    8  Need for hepatitis C screening test  - Hepatitis C Antibody ([facility]); Future          Return in about 6 months (around 2021) for Subsequent AWV  Chief Complaint:     Chief Complaint   Patient presents with    Follow-up        History of Present Illness:     Patient presents for follow-up and to establish care with myself  Former patient of Dr Vanessa Nuñez      Follows with oncology due to right sided breast cancer  She is triple negative and has undergone lumpectomy and radiation therapy  Mildly low platelets chronically which have been stable  Depression - chronic but well controlled on medication    Arthritis - suffers most in her hands/knee/hips  Wondering what medication can she take  Prediabetes - BMI elevated at 47 46  Most recent A1c was 5 9 % on 6/16/2020  Has struggled with weight for many years  Review of Systems:     Review of Systems   Constitutional: Negative for activity change, appetite change and fatigue  Respiratory: Negative for apnea, cough, chest tightness, shortness of breath and wheezing  Cardiovascular: Negative for chest pain, palpitations and leg swelling  Gastrointestinal: Negative for abdominal distention, abdominal pain, blood in stool, constipation, diarrhea, nausea and vomiting  Musculoskeletal: Positive for arthralgias  Negative for back pain, gait problem, joint swelling and myalgias  Skin:        Dryness and itchiness   Neurological: Negative for dizziness, weakness, light-headedness, numbness and headaches  Psychiatric/Behavioral: Negative for behavioral problems, confusion, hallucinations, sleep disturbance and suicidal ideas  The patient is not nervous/anxious  Objective:     /86 (BP Location: Left arm, Patient Position: Sitting, Cuff Size: Standard)   Pulse 80   Temp 98 1 °F (36 7 °C) (Temporal) Comment: NO NSAIDS  Ht 4' 10 5" (1 486 m)   Wt 105 kg (231 lb)   SpO2 98%   BMI 47 46 kg/m²     Physical Exam  Vitals signs reviewed  Constitutional:       General: She is not in acute distress  Appearance: She is well-developed  She is obese  She is not diaphoretic  Eyes:      General: No scleral icterus  Right eye: No discharge  Left eye: No discharge  Conjunctiva/sclera: Conjunctivae normal    Neck:      Musculoskeletal: Neck supple  Thyroid: No thyromegaly  Vascular: No JVD  Cardiovascular:      Rate and Rhythm: Normal rate and regular rhythm  Heart sounds: Normal heart sounds  No murmur  Pulmonary:      Effort: Pulmonary effort is normal  No respiratory distress  Breath sounds: Normal breath sounds  No wheezing or rales  Abdominal:      General: Bowel sounds are normal  There is no distension  Palpations: Abdomen is soft  Tenderness: There is no abdominal tenderness  Musculoskeletal:         General: Deformity (hand arthritis) present  Right lower leg: No edema  Left lower leg: No edema  Lymphadenopathy:      Cervical: No cervical adenopathy  Skin:     General: Skin is dry  Neurological:      Mental Status: She is alert     Psychiatric:         Mood and Affect: Mood normal          Behavior: Behavior normal        Chepe Iqbal DO  MEDICAL [id number]

## 2021-03-13 ENCOUNTER — OFFICE VISIT (OUTPATIENT)
Dept: INTERNAL MEDICINE CLINIC | Facility: CLINIC | Age: 76
End: 2021-03-13
Payer: MEDICARE

## 2021-03-13 VITALS
SYSTOLIC BLOOD PRESSURE: 130 MMHG | BODY MASS INDEX: 47.41 KG/M2 | DIASTOLIC BLOOD PRESSURE: 86 MMHG | OXYGEN SATURATION: 95 % | HEART RATE: 74 BPM | WEIGHT: 235.2 LBS | HEIGHT: 59 IN | TEMPERATURE: 97.8 F

## 2021-03-13 DIAGNOSIS — M54.9 ACUTE MID BACK PAIN: Primary | ICD-10-CM

## 2021-03-13 PROCEDURE — 99214 OFFICE O/P EST MOD 30 MIN: CPT | Performed by: FAMILY MEDICINE

## 2021-03-13 RX ORDER — CYCLOBENZAPRINE HCL 5 MG
5 TABLET ORAL 2 TIMES DAILY
Qty: 28 TABLET | Refills: 0 | Status: SHIPPED | OUTPATIENT
Start: 2021-03-13 | End: 2022-03-31 | Stop reason: CLARIF

## 2021-03-13 RX ORDER — METHYLPREDNISOLONE 4 MG/1
TABLET ORAL
Qty: 21 EACH | Refills: 0 | Status: SHIPPED | OUTPATIENT
Start: 2021-03-13 | End: 2021-06-17 | Stop reason: ALTCHOICE

## 2021-03-13 NOTE — PROGRESS NOTES
FOLLOW-UP OFFICE VISIT  [facility] - [facility]    NAME: Yi Foy  AGE: 76 y o  SEX: female  : 1945     DATE: 3/13/2021     Assessment and Plan:     1  Acute mid back pain- etiology is musculoskeletal  Low clinical suspicion for pulmonary pathology or rib fracture  No bony tenderness along the spinous processes  Will treat with steroidal antiinflammatories and muscle relaxer  Pt to continue home stretches as tolerated  - methylPREDNISolone 4 MG tablet therapy pack; Use as directed on package  Dispense: 21 each; Refill: 0  - cyclobenzaprine (FLEXERIL) 5 mg tablet; Take 1 tablet (5 mg total) by mouth 2 (two) times a day for 14 days  Dispense: 28 tablet; Refill: 0          Return if symptoms worsen or fail to improve  Chief Complaint:     Chief Complaint   Patient presents with    Back Pain        History of Present Illness:   77 yo female pmhx listed below present for sick visit  C/o back pain  Onset last week  Location; mid upper back  No radiation  Rotating her self left or right aggravates the pain  she is limited due to the pain so she has trouble reaching behind herself to wipe after using the rest room  Quality of pain is sharp  Using heat and ice but that isn't providing any relief    Sleeping in a recliner seems to help a bit  No paraesthesia, numbness of lower or upper extremities or bowel or bladder incontience  Deneis  fall or trauma   Has seen chiropractor 3 times and pain is still present but mildly improved as of today  Review of Systems:     Review of Systems   Constitutional: Negative for fever  Respiratory: Negative for shortness of breath  Cardiovascular: Negative for chest pain  Musculoskeletal: Positive for gait problem  Neurological: Negative for dizziness and headaches          Problem List:     Patient Active Problem List   Diagnosis    Depression    Prediabetes    Morbid obesity with BMI of 45 0-49 9, adult ([address] )    Borderline hyperlipidemia    Osteopenia of multiple sites    Vitamin D deficiency    CASEY (obstructive sleep apnea)    Malignant neoplasm of right breast in female, estrogen receptor negative (HCC)    Other irritable bowel syndrome    Thrombocytopenia (HCC)        Objective:     /86   Pulse 74   Temp 97 8 °F (36 6 °C)   Ht 4' 10 5" (1 486 m)   Wt 107 kg (235 lb 3 2 oz)   SpO2 95%   BMI 48 32 kg/m²     Physical Exam  Constitutional:       Appearance: She is not ill-appearing  HENT:      Head: Normocephalic and atraumatic  Right Ear: External ear normal       Left Ear: External ear normal    Eyes:      Conjunctiva/sclera: Conjunctivae normal    Cardiovascular:      Rate and Rhythm: Normal rate  Pulmonary:      Effort: Pulmonary effort is normal    Musculoskeletal:      Thoracic back: She exhibits decreased range of motion (decreased rotation of the Tspine to the right due to pain  ) and spasm  She exhibits no bony tenderness and no swelling  Right lower leg: No edema  Left lower leg: No edema  Neurological:      Mental Status: She is alert and oriented to person, place, and time        Gait: Gait normal    Psychiatric:         Mood and Affect: Mood normal          Behavior: Behavior normal                   Sandra MULLEN [facility] [place]: [facility]  3/13/2021 8:20 AM

## 2021-03-13 NOTE — PATIENT INSTRUCTIONS
Stat the steroid taper  Start using cyclobenzaprine one tablet 2 times a day as needed  Do some of the stretches we talked about after a warm shower  Continue to see the chiropractor

## 2021-04-09 ENCOUNTER — APPOINTMENT (OUTPATIENT)
Dept: LAB | Facility: CLINIC | Age: 76
End: 2021-04-09
Payer: MEDICARE

## 2021-04-09 ENCOUNTER — TELEPHONE (OUTPATIENT)
Dept: OBGYN CLINIC | Facility: CLINIC | Age: 76
End: 2021-04-09

## 2021-04-09 DIAGNOSIS — N30.00 ACUTE CYSTITIS WITHOUT HEMATURIA: ICD-10-CM

## 2021-04-09 DIAGNOSIS — N30.00 ACUTE CYSTITIS WITHOUT HEMATURIA: Primary | ICD-10-CM

## 2021-04-09 LAB
BACTERIA UR QL AUTO: ABNORMAL /HPF
BILIRUB UR QL STRIP: NEGATIVE
CLARITY UR: CLEAR
COLOR UR: ABNORMAL
GLUCOSE UR STRIP-MCNC: NEGATIVE MG/DL
HGB UR QL STRIP.AUTO: NEGATIVE
HYALINE CASTS #/AREA URNS LPF: ABNORMAL /LPF
KETONES UR STRIP-MCNC: NEGATIVE MG/DL
LEUKOCYTE ESTERASE UR QL STRIP: NEGATIVE
NITRITE UR QL STRIP: POSITIVE
NON-SQ EPI CELLS URNS QL MICRO: ABNORMAL /HPF
PH UR STRIP.AUTO: 6 [PH]
PROT UR STRIP-MCNC: NEGATIVE MG/DL
RBC #/AREA URNS AUTO: ABNORMAL /HPF
SP GR UR STRIP.AUTO: 1.01 (ref 1–1.03)
UROBILINOGEN UR QL STRIP.AUTO: 1 E.U./DL
WBC #/AREA URNS AUTO: ABNORMAL /HPF

## 2021-04-09 PROCEDURE — 87086 URINE CULTURE/COLONY COUNT: CPT

## 2021-04-09 PROCEDURE — 81001 URINALYSIS AUTO W/SCOPE: CPT

## 2021-04-09 PROCEDURE — 87077 CULTURE AEROBIC IDENTIFY: CPT

## 2021-04-09 RX ORDER — NITROFURANTOIN 25; 75 MG/1; MG/1
100 CAPSULE ORAL 2 TIMES DAILY
Qty: 6 CAPSULE | Refills: 0 | Status: SHIPPED | OUTPATIENT
Start: 2021-04-09 | End: 2021-04-12

## 2021-04-09 NOTE — TELEPHONE ENCOUNTER
Per comm consent lm to pt that per [facility] protocols, I was ordering a UA for her based on Angela's detailed msg  Will also pend meds per [facility] protocol

## 2021-04-09 NOTE — TELEPHONE ENCOUNTER
Patient called very frustrated stating she called earlier to get a medication for UTI and called pharmacy and nothing is there  She is worried she will go all weekend without abx

## 2021-04-09 NOTE — TELEPHONE ENCOUNTER
Pt sees Olesya  She is having symptoms of UTI- burning with urination, frequency, no odor or other symptoms  Has been going on for 3 days  She has been taking AZO, getting some relief  Requesting something to be sent in  Pt also did not have annual scheduled so I did scheduled her for June with Olesya  Please call pt back with plan  Constitutional: (-) fever  Eyes/ENT: (-) blurry vision, (-) epistaxis  Cardiovascular: (+) chest pain, (-) syncope  Respiratory: (-) cough, (-) shortness of breath  Gastrointestinal: (-) vomiting, (-) diarrhea  : (-) dysuria, (-) hematuria  Musculoskeletal: (-) neck pain, (-) back pain, (-) joint pain  Integumentary: (-) rash, (-) edema  Neurological: (-) headache, (-) altered mental status  Allergic/Immunologic: (-) pruritus

## 2021-04-11 LAB
BACTERIA UR CULT: ABNORMAL
BACTERIA UR CULT: ABNORMAL

## 2021-04-12 ENCOUNTER — TELEPHONE (OUTPATIENT)
Dept: OBGYN CLINIC | Facility: CLINIC | Age: 76
End: 2021-04-12

## 2021-04-12 NOTE — TELEPHONE ENCOUNTER
Spoke to pt and she finished meds and is feeling little better will call if she needs anything    ----- Message from Kartik Parra sent at 4/12/2021  7:30 AM EDT -----  Pls notify her urine cx showed an infection so she should complete her abx and call if not better after treatment   Thx

## 2021-04-14 ENCOUNTER — TELEPHONE (OUTPATIENT)
Dept: OBGYN CLINIC | Age: 76
End: 2021-04-14

## 2021-04-14 DIAGNOSIS — R32 URINARY INCONTINENCE, UNSPECIFIED TYPE: Primary | ICD-10-CM

## 2021-04-14 NOTE — TELEPHONE ENCOUNTER
Patient calling to request refill on nitrofurantoin     Please send to [facility] on [address]     Thank you!

## 2021-04-15 NOTE — TELEPHONE ENCOUNTER
Pt called bk to say she finished the macrobid last week and her burning is gone but now she just has bladder leakage, she cannot make it to the bathroom in time,

## 2021-05-21 ENCOUNTER — TELEPHONE (OUTPATIENT)
Dept: INTERNAL MEDICINE CLINIC | Facility: CLINIC | Age: 76
End: 2021-05-21

## 2021-05-21 NOTE — TELEPHONE ENCOUNTER
Release Point requesting patient's medical records       RP ID:  [id number]      Request faxed to [facility] \[facility]\""

## 2021-06-17 ENCOUNTER — ANNUAL EXAM (OUTPATIENT)
Dept: OBGYN CLINIC | Facility: CLINIC | Age: 76
End: 2021-06-17
Payer: MEDICARE

## 2021-06-17 VITALS
BODY MASS INDEX: 47.58 KG/M2 | WEIGHT: 236 LBS | DIASTOLIC BLOOD PRESSURE: 88 MMHG | SYSTOLIC BLOOD PRESSURE: 130 MMHG | HEIGHT: 59 IN

## 2021-06-17 DIAGNOSIS — Z91.89 GYN EXAM FOR HIGH-RISK MEDICARE PATIENT: Primary | ICD-10-CM

## 2021-06-17 DIAGNOSIS — Z78.0 POSTMENOPAUSAL: ICD-10-CM

## 2021-06-17 DIAGNOSIS — R32 URINARY INCONTINENCE, UNSPECIFIED TYPE: ICD-10-CM

## 2021-06-17 DIAGNOSIS — B35.6 TINEA CRURIS: ICD-10-CM

## 2021-06-17 DIAGNOSIS — Z01.419 ENCOUNTER FOR GYNECOLOGICAL EXAMINATION WITHOUT ABNORMAL FINDING: ICD-10-CM

## 2021-06-17 DIAGNOSIS — B35.6 TINEA CRURIS: Primary | ICD-10-CM

## 2021-06-17 PROCEDURE — G0101 CA SCREEN;PELVIC/BREAST EXAM: HCPCS | Performed by: NURSE PRACTITIONER

## 2021-06-17 RX ORDER — NYSTATIN AND TRIAMCINOLONE ACETONIDE 100000; 1 [USP'U]/G; MG/G
OINTMENT TOPICAL 2 TIMES DAILY
Qty: 45 G | Refills: 1 | Status: SHIPPED | OUTPATIENT
Start: 2021-06-17 | End: 2021-06-17

## 2021-06-17 RX ORDER — ASPIRIN 81 MG
100 TABLET, DELAYED RELEASE (ENTERIC COATED) ORAL 2 TIMES DAILY
COMMUNITY

## 2021-06-17 RX ORDER — CLOTRIMAZOLE AND BETAMETHASONE DIPROPIONATE 10; .64 MG/G; MG/G
CREAM TOPICAL 2 TIMES DAILY
Qty: 45 G | Refills: 1 | Status: SHIPPED | OUTPATIENT
Start: 2021-06-17 | End: 2022-03-31 | Stop reason: CLARIF

## 2021-06-17 NOTE — PATIENT INSTRUCTIONS
Urinary Incontinence   AMBULATORY CARE:   Urinary incontinence (UI)  is when you lose control of your bladder  UI develops because your bladder cannot store or empty urine properly  The 3 most common types of UI are stress incontinence, urge incontinence, or both  Common symptoms include the following:   · You feel like your bladder does not empty completely when you urinate  · You urinate often and need to urinate immediately  · You leak urine when you sleep, or you wake up with the urge to urinate  · You leak urine when you cough, sneeze, exercise, or laugh  Call your doctor if:   · You have severe pain  · You are confused or cannot think clearly  · You have a fever  · You see blood in your urine  · You have pain when you urinate  · You have new or worse pain, even after treatment  · Your mouth feels dry or you have vision changes  · Your urine is cloudy or smells bad  · You have questions or concerns about your condition or care  Medicines:   · Medicines  may be given to help strengthen your bladder control  · Take your medicine as directed  Contact your healthcare provider if you think your medicine is not helping or if you have side effects  Tell him or her if you are allergic to any medicine  Keep a list of the medicines, vitamins, and herbs you take  Include the amounts, and when and why you take them  Bring the list or the pill bottles to follow-up visits  Carry your medicine list with you in case of an emergency  Do pelvic muscle exercises often:  Your pelvic muscles help you stop urinating  Squeeze these muscles tight for 5 seconds, then relax for 5 seconds  Gradually work up to squeezing for 10 seconds  Do 3 sets of 15 repetitions a day, or as directed  This will help strengthen your pelvic muscles and improve bladder control  Train your bladder:  Go to the bathroom at set times, such as every 2 hours, even if you do not feel the urge to go   You can also try to hold your urine when you feel the urge to go  For example, hold your urine for 5 minutes when you feel the urge to go  As that becomes easier, hold your urine for 10 minutes  Self-care:   · Keep a UI record  Write down how often you leak urine and how much you leak  Make a note of what you were doing when you leaked urine  · Drink liquids as directed  Ask your healthcare provider how much liquid to drink each day and which liquids are best for you  You may need to limit the amount of liquid you drink to help control your urine leakage  Do not drink any liquid right before you go to bed  Limit or do not have drinks that contain caffeine or alcohol  · Prevent constipation  Eat a variety of high-fiber foods  Good examples are high-fiber cereals, beans, vegetables, and whole-grain breads  Prune juice may help make your bowel movement softer  Walking is the best way to trigger your intestines to have a bowel movement  · Exercise regularly and maintain a healthy weight  Ask your healthcare provider how much you should weigh and about the best exercise plan for you  Weight loss and exercise will decrease pressure on your bladder and help you control your leakage  Ask him or her to help you create a weight loss plan if you are overweight  · Use a catheter as directed  to help empty your bladder  A catheter is a tiny, plastic tube that is put into your bladder to drain your urine  Your healthcare provider may tell you to use a catheter to prevent your bladder from getting too full and leaking urine  · Go to behavior therapy as directed  Behavior therapy may be used to help you learn to control your urge to urinate  Follow up with your healthcare provider as directed:  Write down your questions so you remember to ask them during your visits  © Copyright [address] Information is for End User's use only and may not be sold, redistributed or otherwise used for commercial purposes   All illustrations and images included in CareNotes® are the copyrighted property of A D A M , Inc  or Dania Grier  The above information is an  only  It is not intended as medical advice for individual conditions or treatments  Talk to your doctor, nurse or pharmacist before following any medical regimen to see if it is safe and effective for you

## 2021-06-17 NOTE — PROGRESS NOTES
Diagnoses and all orders for this visit:    Encounter for gynecological examination without abnormal finding    Postmenopausal  -     DXA bone density spine hip and pelvis; Future    Urinary incontinence, unspecified type        -     Desitin prn, change liners frequently, declines Urology referral for now  Other orders  -     Follow up with [facility] for her breast cancer, sees Dr Jarquin Sensor of breast        -      Mycolog ordered    Call as needed, encouraged calcium/vit D in her diet, call with any PMB, all questions answered      Pleasant 68 y o  postmenopausal female here for annual exam  She denies postmenopausal bleeding  Denies history of abnormal pap smears, Pap nml 2020, NO pap today  Denies vaginal issues but does have irritation from daily liner use  Denies pelvic pain  Denies menopausal/postmenopausal issues  Rarely sexually active, once per year  DXA done 2018  Pt is seeing [facility] for RIGHT breast cancer, getting mammos every 6 months  Colonoscopy done 2017, not due yet   She has Urinary incontinence but declines Urology referral     Past Medical History:   Diagnosis Date    Benign neoplasm of skin     Breast cancer ([address] )     Bursitis     Right hip    Diverticulitis     Hypercholesterolemia     IBS (irritable bowel syndrome)     Lyme disease     Morbid obesity ([address] )     Partial small bowel obstruction (HCC)     Prediabetes     Sleep apnea 11/01/2018    Vitamin D deficiency      Past Surgical History:   Procedure Laterality Date    BREAST LUMPECTOMY Right     In spring of 2020 as per pt     COLECTOMY      COLONOSCOPY      COMPLETE  2006, 2012    HERNIA REPAIR       Family History   Problem Relation Age of Onset    No Known Problems Mother     Cancer Father         Gallbladder    Hypertension Father     Hypertension Son     Hypotension Son     Breast cancer Neg Hx     Colon cancer Neg Hx     Ovarian cancer Neg Hx     Uterine cancer Neg Hx     Cervical cancer Neg Hx      Social History     Tobacco Use    Smoking status: Never Smoker    Smokeless tobacco: Never Used   Vaping Use    Vaping Use: Never used   Substance Use Topics    Alcohol use: No    Drug use: No       Current Outpatient Medications:     aspirin 81 MG tablet, Take 81 mg by mouth daily, Disp: , Rfl:     Cetirizine HCl (ZYRTEC ALLERGY) 10 MG CAPS, daily , Disp: , Rfl:     cholecalciferol (VITAMIN D3) 1,000 units tablet, Take 1 tablet by mouth 2 (two) times a day, Disp: , Rfl:     Diclofenac Sodium (VOLTAREN) 1 %, Apply 2 g topically 4 (four) times a day, Disp: 200 g, Rfl: 3    Docusate Sodium (Stool Softener) 100 MG capsule, Take 100 mg by mouth 2 (two) times a day, Disp: , Rfl:     escitalopram (LEXAPRO) 20 mg tablet, Take 1 tablet (20 mg total) by mouth daily, Disp: 90 tablet, Rfl: 3    hydrocortisone (PROCTOSOL HC) 2 5 % rectal cream, Insert into the rectum daily as needed (Hemorrhoids), Disp: 30 g, Rfl: 5    Misc Natural Products (FIBER 7) POWD, Take by mouth daily , Disp: , Rfl:     Multiple Vitamins-Minerals (CENTRUM SILVER) tablet, Take by mouth daily , Disp: , Rfl:     VITAMIN B COMPLEX-C PO, Take by mouth daily , Disp: , Rfl:     cyclobenzaprine (FLEXERIL) 5 mg tablet, Take 1 tablet (5 mg total) by mouth 2 (two) times a day for 14 days, Disp: 28 tablet, Rfl: 0  Patient Active Problem List    Diagnosis Date Noted    Thrombocytopenia ([place] [address] ) 2021    Malignant neoplasm of right breast in female, estrogen receptor negative ([place] [address] ) 2020    Other irritable bowel syndrome 2020    CASEY (obstructive sleep apnea)     Vitamin D deficiency 07/10/2018    Borderline hyperlipidemia 05/10/2017    Osteopenia of multiple sites 2016    Morbid obesity with BMI of 45 0-49 9, adult ([facility] ) 2016    Depression 2014    Prediabetes 2014       No Known Allergies    OB History    Para Term  AB Living   2 2 2     2   SAB TAB Ectopic Multiple Live Births           2      # Outcome Date GA Lbr Jaziel/2nd Weight Sex Delivery Anes PTL Lv   2 Term            1 Term              2 sons, 6 grands (4 in [facility] area)    Vitals:    06/17/21 0814   BP: 130/88   BP Location: Left arm   Patient Position: Sitting   Cuff Size: Standard   Weight: 107 kg (236 lb)   Height: 4' 11" (1 499 m)     Body mass index is 47 67 kg/m²  Review of Systems   Constitutional: Negative for chills, fatigue, fever and unexpected weight change  Respiratory: Negative for shortness of breath  Gastrointestinal: Negative for anal bleeding, blood in stool, constipation and diarrhea  Genitourinary: Negative for difficulty urinating, dysuria and hematuria  Physical Exam   Constitutional: She appears well-developed and well-nourished  No distress  HENT: atraumatic, EOMI  Head: Normocephalic  Neck: Normal range of motion  Neck supple  Pulmonary: Effort normal   Breasts: LEFT without masses, skin changes or nipple discharge  RIGHT breast healed scars on breast and axilla  Bilaterally warm to touch  Left without areas of erythema or pain  Abdominal: Soft  Pelvic exam was performed with patient supine  No labial fusion  There is no rash, tenderness, lesion or injury on the right labia  There is no rash, tenderness, lesion or injury on the left labia  Urethral meatus does not show any tenderness, inflammation or discharge  Palpation of midline bladder without pain or discomfort  Uterus is not deviated, not enlarged, not fixed and not tender  Cervix exhibits no motion tenderness, no discharge and no friability  Right adnexum displays no mass, no tenderness and no fullness  Left adnexum displays no mass, no tenderness and no fullness  No erythema or tenderness in the vagina  No foreign body in the vagina  No signs of injury around the vagina or anus  Perineum without lesions, signs of injury, erythema or swelling  No vaginal discharge found  Lymphadenopathy:        Right: No inguinal adenopathy present  Left: No inguinal adenopathy present     LIMITED EXAM D/T OBESITY

## 2021-08-23 ENCOUNTER — OFFICE VISIT (OUTPATIENT)
Dept: INTERNAL MEDICINE CLINIC | Facility: CLINIC | Age: 76
End: 2021-08-23
Payer: MEDICARE

## 2021-08-23 ENCOUNTER — APPOINTMENT (OUTPATIENT)
Dept: LAB | Facility: CLINIC | Age: 76
End: 2021-08-23
Payer: MEDICARE

## 2021-08-23 VITALS
WEIGHT: 234.2 LBS | SYSTOLIC BLOOD PRESSURE: 124 MMHG | HEIGHT: 59 IN | BODY MASS INDEX: 47.21 KG/M2 | RESPIRATION RATE: 14 BRPM | DIASTOLIC BLOOD PRESSURE: 82 MMHG | HEART RATE: 68 BPM | TEMPERATURE: 96.7 F | OXYGEN SATURATION: 94 %

## 2021-08-23 DIAGNOSIS — R39.9 UTI SYMPTOMS: ICD-10-CM

## 2021-08-23 DIAGNOSIS — R39.9 UTI SYMPTOMS: Primary | ICD-10-CM

## 2021-08-23 PROCEDURE — 1123F ACP DISCUSS/DSCN MKR DOCD: CPT | Performed by: NURSE PRACTITIONER

## 2021-08-23 PROCEDURE — 99212 OFFICE O/P EST SF 10 MIN: CPT | Performed by: NURSE PRACTITIONER

## 2021-08-23 PROCEDURE — 81003 URINALYSIS AUTO W/O SCOPE: CPT

## 2021-08-23 RX ORDER — NITROFURANTOIN 25; 75 MG/1; MG/1
100 CAPSULE ORAL 2 TIMES DAILY
Qty: 10 CAPSULE | Refills: 0 | Status: SHIPPED | OUTPATIENT
Start: 2021-08-23 | End: 2021-08-28

## 2021-08-23 NOTE — PATIENT INSTRUCTIONS
Medicare Preventive Visit Patient Instructions  Thank you for completing your Welcome to Medicare Visit or Medicare Annual Wellness Visit today  Your next wellness visit will be due in one year (8/24/2022)  The screening/preventive services that you may require over the next 5-10 years are detailed below  Some tests may not apply to you based off risk factors and/or age  Screening tests ordered at today's visit but not completed yet may show as past due  Also, please note that scanned in results may not display below  Preventive Screenings:  Service Recommendations Previous Testing/Comments   Colorectal Cancer Screening  * Colonoscopy    * Fecal Occult Blood Test (FOBT)/Fecal Immunochemical Test (FIT)  * Fecal DNA/Cologuard Test  * Flexible Sigmoidoscopy Age: 54-65 years old   Colonoscopy: every 10 years (may be performed more frequently if at higher risk)  OR  FOBT/FIT: every 1 year  OR  Cologuard: every 3 years  OR  Sigmoidoscopy: every 5 years  Screening may be recommended earlier than age 48 if at higher risk for colorectal cancer  Also, an individualized decision between you and your healthcare provider will decide whether screening between the ages of 74-80 would be appropriate  Colonoscopy: 01/18/2017  FOBT/FIT: Not on file  Cologuard: Not on file  Sigmoidoscopy: Not on file          Breast Cancer Screening Age: 36 years old  Frequency: every 1-2 years  Not required if history of left and right mastectomy Mammogram: 05/21/2019    History Breast Cancer   Cervical Cancer Screening Between the ages of 21-29, pap smear recommended once every 3 years  Between the ages of 33-67, can perform pap smear with HPV co-testing every 5 years     Recommendations may differ for women with a history of total hysterectomy, cervical cancer, or abnormal pap smears in past  Pap Smear: 06/17/2021    Screening Not Indicated   Hepatitis C Screening Once for adults born between 1945 and 1965  More frequently in patients at high risk for Hepatitis C Hep C Antibody: 02/23/2021    Screening Current   Diabetes Screening 1-2 times per year if you're at risk for diabetes or have pre-diabetes Fasting glucose: 106 mg/dL   A1C: 5 7 %    Screening Current   Cholesterol Screening Once every 5 years if you don't have a lipid disorder  May order more often based on risk factors  Lipid panel: 02/23/2021    Screening Not Indicated  History Lipid Disorder     Other Preventive Screenings Covered by Medicare:  1  Abdominal Aortic Aneurysm (AAA) Screening: covered once if your at risk  You're considered to be at risk if you have a family history of AAA  2  Lung Cancer Screening: covers low dose CT scan once per year if you meet all of the following conditions: (1) Age 50-69; (2) No signs or symptoms of lung cancer; (3) Current smoker or have quit smoking within the last 15 years; (4) You have a tobacco smoking history of at least 30 pack years (packs per day multiplied by number of years you smoked); (5) You get a written order from a healthcare provider  3  Glaucoma Screening: covered annually if you're considered high risk: (1) You have diabetes OR (2) Family history of glaucoma OR (3)  aged 48 and older OR (3)  American aged 72 and older  3  Osteoporosis Screening: covered every 2 years if you meet one of the following conditions: (1) You're estrogen deficient and at risk for osteoporosis based off medical history and other findings; (2) Have a vertebral abnormality; (3) On glucocorticoid therapy for more than 3 months; (4) Have primary hyperparathyroidism; (5) On osteoporosis medications and need to assess response to drug therapy  · Last bone density test (DXA Scan): 05/15/2018  5  HIV Screening: covered annually if you're between the age of 12-76  Also covered annually if you are younger than 13 and older than 72 with risk factors for HIV infection   For pregnant patients, it is covered up to 3 times per pregnancy  Immunizations:  Immunization Recommendations   Influenza Vaccine Annual influenza vaccination during flu season is recommended for all persons aged >= 6 months who do not have contraindications   Pneumococcal Vaccine (Prevnar and Pneumovax)  * Prevnar = PCV13  * Pneumovax = PPSV23   Adults 25-60 years old: 1-3 doses may be recommended based on certain risk factors  Adults 72 years old: Prevnar (PCV13) vaccine recommended followed by Pneumovax (PPSV23) vaccine  If already received PPSV23 since turning 65, then PCV13 recommended at least one year after PPSV23 dose  Hepatitis B Vaccine 3 dose series if at intermediate or high risk (ex: diabetes, end stage renal disease, liver disease)   Tetanus (Td) Vaccine - COST NOT COVERED BY MEDICARE PART B Following completion of primary series, a booster dose should be given every 10 years to maintain immunity against tetanus  Td may also be given as tetanus wound prophylaxis  Tdap Vaccine - COST NOT COVERED BY MEDICARE PART B Recommended at least once for all adults  For pregnant patients, recommended with each pregnancy  Shingles Vaccine (Shingrix) - COST NOT COVERED BY MEDICARE PART B  2 shot series recommended in those aged 48 and above     Health Maintenance Due:      Topic Date Due    DXA SCAN  05/15/2020    Hepatitis C Screening  Completed     Immunizations Due:      Topic Date Due    COVID-19 Vaccine (1) Never done    Influenza Vaccine (1) 09/01/2021     Advance Directives   What are advance directives? Advance directives are legal documents that state your wishes and plans for medical care  These plans are made ahead of time in case you lose your ability to make decisions for yourself  Advance directives can apply to any medical decision, such as the treatments you want, and if you want to donate organs  What are the types of advance directives? There are many types of advance directives, and each state has rules about how to use them   You may choose a combination of any of the following:  · Living will: This is a written record of the treatment you want  You can also choose which treatments you do not want, which to limit, and which to stop at a certain time  This includes surgery, medicine, IV fluid, and tube feedings  · Durable power of  for healthcare [facility]): This is a written record that states who you want to make healthcare choices for you when you are unable to make them for yourself  This person, called a proxy, is usually a family member or a friend  You may choose more than 1 proxy  · Do not resuscitate (DNR) order:  A DNR order is used in case your heart stops beating or you stop breathing  It is a request not to have certain forms of treatment, such as CPR  A DNR order may be included in other types of advance directives  · Medical directive: This covers the care that you want if you are in a coma, near death, or unable to make decisions for yourself  You can list the treatments you want for each condition  Treatment may include pain medicine, surgery, blood transfusions, dialysis, IV or tube feedings, and a ventilator (breathing machine)  · Values history: This document has questions about your views, beliefs, and how you feel and think about life  This information can help others choose the care that you would choose  Why are advance directives important? An advance directive helps you control your care  Although spoken wishes may be used, it is better to have your wishes written down  Spoken wishes can be misunderstood, or not followed  Treatments may be given even if you do not want them  An advance directive may make it easier for your family to make difficult choices about your care  Urinary Incontinence   Urinary incontinence (UI)  is when you lose control of your bladder  UI develops because your bladder cannot store or empty urine properly   The 3 most common types of UI are stress incontinence, urge incontinence, or both  Medicines:   · May be given to help strengthen your bladder control  Report any side effects of medication to your healthcare provider  Do pelvic muscle exercises often:  Your pelvic muscles help you stop urinating  Squeeze these muscles tight for 5 seconds, then relax for 5 seconds  Gradually work up to squeezing for 10 seconds  Do 3 sets of 15 repetitions a day, or as directed  This will help strengthen your pelvic muscles and improve bladder control  Train your bladder:  Go to the bathroom at set times, such as every 2 hours, even if you do not feel the urge to go  You can also try to hold your urine when you feel the urge to go  For example, hold your urine for 5 minutes when you feel the urge to go  As that becomes easier, hold your urine for 10 minutes  Self-care:   · Keep a UI record  Write down how often you leak urine and how much you leak  Make a note of what you were doing when you leaked urine  · Drink liquids as directed  You may need to limit the amount of liquid you drink to help control your urine leakage  Do not drink any liquid right before you go to bed  Limit or do not have drinks that contain caffeine or alcohol  · Prevent constipation  Eat a variety of high-fiber foods  Good examples are high-fiber cereals, beans, vegetables, and whole-grain breads  Walking is the best way to trigger your intestines to have a bowel movement  · Exercise regularly and maintain a healthy weight  Weight loss and exercise will decrease pressure on your bladder and help you control your leakage  · Use a catheter as directed  to help empty your bladder  A catheter is a tiny, plastic tube that is put into your bladder to drain your urine  · Go to behavior therapy as directed  Behavior therapy may be used to help you learn to control your urge to urinate      Weight Management   Why it is important to manage your weight:  Being overweight increases your risk of health conditions such as heart disease, high blood pressure, type 2 diabetes, and certain types of cancer  It can also increase your risk for osteoarthritis, sleep apnea, and other respiratory problems  Aim for a slow, steady weight loss  Even a small amount of weight loss can lower your risk of health problems  How to lose weight safely:  A safe and healthy way to lose weight is to eat fewer calories and get regular exercise  You can lose up about 1 pound a week by decreasing the number of calories you eat by 500 calories each day  Healthy meal plan for weight management:  A healthy meal plan includes a variety of foods, contains fewer calories, and helps you stay healthy  A healthy meal plan includes the following:  · Eat whole-grain foods more often  A healthy meal plan should contain fiber  Fiber is the part of grains, fruits, and vegetables that is not broken down by your body  Whole-grain foods are healthy and provide extra fiber in your diet  Some examples of whole-grain foods are whole-wheat breads and pastas, oatmeal, brown rice, and bulgur  · Eat a variety of vegetables every day  Include dark, leafy greens such as spinach, kale, kailyn greens, and mustard greens  Eat yellow and orange vegetables such as carrots, sweet potatoes, and winter squash  · Eat a variety of fruits every day  Choose fresh or canned fruit (canned in its own juice or light syrup) instead of juice  Fruit juice has very little or no fiber  · Eat low-fat dairy foods  Drink fat-free (skim) milk or 1% milk  Eat fat-free yogurt and low-fat cottage cheese  Try low-fat cheeses such as mozzarella and other reduced-fat cheeses  · Choose meat and other protein foods that are low in fat  Choose beans or other legumes such as split peas or lentils  Choose fish, skinless poultry (chicken or turkey), or lean cuts of red meat (beef or pork)  Before you cook meat or poultry, cut off any visible fat  · Use less fat and oil    Try baking foods instead of frying them  Add less fat, such as margarine, sour cream, regular salad dressing and mayonnaise to foods  Eat fewer high-fat foods  Some examples of high-fat foods include french fries, doughnuts, ice cream, and cakes  · Eat fewer sweets  Limit foods and drinks that are high in sugar  This includes candy, cookies, regular soda, and sweetened drinks  Exercise:  Exercise at least 30 minutes per day on most days of the week  Some examples of exercise include walking, biking, dancing, and swimming  You can also fit in more physical activity by taking the stairs instead of the elevator or parking farther away from stores  Ask your healthcare provider about the best exercise plan for you  © Copyright [employer] 2018 Information is for End User's use only and may not be sold, redistributed or otherwise used for commercial purposes   All illustrations and images included in CareNotes® are the copyrighted property of [employer]  or [address] [place] [address]

## 2021-08-23 NOTE — PROGRESS NOTES
Assessment and Plan:     Problem List Items Addressed This Visit     None           Preventive health issues were discussed with patient, and age appropriate screening tests were ordered as noted in patient's After Visit Summary  Personalized health advice and appropriate referrals for health education or preventive services given if needed, as noted in patient's After Visit Summary       History of Present Illness:     Patient presents for Medicare Annual Wellness visit    Patient Care Team:  Jett Junior DO as PCP - General (Internal Medicine)     Problem List:     Patient Active Problem List   Diagnosis    Depression    Prediabetes    Morbid obesity with BMI of 45 0-49 9, adult ([address] )    Borderline hyperlipidemia    Osteopenia of multiple sites    Vitamin D deficiency    CASEY (obstructive sleep apnea)    Malignant neoplasm of right breast in female, estrogen receptor negative ([facility]    Other irritable bowel syndrome    Thrombocytopenia ([address] )      Past Medical and Surgical History:     Past Medical History:   Diagnosis Date    Benign neoplasm of skin     Breast cancer ([facility]     Bursitis     Right hip    Diverticulitis     Hypercholesterolemia     IBS (irritable bowel syndrome)     Lyme disease     Morbid obesity ([address] )     Partial small bowel obstruction ([facility] )     Prediabetes     Sleep apnea 11/01/2018    Vitamin D deficiency      Past Surgical History:   Procedure Laterality Date    BREAST LUMPECTOMY Right     In spring of 2020 as per pt     COLECTOMY      COLONOSCOPY      COMPLETE  2006, 2012    HERNIA REPAIR        Family History:     Family History   Problem Relation Age of Onset    No Known Problems Mother     Cancer Father         Gallbladder    Hypertension Father     Hypertension Son     Hypotension Son     Breast cancer Neg Hx     Colon cancer Neg Hx     Ovarian cancer Neg Hx     Uterine cancer Neg Hx     Cervical cancer Neg Hx       Social History:     Social History Socioeconomic History    Marital status: /Civil Union     Spouse name: None    Number of children: None    Years of education: None    Highest education level: None   Occupational History    None   Tobacco Use    Smoking status: Never Smoker    Smokeless tobacco: Never Used   Vaping Use    Vaping Use: Never used   Substance and Sexual Activity    Alcohol use: No    Drug use: No    Sexual activity: Not Currently     Birth control/protection: Post-menopausal   Other Topics Concern    None   Social History Narrative    ADVANCE DIRECTIVE DISCUSSED WITH PATIENT    NO ADVANCE DIRECTIVES     Social Determinants of Health     Financial Resource Strain:     Difficulty of Paying Living Expenses:    Food Insecurity:     Worried About Running Out of Food in the Last Year:     Ran Out of Food in the Last Year:    Transportation Needs:     Lack of Transportation (Medical):      Lack of Transportation (Non-Medical):    Physical Activity: Inactive    Days of Exercise per Week: 0 days    Minutes of Exercise per Session: 0 min   Stress: No Stress Concern Present    Feeling of Stress : Not at all   Social Connections:     Frequency of Communication with Friends and Family:     Frequency of Social Gatherings with Friends and Family:     Attends Hoah[employer]ism Services:     Active Member of Clubs or Organizations:     Attends Club or Organization Meetings:     Marital Status:    Intimate Partner Violence:     Fear of Current or Ex-Partner:     Emotionally Abused:     Physically Abused:     Sexually Abused:       Medications and Allergies:     Current Outpatient Medications   Medication Sig Dispense Refill    aspirin 81 MG tablet Take 81 mg by mouth daily      Cetirizine HCl (ZYRTEC ALLERGY) 10 MG CAPS daily       cholecalciferol (VITAMIN D3) 1,000 units tablet Take 1 tablet by mouth 2 (two) times a day      Diclofenac Sodium (VOLTAREN) 1 % Apply 2 g topically 4 (four) times a day 200 g 3    Docusate Sodium (Stool Softener) 100 MG capsule Take 100 mg by mouth 2 (two) times a day      escitalopram (LEXAPRO) 20 mg tablet Take 1 tablet (20 mg total) by mouth daily 90 tablet 3    hydrocortisone (PROCTOSOL HC) 2 5 % rectal cream Insert into the rectum daily as needed (Hemorrhoids) 30 g 5    Misc Natural Products (FIBER 7) POWD Take by mouth daily       Multiple Vitamins-Minerals (CENTRUM SILVER) tablet Take by mouth daily       nystatin-triamcinolone (MYCOLOG-II) ointment APPLY TO AFFECTED AREA TWICE A DAY 45 g 1    VITAMIN B COMPLEX-C PO Take by mouth daily       clotrimazole-betamethasone (LOTRISONE) 1-0 05 % cream Apply topically 2 (two) times a day for 14 days 45 g 1    cyclobenzaprine (FLEXERIL) 5 mg tablet Take 1 tablet (5 mg total) by mouth 2 (two) times a day for 14 days 28 tablet 0     No current facility-administered medications for this visit       No Known Allergies   Immunizations:     Immunization History   Administered Date(s) Administered    INFLUENZA 09/15/2009, 09/06/2012, 11/03/2013, 10/18/2014, 10/17/2015, 11/11/2018, 11/11/2019    Influenza Split High Dose Preservative Free IM 10/13/2017, 11/11/2018, 11/10/2019    Influenza, high dose seasonal 0 7 mL 10/18/2020    Influenza, seasonal, injectable 09/21/2014, 11/27/2016    Pneumococcal Conjugate 13-Valent 03/02/2016    Pneumococcal Polysaccharide PPV23 05/21/2013    Tdap 1945, 06/28/2017    Zoster 05/21/2013      Health Maintenance:         Topic Date Due    DXA SCAN  05/15/2020    Hepatitis C Screening  Completed         Topic Date Due    COVID-19 Vaccine (1) Never done    Influenza Vaccine (1) 09/01/2021      Medicare Health Risk Assessment:     /82 (BP Location: Left arm, Patient Position: Sitting, Cuff Size: Large)   Pulse 68   Temp (!) 96 7 °F (35 9 °C) (Tympanic)   Resp 14   Ht 4' 11" (1 499 m)   Wt 106 kg (234 lb 3 2 oz)   SpO2 94%   BMI 47 30 kg/m²      iY is here for her Subsequent Wellness visit  Health Risk Assessment:   Patient rates overall health as very good  Patient feels that their physical health rating is same  Patient is satisfied with their life  Eyesight was rated as same  Hearing was rated as slightly worse  Patient feels that their emotional and mental health rating is same  Patients states they are never, rarely angry  Patient states they are sometimes unusually tired/fatigued  Pain experienced in the last 7 days has been some  Patient's pain rating has been 7/10  Patient states that she has experienced no weight loss or gain in last 6 months  Fall Risk Screening: In the past year, patient has experienced: no history of falling in past year      Home Safety:  Patient does not have trouble with stairs inside or outside of their home  Patient has working smoke alarms and has working carbon monoxide detector  Home safety hazards include: none  Nutrition:   Current diet is Regular  Medications:   Patient is currently taking over-the-counter supplements  OTC medications include: see medication list  Patient is able to manage medications  Activities of Daily Living (ADLs)/Instrumental Activities of Daily Living (IADLs):   Walk and transfer into and out of bed and chair?: Yes  Dress and groom yourself?: Yes    Bathe or shower yourself?: Yes    Feed yourself?  Yes  Do your laundry/housekeeping?: Yes  Manage your money, pay your bills and track your expenses?: Yes  Make your own meals?: Yes    Do your own shopping?: Yes    Previous Hospitalizations:   Any hospitalizations or ED visits within the last 12 months?: No      Advance Care Planning:   Living will: Yes    Advanced directive: Yes      PREVENTIVE SCREENINGS      Cardiovascular Screening:    General: Screening Not Indicated and History Lipid Disorder      Diabetes Screening:     General: Screening Current      Breast Cancer Screening:     General: History Breast Cancer      Cervical Cancer Screening: General: Screening Not Indicated      Lung Cancer Screening:     General: Screening Not Indicated      Hepatitis C Screening:    General: Screening Current      OCTAVIO Yap

## 2021-08-23 NOTE — PROGRESS NOTES
INTERNAL MEDICINE FOLLOW-UP OFFICE VISIT  [facility]    NAME: Yi Foy  AGE: 68 y o  SEX: female    DATE OF ENCOUNTER: 8/24/2021   Assessment and Plan:   Patient will leave UA sample today  Most likely this will be negative because patient started left over keflex she had at home  Will treat for symptomatic UTI symptoms with macrobid  Problem List Items Addressed This Visit     None      Visit Diagnoses     UTI symptoms    -  Primary    Relevant Medications    nitrofurantoin (MACROBID) 100 mg capsule    Other Relevant Orders    UA w Reflex to Microscopic w Reflex to Culture -Lab Collect (Completed)          No follow-ups on file  Counseling:     · Medication Side Effects - Adverse side effects of medications were reviewed with the patient/guardian today: Yes  · Counseling was given regarding: Prognosis, Risks and benefits of tx options, Intructions for management, Patient and family education, Importance of tx compliance, Risk factor reductions and Impressions  · Barriers to treatment include: No identified barriers      Chief Complaint:     No chief complaint on file  History of Present Illness:     Urinary Tract Infection   This is a new problem  The current episode started in the past 7 days  The problem occurs every urination  The problem has been waxing and waning  The quality of the pain is described as burning  The pain is mild  There has been no fever  She is not sexually active  There is no history of pyelonephritis  Associated symptoms include frequency, hematuria, hesitancy and urgency  Pertinent negatives include no chills, discharge, flank pain, nausea, possible pregnancy, sweats or vomiting  She has tried antibiotics (had keflex at home) for the symptoms  The treatment provided mild relief         The following portions of the patient's history were reviewed and updated as appropriate: allergies, current medications, past family history, past medical history, past social history, past surgical history and problem list      Review of Systems:     Review of Systems   Constitutional: Negative for chills  Gastrointestinal: Negative for nausea and vomiting  Genitourinary: Positive for frequency, hematuria, hesitancy and urgency  Negative for flank pain  Problem List:     Patient Active Problem List   Diagnosis    Depression    Prediabetes    Morbid obesity with BMI of 45 0-49 9, adult ([facility] [address] )    Borderline hyperlipidemia    Osteopenia of multiple sites    Vitamin D deficiency    CASEY (obstructive sleep apnea)    Malignant neoplasm of right breast in female, estrogen receptor negative (HCC)    Other irritable bowel syndrome    Thrombocytopenia (HCC)        Objective:     /82 (BP Location: Left arm, Patient Position: Sitting, Cuff Size: Large)   Pulse 68   Temp (!) 96 7 °F (35 9 °C) (Tympanic)   Resp 14   Ht 4' 11" (1 499 m)   Wt 106 kg (234 lb 3 2 oz)   SpO2 94%   BMI 47 30 kg/m²     Physical Exam  Vitals reviewed  Constitutional:       General: She is not in acute distress  Appearance: Normal appearance  She is not ill-appearing  HENT:      Head: Normocephalic and atraumatic  Cardiovascular:      Rate and Rhythm: Normal rate and regular rhythm  Heart sounds: Normal heart sounds, S1 normal and S2 normal    Pulmonary:      Effort: Pulmonary effort is normal  No accessory muscle usage  Breath sounds: Normal breath sounds  No wheezing  Abdominal:      General: Abdomen is flat  Bowel sounds are normal       Palpations: Abdomen is soft  Tenderness: There is abdominal tenderness in the suprapubic area  Musculoskeletal:        Back:    Skin:     General: Skin is warm and dry  Capillary Refill: Capillary refill takes less than 2 seconds  Findings: No rash  Neurological:      General: No focal deficit present        Mental Status: She is alert and oriented to person, place, and time       Motor: Motor function is intact  Psychiatric:         Attention and Perception: Attention and perception normal          Mood and Affect: Mood and affect normal          Speech: Speech normal          Behavior: Behavior normal  Behavior is cooperative  Thought Content: Thought content normal          Pertinent Laboratory/Diagnostic Studies:    Laboratory Results: I have personally reviewed the pertinent laboratory results/reports   Radiology/Other Diagnostic Testing Results: I have personally reviewed pertinent reports  Current Medications:     Current Outpatient Medications   Medication Sig Dispense Refill    aspirin 81 MG tablet Take 81 mg by mouth daily      Cetirizine HCl (ZYRTEC ALLERGY) 10 MG CAPS daily       cholecalciferol (VITAMIN D3) 1,000 units tablet Take 1 tablet by mouth 2 (two) times a day      Diclofenac Sodium (VOLTAREN) 1 % Apply 2 g topically 4 (four) times a day 200 g 3    Docusate Sodium (Stool Softener) 100 MG capsule Take 100 mg by mouth 2 (two) times a day      escitalopram (LEXAPRO) 20 mg tablet Take 1 tablet (20 mg total) by mouth daily 90 tablet 3    hydrocortisone (PROCTOSOL HC) 2 5 % rectal cream Insert into the rectum daily as needed (Hemorrhoids) 30 g 5    Misc Natural Products (FIBER 7) POWD Take by mouth daily       Multiple Vitamins-Minerals (CENTRUM SILVER) tablet Take by mouth daily       nystatin-triamcinolone (MYCOLOG-II) ointment APPLY TO AFFECTED AREA TWICE A DAY 45 g 1    VITAMIN B COMPLEX-C PO Take by mouth daily       clotrimazole-betamethasone (LOTRISONE) 1-0 05 % cream Apply topically 2 (two) times a day for 14 days 45 g 1    cyclobenzaprine (FLEXERIL) 5 mg tablet Take 1 tablet (5 mg total) by mouth 2 (two) times a day for 14 days 28 tablet 0    nitrofurantoin (MACROBID) 100 mg capsule Take 1 capsule (100 mg total) by mouth 2 (two) times a day for 5 days 10 capsule 0     No current facility-administered medications for this visit  Patient Instructions       Medicare Preventive Visit Patient Instructions  Thank you for completing your Welcome to Medicare Visit or Medicare Annual Wellness Visit today  Your next wellness visit will be due in one year (8/24/2022)  The screening/preventive services that you may require over the next 5-10 years are detailed below  Some tests may not apply to you based off risk factors and/or age  Screening tests ordered at today's visit but not completed yet may show as past due  Also, please note that scanned in results may not display below  Preventive Screenings:  Service Recommendations Previous Testing/Comments   Colorectal Cancer Screening  * Colonoscopy    * Fecal Occult Blood Test (FOBT)/Fecal Immunochemical Test (FIT)  * Fecal DNA/Cologuard Test  * Flexible Sigmoidoscopy Age: 54-65 years old   Colonoscopy: every 10 years (may be performed more frequently if at higher risk)  OR  FOBT/FIT: every 1 year  OR  Cologuard: every 3 years  OR  Sigmoidoscopy: every 5 years  Screening may be recommended earlier than age 48 if at higher risk for colorectal cancer  Also, an individualized decision between you and your healthcare provider will decide whether screening between the ages of 74-80 would be appropriate  Colonoscopy: 01/18/2017  FOBT/FIT: Not on file  Cologuard: Not on file  Sigmoidoscopy: Not on file          Breast Cancer Screening Age: 36 years old  Frequency: every 1-2 years  Not required if history of left and right mastectomy Mammogram: 05/21/2019    History Breast Cancer   Cervical Cancer Screening Between the ages of 21-29, pap smear recommended once every 3 years  Between the ages of 33-67, can perform pap smear with HPV co-testing every 5 years     Recommendations may differ for women with a history of total hysterectomy, cervical cancer, or abnormal pap smears in past  Pap Smear: 06/17/2021    Screening Not Indicated   Hepatitis C Screening Once for adults born between 1945 and 1965  More frequently in patients at high risk for Hepatitis C Hep C Antibody: 02/23/2021    Screening Current   Diabetes Screening 1-2 times per year if you're at risk for diabetes or have pre-diabetes Fasting glucose: 106 mg/dL   A1C: 5 7 %    Screening Current   Cholesterol Screening Once every 5 years if you don't have a lipid disorder  May order more often based on risk factors  Lipid panel: 02/23/2021    Screening Not Indicated  History Lipid Disorder     Other Preventive Screenings Covered by Medicare:  1  Abdominal Aortic Aneurysm (AAA) Screening: covered once if your at risk  You're considered to be at risk if you have a family history of AAA  2  Lung Cancer Screening: covers low dose CT scan once per year if you meet all of the following conditions: (1) Age 50-69; (2) No signs or symptoms of lung cancer; (3) Current smoker or have quit smoking within the last 15 years; (4) You have a tobacco smoking history of at least 30 pack years (packs per day multiplied by number of years you smoked); (5) You get a written order from a healthcare provider  3  Glaucoma Screening: covered annually if you're considered high risk: (1) You have diabetes OR (2) Family history of glaucoma OR (3)  aged 48 and older OR (3)  American aged 72 and older  3  Osteoporosis Screening: covered every 2 years if you meet one of the following conditions: (1) You're estrogen deficient and at risk for osteoporosis based off medical history and other findings; (2) Have a vertebral abnormality; (3) On glucocorticoid therapy for more than 3 months; (4) Have primary hyperparathyroidism; (5) On osteoporosis medications and need to assess response to drug therapy  · Last bone density test (DXA Scan): 05/15/2018  5  HIV Screening: covered annually if you're between the age of 12-76  Also covered annually if you are younger than 13 and older than 72 with risk factors for HIV infection   For pregnant patients, it is covered up to 3 times per pregnancy  Immunizations:  Immunization Recommendations   Influenza Vaccine Annual influenza vaccination during flu season is recommended for all persons aged >= 6 months who do not have contraindications   Pneumococcal Vaccine (Prevnar and Pneumovax)  * Prevnar = PCV13  * Pneumovax = PPSV23   Adults 25-60 years old: 1-3 doses may be recommended based on certain risk factors  Adults 72 years old: Prevnar (PCV13) vaccine recommended followed by Pneumovax (PPSV23) vaccine  If already received PPSV23 since turning 65, then PCV13 recommended at least one year after PPSV23 dose  Hepatitis B Vaccine 3 dose series if at intermediate or high risk (ex: diabetes, end stage renal disease, liver disease)   Tetanus (Td) Vaccine - COST NOT COVERED BY MEDICARE PART B Following completion of primary series, a booster dose should be given every 10 years to maintain immunity against tetanus  Td may also be given as tetanus wound prophylaxis  Tdap Vaccine - COST NOT COVERED BY MEDICARE PART B Recommended at least once for all adults  For pregnant patients, recommended with each pregnancy  Shingles Vaccine (Shingrix) - COST NOT COVERED BY MEDICARE PART B  2 shot series recommended in those aged 48 and above     Health Maintenance Due:      Topic Date Due    DXA SCAN  05/15/2020    Hepatitis C Screening  Completed     Immunizations Due:      Topic Date Due    COVID-19 Vaccine (1) Never done    Influenza Vaccine (1) 09/01/2021     Advance Directives   What are advance directives? Advance directives are legal documents that state your wishes and plans for medical care  These plans are made ahead of time in case you lose your ability to make decisions for yourself  Advance directives can apply to any medical decision, such as the treatments you want, and if you want to donate organs  What are the types of advance directives?   There are many types of advance directives, and each state has rules about how to use them  You may choose a combination of any of the following:  · Living will: This is a written record of the treatment you want  You can also choose which treatments you do not want, which to limit, and which to stop at a certain time  This includes surgery, medicine, IV fluid, and tube feedings  · Durable power of  for healthcare [facility]): This is a written record that states who you want to make healthcare choices for you when you are unable to make them for yourself  This person, called a proxy, is usually a family member or a friend  You may choose more than 1 proxy  · Do not resuscitate (DNR) order:  A DNR order is used in case your heart stops beating or you stop breathing  It is a request not to have certain forms of treatment, such as CPR  A DNR order may be included in other types of advance directives  · Medical directive: This covers the care that you want if you are in a coma, near death, or unable to make decisions for yourself  You can list the treatments you want for each condition  Treatment may include pain medicine, surgery, blood transfusions, dialysis, IV or tube feedings, and a ventilator (breathing machine)  · Values history: This document has questions about your views, beliefs, and how you feel and think about life  This information can help others choose the care that you would choose  Why are advance directives important? An advance directive helps you control your care  Although spoken wishes may be used, it is better to have your wishes written down  Spoken wishes can be misunderstood, or not followed  Treatments may be given even if you do not want them  An advance directive may make it easier for your family to make difficult choices about your care  Urinary Incontinence   Urinary incontinence (UI)  is when you lose control of your bladder  UI develops because your bladder cannot store or empty urine properly   The 3 most common types of UI are stress incontinence, urge incontinence, or both  Medicines:   · May be given to help strengthen your bladder control  Report any side effects of medication to your healthcare provider  Do pelvic muscle exercises often:  Your pelvic muscles help you stop urinating  Squeeze these muscles tight for 5 seconds, then relax for 5 seconds  Gradually work up to squeezing for 10 seconds  Do 3 sets of 15 repetitions a day, or as directed  This will help strengthen your pelvic muscles and improve bladder control  Train your bladder:  Go to the bathroom at set times, such as every 2 hours, even if you do not feel the urge to go  You can also try to hold your urine when you feel the urge to go  For example, hold your urine for 5 minutes when you feel the urge to go  As that becomes easier, hold your urine for 10 minutes  Self-care:   · Keep a UI record  Write down how often you leak urine and how much you leak  Make a note of what you were doing when you leaked urine  · Drink liquids as directed  You may need to limit the amount of liquid you drink to help control your urine leakage  Do not drink any liquid right before you go to bed  Limit or do not have drinks that contain caffeine or alcohol  · Prevent constipation  Eat a variety of high-fiber foods  Good examples are high-fiber cereals, beans, vegetables, and whole-grain breads  Walking is the best way to trigger your intestines to have a bowel movement  · Exercise regularly and maintain a healthy weight  Weight loss and exercise will decrease pressure on your bladder and help you control your leakage  · Use a catheter as directed  to help empty your bladder  A catheter is a tiny, plastic tube that is put into your bladder to drain your urine  · Go to behavior therapy as directed  Behavior therapy may be used to help you learn to control your urge to urinate      Weight Management   Why it is important to manage your weight:  Being overweight increases your risk of health conditions such as heart disease, high blood pressure, type 2 diabetes, and certain types of cancer  It can also increase your risk for osteoarthritis, sleep apnea, and other respiratory problems  Aim for a slow, steady weight loss  Even a small amount of weight loss can lower your risk of health problems  How to lose weight safely:  A safe and healthy way to lose weight is to eat fewer calories and get regular exercise  You can lose up about 1 pound a week by decreasing the number of calories you eat by 500 calories each day  Healthy meal plan for weight management:  A healthy meal plan includes a variety of foods, contains fewer calories, and helps you stay healthy  A healthy meal plan includes the following:  · Eat whole-grain foods more often  A healthy meal plan should contain fiber  Fiber is the part of grains, fruits, and vegetables that is not broken down by your body  Whole-grain foods are healthy and provide extra fiber in your diet  Some examples of whole-grain foods are whole-wheat breads and pastas, oatmeal, brown rice, and bulgur  · Eat a variety of vegetables every day  Include dark, leafy greens such as spinach, kale, kailyn greens, and mustard greens  Eat yellow and orange vegetables such as carrots, sweet potatoes, and winter squash  · Eat a variety of fruits every day  Choose fresh or canned fruit (canned in its own juice or light syrup) instead of juice  Fruit juice has very little or no fiber  · Eat low-fat dairy foods  Drink fat-free (skim) milk or 1% milk  Eat fat-free yogurt and low-fat cottage cheese  Try low-fat cheeses such as mozzarella and other reduced-fat cheeses  · Choose meat and other protein foods that are low in fat  Choose beans or other legumes such as split peas or lentils  Choose fish, skinless poultry (chicken or turkey), or lean cuts of red meat (beef or pork)  Before you cook meat or poultry, cut off any visible fat  · Use less fat and oil    Try baking foods instead of frying them  Add less fat, such as margarine, sour cream, regular salad dressing and mayonnaise to foods  Eat fewer high-fat foods  Some examples of high-fat foods include french fries, doughnuts, ice cream, and cakes  · Eat fewer sweets  Limit foods and drinks that are high in sugar  This includes candy, cookies, regular soda, and sweetened drinks  Exercise:  Exercise at least 30 minutes per day on most days of the week  Some examples of exercise include walking, biking, dancing, and swimming  You can also fit in more physical activity by taking the stairs instead of the elevator or parking farther away from stores  Ask your healthcare provider about the best exercise plan for you  © Copyright [employer] 2018 Information is for End User's use only and may not be sold, redistributed or otherwise used for commercial purposes   All illustrations and images included in CareNotes® are the copyrighted property of [employer]  or [address] [place] [address]

## 2021-08-24 ENCOUNTER — TELEPHONE (OUTPATIENT)
Dept: INTERNAL MEDICINE CLINIC | Facility: CLINIC | Age: 76
End: 2021-08-24

## 2021-08-24 LAB
BILIRUB UR QL STRIP: NEGATIVE
CLARITY UR: CLEAR
COLOR UR: YELLOW
GLUCOSE UR STRIP-MCNC: NEGATIVE MG/DL
HGB UR QL STRIP.AUTO: NEGATIVE
KETONES UR STRIP-MCNC: NEGATIVE MG/DL
LEUKOCYTE ESTERASE UR QL STRIP: NEGATIVE
NITRITE UR QL STRIP: NEGATIVE
PH UR STRIP.AUTO: 7 [PH]
PROT UR STRIP-MCNC: NEGATIVE MG/DL
SP GR UR STRIP.AUTO: 1.02 (ref 1–1.03)
UROBILINOGEN UR QL STRIP.AUTO: 1 E.U./DL

## 2021-08-24 NOTE — TELEPHONE ENCOUNTER
----- Message from Terry Emanuel, [address] sent at 8/24/2021 12:58 PM EDT -----  Please let patient know UA was negative, however this can be due to the antibiotic she took  Please continue Macrobid until complete  Thanks

## 2022-02-15 DIAGNOSIS — F33.42 RECURRENT MAJOR DEPRESSION IN FULL REMISSION (HCC): ICD-10-CM

## 2022-02-15 RX ORDER — ESCITALOPRAM OXALATE 20 MG/1
TABLET ORAL
Qty: 90 TABLET | Refills: 3 | Status: SHIPPED | OUTPATIENT
Start: 2022-02-15 | End: 2022-03-31 | Stop reason: SDUPTHER

## 2022-02-24 ENCOUNTER — TELEPHONE (OUTPATIENT)
Dept: OBGYN CLINIC | Facility: CLINIC | Age: 77
End: 2022-02-24

## 2022-02-24 ENCOUNTER — APPOINTMENT (OUTPATIENT)
Dept: LAB | Facility: CLINIC | Age: 77
End: 2022-02-24
Payer: COMMERCIAL

## 2022-02-24 DIAGNOSIS — N30.00 ACUTE CYSTITIS WITHOUT HEMATURIA: Primary | ICD-10-CM

## 2022-02-24 DIAGNOSIS — N30.00 ACUTE CYSTITIS WITHOUT HEMATURIA: ICD-10-CM

## 2022-02-24 PROBLEM — Z17.1 CARCINOMA OF UPPER-INNER QUADRANT OF RIGHT BREAST IN FEMALE, ESTROGEN RECEPTOR NEGATIVE (HCC): Status: ACTIVE | Noted: 2020-06-08

## 2022-02-24 PROBLEM — K56.609 SMALL BOWEL OBSTRUCTION (HCC): Status: ACTIVE | Noted: 2021-05-06

## 2022-02-24 PROBLEM — C50.211 CARCINOMA OF UPPER-INNER QUADRANT OF RIGHT BREAST IN FEMALE, ESTROGEN RECEPTOR NEGATIVE (HCC): Status: ACTIVE | Noted: 2020-06-08

## 2022-02-24 LAB
BACTERIA UR QL AUTO: ABNORMAL /HPF
BILIRUB UR QL STRIP: ABNORMAL
CLARITY UR: ABNORMAL
COLOR UR: ABNORMAL
GLUCOSE UR STRIP-MCNC: ABNORMAL MG/DL
HGB UR QL STRIP.AUTO: ABNORMAL
KETONES UR STRIP-MCNC: ABNORMAL MG/DL
LEUKOCYTE ESTERASE UR QL STRIP: ABNORMAL
NITRITE UR QL STRIP: ABNORMAL
NON-SQ EPI CELLS URNS QL MICRO: ABNORMAL /HPF
PH UR STRIP.AUTO: ABNORMAL [PH]
PROT UR STRIP-MCNC: ABNORMAL MG/DL
RBC #/AREA URNS AUTO: ABNORMAL /HPF
SP GR UR STRIP.AUTO: 1.02 (ref 1–1.03)
UROBILINOGEN UR QL STRIP.AUTO: ABNORMAL E.U./DL
WBC #/AREA URNS AUTO: ABNORMAL /HPF

## 2022-02-24 PROCEDURE — 81001 URINALYSIS AUTO W/SCOPE: CPT

## 2022-02-24 PROCEDURE — 87086 URINE CULTURE/COLONY COUNT: CPT

## 2022-02-24 PROCEDURE — 87077 CULTURE AEROBIC IDENTIFY: CPT

## 2022-02-24 PROCEDURE — 87186 SC STD MICRODIL/AGAR DIL: CPT

## 2022-02-24 RX ORDER — SULFAMETHOXAZOLE AND TRIMETHOPRIM 800; 160 MG/1; MG/1
1 TABLET ORAL EVERY 12 HOURS SCHEDULED
Qty: 6 TABLET | Refills: 0 | Status: SHIPPED | OUTPATIENT
Start: 2022-02-24 | End: 2022-02-27

## 2022-02-24 NOTE — TELEPHONE ENCOUNTER
Yes please follow UTI protocol  If she continues to see blood she will need an endometrial biopsy and pelvic ultrasound   Thanks

## 2022-02-24 NOTE — TELEPHONE ENCOUNTER
Marco Dacosta,     This pt thinks she has UTI- having burning with urination and frequency  Also noticed drop of blood when wiping, happened once  Has been taking 2 kinds of AZO, one was a max strength  Last time she took either was this morning  Would you like her to go for a UC and UA? Please advise to bin thank you

## 2022-02-25 NOTE — TELEPHONE ENCOUNTER
Paperwork filled out and faxed to [facility] for the patients escitalopram (LEXAPRO) 20 mg tablets   Will check status in a few days just in case an appeal or more information will be needed

## 2022-02-26 LAB
BACTERIA UR CULT: ABNORMAL

## 2022-02-28 ENCOUNTER — TELEPHONE (OUTPATIENT)
Dept: OBGYN CLINIC | Facility: CLINIC | Age: 77
End: 2022-02-28

## 2022-02-28 DIAGNOSIS — N39.0 CHRONIC UTI: ICD-10-CM

## 2022-02-28 DIAGNOSIS — R30.0 BURNING WITH URINATION: Primary | ICD-10-CM

## 2022-02-28 NOTE — TELEPHONE ENCOUNTER
I would suggest another urine culture first  If she has chronic urinary issues I would also recommend a Urology referral  Thanks

## 2022-02-28 NOTE — TELEPHONE ENCOUNTER
Pt has had more than 3 uti testing in the past year per chart  Pt with chronic uti  referral placed for urology  Order placed for urine culture  Pt informed order and referral  Pt sx burning in urethra  Pt agreed to plan of action

## 2022-03-01 ENCOUNTER — APPOINTMENT (OUTPATIENT)
Dept: LAB | Facility: CLINIC | Age: 77
End: 2022-03-01
Payer: COMMERCIAL

## 2022-03-01 ENCOUNTER — TELEPHONE (OUTPATIENT)
Dept: INTERNAL MEDICINE CLINIC | Facility: CLINIC | Age: 77
End: 2022-03-01

## 2022-03-01 DIAGNOSIS — R30.0 BURNING WITH URINATION: ICD-10-CM

## 2022-03-01 DIAGNOSIS — E78.5 BORDERLINE HYPERLIPIDEMIA: ICD-10-CM

## 2022-03-01 DIAGNOSIS — R73.03 PREDIABETES: Primary | ICD-10-CM

## 2022-03-01 PROCEDURE — 87086 URINE CULTURE/COLONY COUNT: CPT

## 2022-03-01 NOTE — TELEPHONE ENCOUNTER
SEEING   BRENNAN  END  OF  MARCH  WANTS  TO  KNOW  IF  SHE  CAN  HAVE  SOME  LABS  DONE  BEFORE  HE   APPT

## 2022-03-02 LAB — BACTERIA UR CULT: NORMAL

## 2022-03-03 ENCOUNTER — TELEPHONE (OUTPATIENT)
Dept: OBGYN CLINIC | Facility: CLINIC | Age: 77
End: 2022-03-03

## 2022-03-03 DIAGNOSIS — N39.0 CHRONIC UTI: Primary | ICD-10-CM

## 2022-03-03 NOTE — TELEPHONE ENCOUNTER
Called pt- pt states "she feels like she is not emptying her bladder completely " denies pain, or frequency  States she has had urgency for awhile  States "she took bactrim as directed for 3 days, but feels like she needs more " Routed to A   Kane Gaines

## 2022-03-03 NOTE — TELEPHONE ENCOUNTER
----- Message from Shy Walker [place] sent at 3/3/2022  5:14 PM EST -----  Pls advise her urine culture was contaminated but it did not show a true infection  Is she feeling better?

## 2022-03-07 NOTE — TELEPHONE ENCOUNTER
Please advise patient I am recommending a urology consult  We have discussed this in previous visits but she has declined

## 2022-03-07 NOTE — TELEPHONE ENCOUNTER
Spoke to pt and reviewed again Olesya would suggest urology consult  Referral is in her chart already  She is aware they will reach out to her

## 2022-03-23 ENCOUNTER — APPOINTMENT (OUTPATIENT)
Dept: LAB | Facility: CLINIC | Age: 77
End: 2022-03-23
Payer: COMMERCIAL

## 2022-03-23 DIAGNOSIS — R73.03 PREDIABETES: ICD-10-CM

## 2022-03-23 DIAGNOSIS — E78.5 BORDERLINE HYPERLIPIDEMIA: ICD-10-CM

## 2022-03-23 LAB
ALBUMIN SERPL BCP-MCNC: 3.6 G/DL (ref 3.5–5)
ALP SERPL-CCNC: 57 U/L (ref 46–116)
ALT SERPL W P-5'-P-CCNC: 29 U/L (ref 12–78)
ANION GAP SERPL CALCULATED.3IONS-SCNC: 3 MMOL/L (ref 4–13)
AST SERPL W P-5'-P-CCNC: 32 U/L (ref 5–45)
BILIRUB SERPL-MCNC: 0.41 MG/DL (ref 0.2–1)
BUN SERPL-MCNC: 23 MG/DL (ref 5–25)
CALCIUM SERPL-MCNC: 9.2 MG/DL (ref 8.3–10.1)
CHLORIDE SERPL-SCNC: 109 MMOL/L (ref 100–108)
CHOLEST SERPL-MCNC: 187 MG/DL
CO2 SERPL-SCNC: 29 MMOL/L (ref 21–32)
CREAT SERPL-MCNC: 0.81 MG/DL (ref 0.6–1.3)
ERYTHROCYTE [DISTWIDTH] IN BLOOD BY AUTOMATED COUNT: 13.5 % (ref 11.6–15.1)
EST. AVERAGE GLUCOSE BLD GHB EST-MCNC: 114 MG/DL
GFR SERPL CREATININE-BSD FRML MDRD: 70 ML/MIN/1.73SQ M
GLUCOSE P FAST SERPL-MCNC: 117 MG/DL (ref 65–99)
HBA1C MFR BLD: 5.6 %
HCT VFR BLD AUTO: 41.4 % (ref 34.8–46.1)
HDLC SERPL-MCNC: 52 MG/DL
HGB BLD-MCNC: 14.2 G/DL (ref 11.5–15.4)
LDLC SERPL CALC-MCNC: 105 MG/DL (ref 0–100)
MCH RBC QN AUTO: 30.7 PG (ref 26.8–34.3)
MCHC RBC AUTO-ENTMCNC: 34.3 G/DL (ref 31.4–37.4)
MCV RBC AUTO: 90 FL (ref 82–98)
PLATELET # BLD AUTO: 135 THOUSANDS/UL (ref 149–390)
PMV BLD AUTO: 11.5 FL (ref 8.9–12.7)
POTASSIUM SERPL-SCNC: 4.1 MMOL/L (ref 3.5–5.3)
PROT SERPL-MCNC: 6.9 G/DL (ref 6.4–8.2)
RBC # BLD AUTO: 4.62 MILLION/UL (ref 3.81–5.12)
SODIUM SERPL-SCNC: 141 MMOL/L (ref 136–145)
TRIGL SERPL-MCNC: 152 MG/DL
WBC # BLD AUTO: 5.84 THOUSAND/UL (ref 4.31–10.16)

## 2022-03-23 PROCEDURE — 85027 COMPLETE CBC AUTOMATED: CPT

## 2022-03-23 PROCEDURE — 36415 COLL VENOUS BLD VENIPUNCTURE: CPT

## 2022-03-23 PROCEDURE — 80061 LIPID PANEL: CPT

## 2022-03-23 PROCEDURE — 80053 COMPREHEN METABOLIC PANEL: CPT

## 2022-03-23 PROCEDURE — 83036 HEMOGLOBIN GLYCOSYLATED A1C: CPT

## 2022-03-25 ENCOUNTER — RA CDI HCC (OUTPATIENT)
Dept: OTHER | Facility: HOSPITAL | Age: 77
End: 2022-03-25

## 2022-03-25 NOTE — PROGRESS NOTES
Bhavik [facility]  coding opportunities       Chart reviewed, no opportunity found:   [address]        Patients Insurance     Medicare Insurance: [employer]

## 2022-03-31 ENCOUNTER — OFFICE VISIT (OUTPATIENT)
Dept: INTERNAL MEDICINE CLINIC | Facility: CLINIC | Age: 77
End: 2022-03-31
Payer: COMMERCIAL

## 2022-03-31 VITALS
DIASTOLIC BLOOD PRESSURE: 82 MMHG | BODY MASS INDEX: 51.01 KG/M2 | OXYGEN SATURATION: 96 % | TEMPERATURE: 97.9 F | RESPIRATION RATE: 20 BRPM | WEIGHT: 243 LBS | SYSTOLIC BLOOD PRESSURE: 126 MMHG | HEIGHT: 58 IN | HEART RATE: 68 BPM

## 2022-03-31 DIAGNOSIS — F33.42 RECURRENT MAJOR DEPRESSION IN FULL REMISSION (HCC): Primary | ICD-10-CM

## 2022-03-31 DIAGNOSIS — Z00.00 MEDICARE ANNUAL WELLNESS VISIT, SUBSEQUENT: ICD-10-CM

## 2022-03-31 DIAGNOSIS — M19.049 HAND ARTHRITIS: ICD-10-CM

## 2022-03-31 DIAGNOSIS — R73.03 PREDIABETES: Chronic | ICD-10-CM

## 2022-03-31 DIAGNOSIS — E66.01 MORBID OBESITY WITH BMI OF 50.0-59.9, ADULT (HCC): ICD-10-CM

## 2022-03-31 DIAGNOSIS — D69.6 THROMBOCYTOPENIA (HCC): ICD-10-CM

## 2022-03-31 DIAGNOSIS — E78.5 BORDERLINE HYPERLIPIDEMIA: ICD-10-CM

## 2022-03-31 PROBLEM — Z85.3 HISTORY OF BREAST CANCER: Chronic | Status: ACTIVE | Noted: 2022-03-31

## 2022-03-31 PROBLEM — Z17.1 MALIGNANT NEOPLASM OF RIGHT BREAST IN FEMALE, ESTROGEN RECEPTOR NEGATIVE (HCC): Status: RESOLVED | Noted: 2020-06-25 | Resolved: 2022-03-31

## 2022-03-31 PROBLEM — C50.211 CARCINOMA OF UPPER-INNER QUADRANT OF RIGHT BREAST IN FEMALE, ESTROGEN RECEPTOR NEGATIVE (HCC): Status: RESOLVED | Noted: 2020-06-08 | Resolved: 2022-03-31

## 2022-03-31 PROBLEM — K56.609 SMALL BOWEL OBSTRUCTION (HCC): Status: RESOLVED | Noted: 2021-05-06 | Resolved: 2022-03-31

## 2022-03-31 PROBLEM — C50.911 MALIGNANT NEOPLASM OF RIGHT BREAST IN FEMALE, ESTROGEN RECEPTOR NEGATIVE (HCC): Status: RESOLVED | Noted: 2020-06-25 | Resolved: 2022-03-31

## 2022-03-31 PROBLEM — Z17.1 CARCINOMA OF UPPER-INNER QUADRANT OF RIGHT BREAST IN FEMALE, ESTROGEN RECEPTOR NEGATIVE (HCC): Status: RESOLVED | Noted: 2020-06-08 | Resolved: 2022-03-31

## 2022-03-31 PROCEDURE — 1160F RVW MEDS BY RX/DR IN RCRD: CPT | Performed by: INTERNAL MEDICINE

## 2022-03-31 PROCEDURE — 99214 OFFICE O/P EST MOD 30 MIN: CPT | Performed by: INTERNAL MEDICINE

## 2022-03-31 PROCEDURE — 3725F SCREEN DEPRESSION PERFORMED: CPT | Performed by: INTERNAL MEDICINE

## 2022-03-31 PROCEDURE — 1036F TOBACCO NON-USER: CPT | Performed by: INTERNAL MEDICINE

## 2022-03-31 PROCEDURE — 3288F FALL RISK ASSESSMENT DOCD: CPT | Performed by: INTERNAL MEDICINE

## 2022-03-31 PROCEDURE — G0439 PPPS, SUBSEQ VISIT: HCPCS | Performed by: INTERNAL MEDICINE

## 2022-03-31 PROCEDURE — 1170F FXNL STATUS ASSESSED: CPT | Performed by: INTERNAL MEDICINE

## 2022-03-31 PROCEDURE — 1125F AMNT PAIN NOTED PAIN PRSNT: CPT | Performed by: INTERNAL MEDICINE

## 2022-03-31 PROCEDURE — 1003F LEVEL OF ACTIVITY ASSESS: CPT | Performed by: INTERNAL MEDICINE

## 2022-03-31 RX ORDER — ESCITALOPRAM OXALATE 20 MG/1
20 TABLET ORAL DAILY
Qty: 90 TABLET | Refills: 3 | Status: SHIPPED | OUTPATIENT
Start: 2022-03-31

## 2022-03-31 NOTE — PROGRESS NOTES
Assessment and Plan:     1  Medicare annual wellness visit, subsequent    2  Morbid obesity with BMI of 50 0-59 9, adult ([facility]ly [facility])    BMI Counseling: Body mass index is 50 35 kg/m²  The BMI is above normal  Nutrition recommendations include encouraging healthy choices of fruits and vegetables, limiting drinks that contain sugar, moderation in carbohydrate intake and increasing intake of lean protein  Exercise recommendations include exercising 3-5 times per week  Rationale for BMI follow-up plan is due to patient being overweight or obese  Preventive health issues were discussed with patient, and age appropriate screening tests were ordered as noted in patient's After Visit Summary  Personalized health advice and appropriate referrals for health education or preventive services given if needed, as noted in patient's After Visit Summary       History of Present Illness:     Patient presents for Medicare Annual Wellness visit    Patient Care Team:  Johnie Funk DO as PCP - General (Internal Medicine)     Problem List:     Patient Active Problem List   Diagnosis    Depression    Prediabetes    Morbid obesity with BMI of 50 0-59 9, adult ([address] )    Borderline hyperlipidemia    Osteopenia of multiple sites    Vitamin D deficiency    CASEY (obstructive sleep apnea)    Other irritable bowel syndrome    Thrombocytopenia ([address] )    History of breast cancer      Past Medical and Surgical History:     Past Medical History:   Diagnosis Date    Benign neoplasm of skin     Breast cancer ([address] )     Bursitis     Right hip    Diverticulitis     Hypercholesterolemia     IBS (irritable bowel syndrome)     Lyme disease     Morbid obesity ([address] )     Partial small bowel obstruction ([address] )     Prediabetes     Sleep apnea 11/01/2018    Small bowel obstruction ([address] ) 5/6/2021    Vitamin D deficiency      Past Surgical History:   Procedure Laterality Date    BREAST LUMPECTOMY Right     In spring of 2020 as per pt     COLECTOMY      COLONOSCOPY      COMPLETE  2006, 2012    HERNIA REPAIR        Family History:     Family History   Problem Relation Age of Onset    No Known Problems Mother     Cancer Father         Gallbladder    Hypertension Father     Hypertension Son     Hypotension Son     Breast cancer Neg Hx     Colon cancer Neg Hx     Ovarian cancer Neg Hx     Uterine cancer Neg Hx     Cervical cancer Neg Hx       Social History:     Social History     Socioeconomic History    Marital status: /Civil Union     Spouse name: None    Number of children: None    Years of education: None    Highest education level: None   Occupational History    None   Tobacco Use    Smoking status: Never Smoker    Smokeless tobacco: Never Used   Vaping Use    Vaping Use: Never used   Substance and Sexual Activity    Alcohol use: No    Drug use: No    Sexual activity: Not Currently     Birth control/protection: Post-menopausal   Other Topics Concern    None   Social History Narrative    ADVANCE DIRECTIVE DISCUSSED WITH PATIENT    NO ADVANCE DIRECTIVES     Social Determinants of Health     Financial Resource Strain: Not on file   Food Insecurity: Not on file   Transportation Needs: Not on file   Physical Activity: Not on file   Stress: Not on file   Social Connections: Not on file   Intimate Partner Violence: Not on file   Housing Stability: Not on file      Medications and Allergies:     Current Outpatient Medications   Medication Sig Dispense Refill    aspirin 81 MG tablet Take 81 mg by mouth daily      Cetirizine HCl (ZYRTEC ALLERGY) 10 MG CAPS daily       cholecalciferol (VITAMIN D3) 1,000 units tablet Take 1 tablet by mouth 2 (two) times a day      Docusate Sodium (Stool Softener) 100 MG capsule Take 100 mg by mouth 2 (two) times a day      escitalopram (LEXAPRO) 20 mg tablet Take 1 tablet (20 mg total) by mouth daily 90 tablet 3    hydrocortisone (PROCTOSOL HC) 2 5 % rectal cream Insert into the rectum daily as needed (Hemorrhoids) 30 g 5    Misc Natural Products (FIBER 7) POWD Take by mouth daily       Multiple Vitamins-Minerals (CENTRUM SILVER) tablet Take by mouth daily       nystatin-triamcinolone (MYCOLOG-II) ointment APPLY TO AFFECTED AREA TWICE A DAY 45 g 1    VITAMIN B COMPLEX-C PO Take by mouth daily       Diclofenac Sodium (VOLTAREN) 1 % Apply 2 g topically 4 (four) times a day 200 g 3     No current facility-administered medications for this visit  No Known Allergies   Immunizations:     Immunization History   Administered Date(s) Administered    INFLUENZA 09/15/2009, 09/06/2012, 11/03/2013, 10/18/2014, 10/17/2015, 11/11/2018, 11/11/2019    Influenza Split High Dose Preservative Free IM 10/13/2017, 11/11/2018, 11/10/2019    Influenza, high dose seasonal 0 7 mL 10/18/2020    Influenza, seasonal, injectable 09/21/2014, 11/27/2016    Pneumococcal Conjugate 13-Valent 03/02/2016    Pneumococcal Polysaccharide PPV23 05/21/2013    Tdap 1945, 06/28/2017    Zoster 05/21/2013      Health Maintenance:         Topic Date Due    DXA SCAN  05/15/2020    Hepatitis C Screening  Completed         Topic Date Due    COVID-19 Vaccine (1) Never done    Influenza Vaccine (1) 09/01/2021      Medicare Health Risk Assessment:     /82 (BP Location: Left arm, Patient Position: Sitting, Cuff Size: Adult)   Pulse 68   Temp 97 9 °F (36 6 °C) (Tympanic)   Resp 20   Ht 4' 10 25" (1 48 m)   Wt 110 kg (243 lb)   SpO2 96%   BMI 50 35 kg/m²      Yi is here for her Subsequent Wellness visit  Last Medicare Wellness visit information reviewed, patient interviewed and updates made to the record today  Health Risk Assessment:   Patient rates overall health as good  Patient feels that their physical health rating is same  Patient is satisfied with their life  Eyesight was rated as same  Hearing was rated as slightly worse  Patient feels that their emotional and mental health rating is same  Patients states they are never, rarely angry  Patient states they are sometimes unusually tired/fatigued  Pain experienced in the last 7 days has been none  Patient states that she has experienced no weight loss or gain in last 6 months  Depression Screening:   PHQ-9 Score: 0      Fall Risk Screening: In the past year, patient has experienced: no history of falling in past year      Home Safety:  Patient does not have trouble with stairs inside or outside of their home  Patient has working smoke alarms and has working carbon monoxide detector  Home safety hazards include: none  Nutrition:   Current diet is Regular  Medications:   Patient is currently taking over-the-counter supplements  OTC medications include: see medication list  Patient is able to manage medications  Activities of Daily Living (ADLs)/Instrumental Activities of Daily Living (IADLs):   Walk and transfer into and out of bed and chair?: Yes  Dress and groom yourself?: Yes    Bathe or shower yourself?: Yes    Feed yourself?  Yes  Do your laundry/housekeeping?: Yes  Manage your money, pay your bills and track your expenses?: Yes  Make your own meals?: Yes    Do your own shopping?: Yes    Durable Medical Equipment Suppliers  none    Previous Hospitalizations:   Any hospitalizations or ED visits within the last 12 months?: No      Advance Care Planning:   Living will: Yes    Durable POA for healthcare: No    Advanced directive: Yes    Five wishes given: Yes      Cognitive Screening:   Provider or family/friend/caregiver concerned regarding cognition?: No    PREVENTIVE SCREENINGS      Cardiovascular Screening:    General: Screening Not Indicated and History Lipid Disorder      Diabetes Screening:     General: Screening Current      Colorectal Cancer Screening:     General: Screening Not Indicated      Breast Cancer Screening:     General: History Breast Cancer      Cervical Cancer Screening:    General: Screening Not Indicated Osteoporosis Screening:    General: Screening Current      Abdominal Aortic Aneurysm (AAA) Screening:        General: Screening Not Indicated      Lung Cancer Screening:     General: Screening Not Indicated      Hepatitis C Screening:    General: Screening Current    Screening, Brief Intervention, and Referral to Treatment (SBIRT)    Screening  Typical number of drinks in a day: 0  Typical number of drinks in a week: 0  Interpretation: Low risk drinking behavior  AUDIT-C Screenin) How often did you have a drink containing alcohol in the past year? never  2) How many drinks did you have on a typical day when you were drinking in the past year? 0  3) How often did you have 6 or more drinks on one occasion in the past year? never    AUDIT-C Score: 0  Interpretation: Score 0-2 (female): Negative screen for alcohol misuse    Single Item Drug Screening:  How often have you used an illegal drug (including marijuana) or a prescription medication for non-medical reasons in the past year? never    Single Item Drug Screen Score: 0  Interpretation: Negative screen for possible drug use disorder    Brief Intervention  Alcohol & drug use screenings were reviewed  No concerns regarding substance use disorder identified  Other Counseling Topics:   Car/seat belt/driving safety, skin self-exam, sunscreen and regular weightbearing exercise         [place] Gails, DO

## 2022-03-31 NOTE — PROGRESS NOTES
[facility]    NAME: Yi Foy  AGE: 68 y o  SEX: female  : 1945     DATE: 3/31/2022     Assessment and Plan:     1  Recurrent major depression in full remission ([address] )    Continue Lexapro  Depression is controlled  - escitalopram (LEXAPRO) 20 mg tablet; Take 1 tablet (20 mg total) by mouth daily  Dispense: 90 tablet; Refill: 3    2  Thrombocytopenia (HCC)    Stable and mild  Will monitor  No bleeding episodes  - CBC; Future    3  Borderline hyperlipidemia    Lab Results   Component Value Date    LDLCALC 105 (H) 2022      Watch diet  Recommend making heart healthy choices  Will monitor cholesterol     - Basic metabolic panel; Future  - Lipid panel; Future    4  Prediabetes    Recommend diet and lifestyle changes  Discussed programs that can help her  Most recent A1c was 5 6 % on 3/23/2022      - Hemoglobin A1C; Future    5  Hand arthritis  - Diclofenac Sodium (VOLTAREN) 1 %; Apply 2 g topically 4 (four) times a day  Dispense: 200 g; Refill: 3        Return in about 6 months (around 2022) for Follow-up  History of Present Illness:       Patient presents for follow-up  No big changes with her health  She continues to deal with arthritis pain and chronic, intermittent itchiness at times  Does get a couple UTIs throughout the year and her gynecologist recommended she follow-up with urologist      Review of Systems:     Review of Systems   Constitutional: Negative for activity change, appetite change and fatigue  Respiratory: Negative for apnea, cough, chest tightness, shortness of breath and wheezing  Cardiovascular: Negative for chest pain, palpitations and leg swelling  Gastrointestinal: Negative for abdominal distention, abdominal pain, blood in stool, constipation, diarrhea, nausea and vomiting  Genitourinary:        Incontinence   Musculoskeletal: Positive for arthralgias and back pain   Negative for gait problem and joint swelling  Skin:        Pruritus   Neurological: Negative for dizziness, weakness, light-headedness, numbness and headaches  Psychiatric/Behavioral: Negative for behavioral problems, confusion, hallucinations, sleep disturbance and suicidal ideas  The patient is not nervous/anxious  Objective:     /82 (BP Location: Left arm, Patient Position: Sitting, Cuff Size: Adult)   Pulse 68   Temp 97 9 °F (36 6 °C) (Tympanic)   Resp 20   Ht 4' 10 25" (1 48 m)   Wt 110 kg (243 lb)   SpO2 96%   BMI 50 35 kg/m²     Physical Exam  Vitals reviewed  Constitutional:       General: She is not in acute distress  Appearance: She is well-developed  She is obese  She is not diaphoretic  Neck:      Thyroid: No thyromegaly  Vascular: No JVD  Cardiovascular:      Rate and Rhythm: Normal rate and regular rhythm  Heart sounds: Normal heart sounds  No murmur heard  Pulmonary:      Effort: Pulmonary effort is normal  No respiratory distress  Breath sounds: Normal breath sounds  No wheezing or rales  Abdominal:      General: Bowel sounds are normal  There is no distension  Palpations: Abdomen is soft  Tenderness: There is no abdominal tenderness  Musculoskeletal:         General: Deformity (hand arthritis) present  Cervical back: Neck supple  Right lower leg: No edema  Left lower leg: No edema  Lymphadenopathy:      Cervical: No cervical adenopathy  Skin:     General: Skin is warm and dry  Neurological:      Mental Status: She is alert     Psychiatric:         Mood and Affect: Mood normal          Behavior: Behavior normal        Pete Atwood DO  MEDICAL [id number]

## 2022-03-31 NOTE — PATIENT INSTRUCTIONS
Obesity   AMBULATORY CARE:   Obesity  means your body mass index (BMI) is greater than 30  Your healthcare provider will use your height and weight to measure your BMI  The risks of obesity include  many health problems, including injuries or physical disability  · Diabetes (high blood sugar level)    · High blood pressure or high cholesterol    · Heart disease    · Stroke    · Gallbladder or liver disease    · Cancer of the colon, breast, prostate, liver, or kidney    · Sleep apnea    · Arthritis or gout    Screening  is done to check for health conditions before you have signs or symptoms  If you are 28to 79years old, your blood sugar level may be checked every 3 years for signs of prediabetes or diabetes  Your healthcare provider will check your blood pressure at each visit  High blood pressure can lead to a stroke or other problems  Your provider may check for signs of heart disease, cancer, or other health problems  Seek care immediately if:   · You have a severe headache, confusion, or difficulty speaking  · You have weakness on one side of your body  · You have chest pain, sweating, or shortness of breath  Call your doctor if:   · You have symptoms of gallbladder or liver disease, such as pain in your upper abdomen  · You have knee or hip pain and discomfort while walking  · You have symptoms of diabetes, such as intense hunger and thirst, and frequent urination  · You have symptoms of sleep apnea, such as snoring or daytime sleepiness  · You have questions or concerns about your condition or care  Treatment for obesity  focuses on helping you lose weight to improve your health  Even a small decrease in BMI can reduce the risk for many health problems  Your healthcare provider will help you set a weight-loss goal   · Lifestyle changes  are the first step in treating obesity  These include making healthy food choices and getting regular physical activity   Your healthcare provider may suggest a weight-loss program that involves coaching, education, and therapy  · Medicine  may help you lose weight when it is used with a healthy foods and physical activity  · Surgery  can help you lose weight if you are very obese and have other health problems  There are several types of weight-loss surgery  Ask your healthcare provider for more information  Tips for safe weight loss:   · Set small, realistic goals  An example of a small goal is to walk for 20 minutes 5 days a week  Anther goal is to lose 5% of your body weight  · Tell friends, family members, and coworkers about your goals  and ask for their support  Ask a friend to lose weight with you, or join a weight-loss support group  · Identify foods or triggers that may cause you to overeat , and find ways to avoid them  Remove tempting high-calorie foods from your home and workplace  Place a bowl of fresh fruit on your kitchen counter  If stress causes you to eat, then find other ways to cope with stress  A counselor or therapist may be able to help you  · Keep a diary to track what you eat and drink  Also write down how many minutes of physical activity you do each day  Weigh yourself once a week and record it in your diary  Eating changes: You will need to eat 500 to 1,000 fewer calories each day than you currently eat to lose 1 to 2 pounds a week  The following changes will help you cut calories:  · Eat smaller portions  Use small plates, no larger than 9 inches in diameter  Fill your plate half full of fruits and vegetables  Measure your food using measuring cups until you know what a serving size looks like  · Eat 3 meals and 1 or 2 snacks each day  Plan your meals in advance  Ashley Laughter and eat at home most of the time  Eat slowly  Do not skip meals  Skipping meals can lead to overeating later in the day  This can make it harder for you to lose weight   Talk with a dietitian to help you make a meal plan and schedule that is right for you  · Eat fruits and vegetables at every meal   They are low in calories and high in fiber, which makes you feel full  Do not add butter, margarine, or cream sauce to vegetables  Use herbs to season steamed vegetables  · Eat less fat and fewer fried foods  Eat more baked or grilled chicken and fish  These protein sources are lower in calories and fat than red meat  Limit fast food  Dress your salads with olive oil and vinegar instead of bottled dressing  · Limit the amount of sugar you eat  Do not drink sugary beverages  Limit alcohol  Activity changes:  Physical activity is good for your body in many ways  It helps you burn calories and build strong muscles  It decreases stress and depression, and improves your mood  It can also help you sleep better  Talk to your healthcare provider before you begin an exercise program   · Exercise for at least 30 minutes 5 days a week  Start slowly  Set aside time each day for physical activity that you enjoy and that is convenient for you  It is best to do both weight training and an activity that increases your heart rate, such as walking, bicycling, or swimming  · Find ways to be more active  Do yard work and housecleaning  Walk up the stairs instead of using elevators  Spend your leisure time going to events that require walking, such as outdoor festivals or fairs  This extra physical activity can help you lose weight and keep it off  Follow up with your doctor as directed: You may need to meet with a dietitian  Write down your questions so you remember to ask them during your visits  © Copyright [employer] 2022 Information is for End User's use only and may not be sold, redistributed or otherwise used for commercial purposes  All illustrations and images included in CareNotes® are the copyrighted property of A D A M [employer] Inc  or Dania Anguiano   The above information is an  only   It is not intended as medical advice for individual conditions or treatments  Talk to your doctor, nurse or pharmacist before following any medical regimen to see if it is safe and effective for you

## 2022-04-19 ENCOUNTER — APPOINTMENT (OUTPATIENT)
Dept: LAB | Facility: CLINIC | Age: 77
End: 2022-04-19
Payer: COMMERCIAL

## 2022-04-19 ENCOUNTER — TELEPHONE (OUTPATIENT)
Dept: OBGYN CLINIC | Facility: CLINIC | Age: 77
End: 2022-04-19

## 2022-04-19 DIAGNOSIS — R39.9 URINARY SYMPTOM OR SIGN: Primary | ICD-10-CM

## 2022-04-19 DIAGNOSIS — N39.0 URINARY TRACT INFECTION WITHOUT HEMATURIA, SITE UNSPECIFIED: Primary | ICD-10-CM

## 2022-04-19 DIAGNOSIS — N39.0 URINARY TRACT INFECTION WITHOUT HEMATURIA, SITE UNSPECIFIED: ICD-10-CM

## 2022-04-19 LAB
BACTERIA UR QL AUTO: ABNORMAL /HPF
BILIRUB UR QL STRIP: ABNORMAL
CLARITY UR: ABNORMAL
COLOR UR: ABNORMAL
GLUCOSE UR STRIP-MCNC: NEGATIVE MG/DL
HGB UR QL STRIP.AUTO: ABNORMAL
KETONES UR STRIP-MCNC: NEGATIVE MG/DL
LEUKOCYTE ESTERASE UR QL STRIP: ABNORMAL
NITRITE UR QL STRIP: POSITIVE
NON-SQ EPI CELLS URNS QL MICRO: ABNORMAL /HPF
PH UR STRIP.AUTO: 7 [PH]
PROT UR STRIP-MCNC: ABNORMAL MG/DL
RBC #/AREA URNS AUTO: ABNORMAL /HPF
SP GR UR STRIP.AUTO: 1.02 (ref 1–1.03)
UROBILINOGEN UR STRIP-ACNC: 2 MG/DL
WBC #/AREA URNS AUTO: ABNORMAL /HPF
WBC CLUMPS # UR AUTO: PRESENT /UL

## 2022-04-19 PROCEDURE — 87186 SC STD MICRODIL/AGAR DIL: CPT

## 2022-04-19 PROCEDURE — 87077 CULTURE AEROBIC IDENTIFY: CPT

## 2022-04-19 PROCEDURE — 87086 URINE CULTURE/COLONY COUNT: CPT

## 2022-04-19 PROCEDURE — 81001 URINALYSIS AUTO W/SCOPE: CPT

## 2022-04-19 RX ORDER — SULFAMETHOXAZOLE AND TRIMETHOPRIM 800; 160 MG/1; MG/1
1 TABLET ORAL EVERY 12 HOURS SCHEDULED
Qty: 6 TABLET | Refills: 0 | Status: SHIPPED | OUTPATIENT
Start: 2022-04-19 | End: 2022-04-22

## 2022-04-19 RX ORDER — NITROFURANTOIN 25; 75 MG/1; MG/1
100 CAPSULE ORAL 2 TIMES DAILY
Qty: 14 CAPSULE | Refills: 0 | Status: CANCELLED | OUTPATIENT
Start: 2022-04-19 | End: 2022-04-26

## 2022-04-19 NOTE — TELEPHONE ENCOUNTER
I will treat once I get her urine results back  She really needs to see Urology asap  For now please instruct her to use over-the-counter Uristat

## 2022-04-19 NOTE — TELEPHONE ENCOUNTER
Labs put in and scheduled for 10/10/2017. Spoke with patient. She verbalizes understanding.    Spoke to pt and she is at lab now  Relayed msg to pt from [facility] and no help she said  She has not set up appt with urologist cause she hasnt had time  Strongly suggested doing that  Told her I will let Olesya know she is at the lab giving sample now

## 2022-04-19 NOTE — TELEPHONE ENCOUNTER
Pt has not yet called got urologist referral  Due to   Situations at home  Began with treq, burning, and urgency    She will go for u/a with c&s todayand req rx  [place], pharm [facility] [address]  Order placed for macrobid, needs to be signed  If agree

## 2022-04-21 LAB — BACTERIA UR CULT: ABNORMAL

## 2022-04-25 ENCOUNTER — TELEPHONE (OUTPATIENT)
Dept: OBGYN CLINIC | Facility: CLINIC | Age: 77
End: 2022-04-25

## 2022-04-25 NOTE — TELEPHONE ENCOUNTER
Olesya,  Pt seeing urology tomorrow   She has e coli  Do you want to rx her or wait till she sees urology?  Thanks

## 2022-04-25 NOTE — PROGRESS NOTES
4/26/2022      Chief Complaint   Patient presents with    Urinary Tract Infection     Assessment and Plan    68 y o  female new patient     1  Recurrent UTIs  - 2 positive urine cultures within the past year growing E Coli  - urine dip today negative  PVR was slight incomplete bladder emptying today of 122 mL which potentially could be contributing to UTIs and incontinence issues  Will repeat PVR at next visit  Recommend double voiding and timed voiding every 2-3 hours  - Recommend cranberry supplementation, probiotic, proper hydration, and avoiding constipation to prevent UTIs  - patient does have history of breast cancer and is not good candidate for topical vaginal estrogen cream   - Standing urine culture orders placed  - consider Hiprex for ongoing UTIs  - ongoing recurrent UTIs despite above management consider daily low-dose prophylactic antibiotic  2  Urinary incontinence/nocturnal enuresis  - recommend restarting CPAP use for sleep apnea  - recommend nighttime fluid restriction, avoiding bladder irritants, and Kegel exercises  - follow-up in 3 months for symptom reassessment  History of Present Illness  Yi Foy is a 68 y o  female here for new patient evaluation of recurrent urinary tract infections  She has had a total of 2 positive urine cultures within the past year growing E coli  She states typically she gets 1 or 2 UTIs per year  She denies ever being hospitalized for urinary tract infection or any prior kidney infections  She typically will experience suprapubic pain and dysuria with a UTI which resolved with antibiotics  She does have chronic constipation issues and takes daily stool softener and fiber supplement  She also admits to chronic urinary incontinence and nocturnal enuresis  She denies any significant daytime symptoms  Denies any other urinary complaints  She does have history of CASEY however discontinue using her CPAP some time ago    She has 2 children and reports each were difficult vaginal births  She denies any family history of  malignancy  Denies any prior  surgical manipulation  CT abdomen and pelvis from May of 2021 was unremarkable for urologic findings other than parapelvic cysts on both sides, more on the left  Medical history includes obesity, osteopenia, obstructive sleep apnea, thrombocytopenia, IBS, history of breast cancer, prediabetes      Review of Systems   Constitutional: Negative for chills and fever  Respiratory: Negative for shortness of breath  Cardiovascular: Negative for chest pain  Gastrointestinal: Positive for constipation  Negative for abdominal pain  Genitourinary: Negative for difficulty urinating, dysuria, flank pain, frequency, hematuria, pelvic pain and urgency  Neurological: Negative for dizziness         Past Medical History  Past Medical History:   Diagnosis Date    Benign neoplasm of skin     [facility]     Bursitis     Right hip    Diverticulitis     Hypercholesterolemia     IBS (irritable bowel syndrome)     Lyme disease     Morbid [facility] )     Partial small bowel obstruction (HCC)     Prediabetes     Sleep apnea 11/01/2018    Small bowel obstruction ([facility] ) 5/6/2021    Vitamin D deficiency        Past Social History  Past Surgical History:   Procedure Laterality Date    BREAST LUMPECTOMY Right     In spring of 2020 as per pt     COLECTOMY      COLONOSCOPY      COMPLETE  2006, 2012    HERNIA REPAIR       Social History     Tobacco Use   Smoking Status Never Smoker   Smokeless Tobacco Never Used       Past Family History  Family History   Problem Relation Age of Onset    No Known Problems Mother     Cancer Father         Gallbladder    Hypertension Father     Hypertension Son     Hypotension Son     Breast cancer Neg Hx     Colon cancer Neg Hx     Ovarian cancer Neg Hx     Uterine cancer Neg Hx     Cervical cancer Neg Hx        Past Social history  Social History     Socioeconomic History    Marital status: /Civil Union     Spouse name: Not on file    Number of children: Not on file    Years of education: Not on file    Highest education level: Not on file   Occupational History    Not on file   Tobacco Use    Smoking status: Never Smoker    Smokeless tobacco: Never Used   Vaping Use    Vaping Use: Never used   Substance and Sexual Activity    Alcohol use: No    Drug use: No    Sexual activity: Not Currently     Birth control/protection: Post-menopausal   Other Topics Concern    Not on file   Social History Narrative    ADVANCE DIRECTIVE DISCUSSED WITH PATIENT    NO ADVANCE DIRECTIVES     Social Determinants of Health     Financial Resource Strain: Not on file   Food Insecurity: Not on file   Transportation Needs: Not on file   Physical Activity: Not on file   Stress: Not on file   Social Connections: Not on file   Intimate Partner Violence: Not on file   Housing Stability: Not on file       Current Medications  Current Outpatient Medications   Medication Sig Dispense Refill    aspirin 81 MG tablet Take 81 mg by mouth daily      Cetirizine HCl (ZYRTEC ALLERGY) 10 MG CAPS daily       cholecalciferol (VITAMIN D3) 1,000 units tablet Take 1 tablet by mouth 2 (two) times a day      Diclofenac Sodium (VOLTAREN) 1 % Apply 2 g topically 4 (four) times a day 200 g 3    Docusate Sodium (Stool Softener) 100 MG capsule Take 100 mg by mouth 2 (two) times a day      escitalopram (LEXAPRO) 20 mg tablet Take 1 tablet (20 mg total) by mouth daily 90 tablet 3    hydrocortisone (PROCTOSOL HC) 2 5 % rectal cream Insert into the rectum daily as needed (Hemorrhoids) 30 g 5    Misc Natural Products (FIBER 7) POWD Take by mouth daily       Multiple Vitamins-Minerals (CENTRUM SILVER) tablet Take by mouth daily       nystatin-triamcinolone (MYCOLOG-II) ointment APPLY TO AFFECTED AREA TWICE A DAY 45 g 1    VITAMIN B COMPLEX-C PO Take by mouth daily        No current facility-administered medications for this visit  Allergies  No Known Allergies      The following portions of the patient's history were reviewed and updated as appropriate: allergies, current medications, past medical history, past social history, past surgical history and problem list       Vitals  Vitals:    04/26/22 0936   BP: 122/70   BP Location: Left arm   Patient Position: Sitting   Cuff Size: Large   Pulse: 76   Resp: 16   SpO2: 98%   Weight: 107 kg (234 lb 12 8 oz)   Height: 4' 10" (1 473 m)           Physical Exam  Physical Exam  Constitutional:       Appearance: Normal appearance  She is obese  HENT:      Head: Normocephalic and atraumatic  Right Ear: External ear normal       Left Ear: External ear normal    Eyes:      General: No scleral icterus  Conjunctiva/sclera: Conjunctivae normal    Cardiovascular:      Pulses: Normal pulses  Pulmonary:      Effort: Pulmonary effort is normal    Musculoskeletal:         General: Normal range of motion  Cervical back: Normal range of motion  Neurological:      General: No focal deficit present  Mental Status: She is alert and oriented to person, place, and time  Psychiatric:         Mood and Affect: Mood normal          Behavior: Behavior normal          Thought Content:  Thought content normal          Judgment: Judgment normal            Results  Recent Results (from the past 1 hour(s))   POCT urine dip    Collection Time: 04/26/22  9:47 AM   Result Value Ref Range    LEUKOCYTE ESTERASE,UA -     NITRITE,UA -     SL AMB POCT UROBILINOGEN 0 2     POCT URINE PROTEIN -      PH,UA 5 0     BLOOD,UA -     SPECIFIC GRAVITY,UA 1,030     KETONES,UA -     BILIRUBIN,UA -     GLUCOSE, UA -      COLOR,UA yellow     CLARITY,UA clear    POCT Measure PVR    Collection Time: 04/26/22  9:48 AM   Result Value Ref Range    POST-VOID RESIDUAL VOLUME, ML  mL   ]  No results found for: PSA  Lab Results   Component Value Date    GLUCOSE 107 09/28/2015    CALCIUM 9 2 03/23/2022     09/28/2015    K 4 1 03/23/2022    CO2 29 03/23/2022     (H) 03/23/2022    BUN 23 03/23/2022    CREATININE 0 81 03/23/2022     Lab Results   Component Value Date    WBC 5 84 03/23/2022    HGB 14 2 03/23/2022    HCT 41 4 03/23/2022    MCV 90 03/23/2022     (L) 03/23/2022           Orders  Orders Placed This Encounter   Procedures    POCT Measure PVR    POCT urine dip       Romario Granda PA-C

## 2022-04-26 ENCOUNTER — OFFICE VISIT (OUTPATIENT)
Dept: UROLOGY | Facility: CLINIC | Age: 77
End: 2022-04-26
Payer: COMMERCIAL

## 2022-04-26 VITALS
HEART RATE: 76 BPM | OXYGEN SATURATION: 98 % | HEIGHT: 58 IN | BODY MASS INDEX: 49.29 KG/M2 | DIASTOLIC BLOOD PRESSURE: 70 MMHG | SYSTOLIC BLOOD PRESSURE: 122 MMHG | WEIGHT: 234.8 LBS | RESPIRATION RATE: 16 BRPM

## 2022-04-26 DIAGNOSIS — N39.0 URINARY TRACT INFECTION WITHOUT HEMATURIA, SITE UNSPECIFIED: Primary | ICD-10-CM

## 2022-04-26 DIAGNOSIS — N39.0 FREQUENT UTI: ICD-10-CM

## 2022-04-26 LAB
POST-VOID RESIDUAL VOLUME, ML POC: 122 ML
SL AMB  POCT GLUCOSE, UA: NORMAL
SL AMB LEUKOCYTE ESTERASE,UA: NORMAL
SL AMB POCT BILIRUBIN,UA: NORMAL
SL AMB POCT BLOOD,UA: NORMAL
SL AMB POCT CLARITY,UA: CLEAR
SL AMB POCT COLOR,UA: YELLOW
SL AMB POCT KETONES,UA: NORMAL
SL AMB POCT NITRITE,UA: NORMAL
SL AMB POCT PH,UA: 5
SL AMB POCT SPECIFIC GRAVITY,UA: 1030
SL AMB POCT URINE PROTEIN: NORMAL
SL AMB POCT UROBILINOGEN: 0.2

## 2022-04-26 PROCEDURE — 99203 OFFICE O/P NEW LOW 30 MIN: CPT | Performed by: PHYSICIAN ASSISTANT

## 2022-04-26 PROCEDURE — 81002 URINALYSIS NONAUTO W/O SCOPE: CPT | Performed by: PHYSICIAN ASSISTANT

## 2022-04-26 PROCEDURE — 1160F RVW MEDS BY RX/DR IN RCRD: CPT | Performed by: PHYSICIAN ASSISTANT

## 2022-04-26 PROCEDURE — 51798 US URINE CAPACITY MEASURE: CPT | Performed by: PHYSICIAN ASSISTANT

## 2022-04-26 PROCEDURE — 1036F TOBACCO NON-USER: CPT | Performed by: PHYSICIAN ASSISTANT

## 2022-05-12 ENCOUNTER — OFFICE VISIT (OUTPATIENT)
Dept: INTERNAL MEDICINE CLINIC | Facility: CLINIC | Age: 77
End: 2022-05-12
Payer: COMMERCIAL

## 2022-05-12 VITALS
TEMPERATURE: 98.6 F | HEART RATE: 78 BPM | SYSTOLIC BLOOD PRESSURE: 114 MMHG | DIASTOLIC BLOOD PRESSURE: 68 MMHG | BODY MASS INDEX: 48.28 KG/M2 | RESPIRATION RATE: 20 BRPM | OXYGEN SATURATION: 96 % | HEIGHT: 58 IN | WEIGHT: 230 LBS

## 2022-05-12 DIAGNOSIS — M54.16 LUMBAR RADICULOPATHY: Primary | ICD-10-CM

## 2022-05-12 PROCEDURE — 99213 OFFICE O/P EST LOW 20 MIN: CPT

## 2022-05-12 RX ORDER — GABAPENTIN 100 MG/1
100 CAPSULE ORAL
Qty: 30 CAPSULE | Refills: 1 | Status: SHIPPED | OUTPATIENT
Start: 2022-05-12 | End: 2022-05-17

## 2022-05-12 RX ORDER — ZINC GLUCONATE 50 MG
50 TABLET ORAL DAILY
COMMUNITY

## 2022-05-12 RX ORDER — PREDNISONE 10 MG/1
TABLET ORAL
Qty: 30 TABLET | Refills: 0 | Status: SHIPPED | OUTPATIENT
Start: 2022-05-12 | End: 2022-05-24

## 2022-05-12 NOTE — PATIENT INSTRUCTIONS
Prednisone as directed  Tylenol Arthritis 2 tablets every 8 hours  Lidocaine patches over the counter  Follow up with physical therapy  Ice/heat    Return if symptoms worsen    Lumbar Radiculopathy   WHAT YOU NEED TO KNOW:   What is lumbar radiculopathy? Lumbar radiculopathy is a painful condition that happens when a nerve in your lumbar spine (lower back) is pinched or irritated  Nerves control feeling and movement in your body  What causes lumbar radiculopathy? You may get a pinched nerve in your lumbar spine if you have disc damage  Discs are natural, spongy cushions between your vertebrae (back bones) that allow your spine to move  Your discs may move out of place and pinch the nerve in your spine  Your risk for a pinched nerve and lumbar radiculopathy increases if:  You smoke  You have diabetes, a spinal infection, or a growth in your spine  You are overweight  You are male  You are elderly  What are the signs and symptoms of lumbar radiculopathy? You may have any of the following:  Pain that moves from your lower back to your buttocks, groin, and the back of your leg  The pain is often felt below your knee  Your pain may worsen when you cough, sneeze, stand, or sit  Numbness, weakness, or tingling in your back or legs  How is lumbar radiculopathy diagnosed? Your healthcare provider will examine you and ask about your family history of back and leg pain  He may also test you for weakness, numbness, or tingling in your back, buttocks, and legs  Your healthcare provider may ask you to lie on your back and lift your leg to locate your pain  You may have any of the following:  Blood tests: You may need blood taken to give healthcare providers information about how your body is working  The blood may be taken from your hand, arm, or IV  Magnetic resonance imaging (MRI): An MRI machine is used to take a picture of your lower back   Your healthcare provider will use this picture to check for problems and changes in your back bones, nerves, and discs  You will need to lie still during this test  The MRI machine contains a very powerful magnet  Never enter the MRI room with any metal objects  This can cause serious injury  Tell your healthcare provider if you have any metal implants in your body  X-ray: An x-ray is a picture of your lower back  A lumbar x-ray may show signs of infection or other problems with your spine  An electromyography (EMG)  test measures the electrical activity of your muscles at rest and with movement  Computed tomography (CT) scan: A special x-ray machine uses a computer to take pictures of your lower back  It may be used to look at your bones, discs, and nerves  You may be given dye in your IV to help improve the pictures  Tell your healthcare provider if you are allergic to shellfish, or have other allergies or medical conditions  People who are allergic to iodine or shellfish (lobster, crab, or shrimp) may be allergic to some dyes  How is lumbar radiculopathy treated? Treatment of lumbar radiculopathy may reduce pain and swelling, and improve your ability to walk and do your normal activities  Ask your healthcare provider for more information about these and other treatments for lumbar radiculopathy:  Medicines:     NSAIDs , such as ibuprofen, help decrease swelling, pain, and fever  This medicine is available with or without a doctor's order  NSAIDs can cause stomach bleeding or kidney problems in certain people  If you take blood thinner medicine, always ask your healthcare provider if NSAIDs are safe for you  Always read the medicine label and follow directions  Muscle relaxers  help decrease pain and muscle spasms  Opioids: This is a strong medicine given to reduce severe pain  It is also called narcotic pain medicine  Take this medicine exactly as directed by your healthcare provider      Oral steroids: Steroids may be given to reduce swelling and pain     Steroid injections: Healthcare providers may give you steroid medicine through a needle (shot) into your lumbar spine  This may help decrease your nerve pain and swelling  You may need more than 1 injection if your symptoms do not improve after the first treatment  Physical therapy: Your healthcare provider may suggest physical therapy  Your physical therapist may teach you certain exercises to improve posture (the way you stand and sit), flexibility, and strength in your lower back  He may also teach you how to remain safely active and avoid further injury  Follow the exercise plan given to you by your physical therapist     Transcutaneous electrical nerve stimulation: This treatment, called TENS, stimulates your nerves and may decrease your pain  Wires are attached to pads  The pads are attached to your skin  The wires send a mild current through your nerves  Surgery: You may need surgery to relieve your pinched nerve if your condition has not improved within 4 to 6 weeks  You may also need it if you have lumbar radiculopathy more than once  What are the risks of treatment for lumbar radiculopathy? Without treatment, your pain may worsen  The pinched and swollen nerve may lead to problems when you walk or move  In severe cases, you may lose control of your urine or bowel movements  Bedrest can make your symptoms worse  Pain medicines can cause vomiting, upset stomach, constipation (large, hard bowel movements that are difficult to pass), or kidney or liver problems  Opioid medicine may be addictive (hard to quit taking once you start)  Oral steroids and steroid injections may have side effects, such as facial redness, fluid retention (water weight gain), and mood changes  Steroid injections may be painful and can cause severe headaches, infections, allergic reactions, or nerve damage   Surgery risks include delayed problems with healing, spinal or bladder infection, damage to the spinal cord or other nerves, and ongoing pain  In rare cases, you could become paralyzed (not able to move your arms or legs)  How can I care for myself when I have lumbar radiculopathy? Stay active: It is best to be active when you have lumbar radiculopathy  Your healthcare provider may tell you to take walks to ease yourself back into your daily routine  Avoid long periods of bed rest  Bed rest could worsen your symptoms  Do not move in ways that increase your pain  Ask your healthcare provider for more information about the best ways to stay active  Use ice or heat packs:  Use ice or heat packs on the sore area of your body to decrease the pain and swelling  Put ice in a plastic bag covered with a towel on your low back  Cover heated items with a towel to avoid burns  Use ice and heat as directed  Avoid heavy lifting: Your condition may worsen if you lift heavy things  Avoid lifting if possible  Maintain a healthy weight:  Excess body weight may strain your back  Talk with your healthcare provider about ways to lose excess weight if you are overweight  When should I contact my healthcare provider? Your pain does not improve within 1 to 3 weeks after treatment  Your pain and weakness keep you from your normal activities at work, home, or school  You lose more than 10 pounds in 6 months without trying  You become depressed or sad because of the pain  You have questions or concerns about your condition or care  When should I seek immediate care or call 911? You have a fever greater than 100 4°F for longer than 2 days  You have new, severe back or leg pain, or your pain spreads to both legs  You have any new signs of numbness or weakness, especially in your lower back, legs, arms, or genital area  You have new trouble controlling your urine and bowel movements  You do not feel like your bladder empties when you urinate      CARE AGREEMENT:   You have the right to help plan your care  Learn about your health condition and how it may be treated  Discuss treatment options with your healthcare providers to decide what care you want to receive  You always have the right to refuse treatment  The above information is an  only  It is not intended as medical advice for individual conditions or treatments  Talk to your doctor, nurse or pharmacist before following any medical regimen to see if it is safe and effective for you  © Copyright [employer] 2022 Information is for End User's use only and may not be sold, redistributed or otherwise used for commercial purposes   All illustrations and images included in CareNotes® are the copyrighted property of [employer]  or [address] [place] [address] [employer]

## 2022-05-12 NOTE — PROGRESS NOTES
[facility] - [facility]    NAME: Yi Foy  AGE: 68 y o  SEX: female  : 1945     DATE: 2022     Assessment and Plan:     1  Lumbar radiculopathy  Lower back pain x1 week  Pain radiates now down into the left buttocks and to front of left thigh  Reports burning into the thigh  Will start her on prednisone tapering dose, gabapentin for the neuralgia, referral to physical therapy  Patient was also instructed to use over-the-counter lidocaine patches for creams, ice and heat, may also use Tylenol arthritis    - Ambulatory Referral to Physical Therapy; Future  - predniSONE 10 mg tablet; Take 4 tablets (40 mg total) by mouth daily for 3 days, THEN 3 tablets (30 mg total) daily for 3 days, THEN 2 tablets (20 mg total) daily for 3 days, THEN 1 tablet (10 mg total) daily for 3 days  Dispense: 30 tablet; Refill: 0  - gabapentin (Neurontin) 100 mg capsule; Take 1 capsule (100 mg total) by mouth daily at bedtime as needed (pain) May take one at bedtime if one is not effective may take 2  Dispense: 30 capsule; Refill: 1        No follow-ups on file  Chief Complaint:     Chief Complaint   Patient presents with    Back Pain     Patient reports back pain for over 1 week been to chiropractor and no relief feel like there is something sitting on her nerve        History of Present Illness:     Yi presents to the office today for evaluation of lower back pain that started 1 week ago  She has seen a chiropractor twice with no improvement  She states now the pain is radiating into her left leg  She states that the anterior portion of her left thigh feels like there is a hot poker going throat  She denies any injury or aggravating factors to cause back pain  She has been using over-the-counter pain patches and creams  She has been taking Tylenol Motrin  With minimal relief    She denies fever or chills, changes in bowel or bladder habits, weakness or numbness into the left lower extremity     Review of Systems:     Review of Systems   Constitutional: Positive for activity change (From pain)  Negative for chills and fever  HENT: Negative  Respiratory: Negative  Cardiovascular: Negative  Gastrointestinal: Negative  Musculoskeletal: Positive for back pain and myalgias (Left buttocks and left  thigh)  Skin: Negative  Neurological: Negative  Psychiatric/Behavioral: Positive for sleep disturbance  Problem List:     Patient Active Problem List   Diagnosis    Depression    Prediabetes    Morbid obesity with BMI of 50 0-59 9, adult ([facility] )    Borderline hyperlipidemia    Osteopenia of multiple sites    Vitamin D deficiency    CASEY (obstructive sleep apnea)    Other irritable bowel syndrome    Thrombocytopenia (HCC)    History of breast cancer        Objective:     /68 (BP Location: Left arm, Patient Position: Sitting, Cuff Size: Large)   Pulse 78   Temp 98 6 °F (37 °C) (Tympanic)   Resp 20   Ht 4' 10" (1 473 m)   Wt 104 kg (230 lb)   SpO2 96%   BMI 48 07 kg/m²     Physical Exam  Vitals and nursing note reviewed  Constitutional:       General: She is not in acute distress  Appearance: She is well-developed  She is obese  HENT:      Head: Normocephalic and atraumatic  Right Ear: Tympanic membrane normal       Left Ear: Tympanic membrane normal       Nose: Nose normal  No congestion or rhinorrhea  Mouth/Throat:      Mouth: Mucous membranes are moist       Pharynx: Oropharynx is clear  Eyes:      Conjunctiva/sclera: Conjunctivae normal       Pupils: Pupils are equal, round, and reactive to light  Neck:      Thyroid: No thyromegaly  Cardiovascular:      Rate and Rhythm: Normal rate and regular rhythm  Pulses: Normal pulses  Heart sounds: Normal heart sounds  No murmur heard  Pulmonary:      Effort: Pulmonary effort is normal  No respiratory distress  Breath sounds: Normal breath sounds  Abdominal:      General: Bowel sounds are normal       Palpations: Abdomen is soft  Tenderness: There is no abdominal tenderness  Musculoskeletal:         General: Normal range of motion  Cervical back: Normal range of motion and neck supple  Lumbar back: Swelling, spasms and tenderness present  No signs of trauma or bony tenderness  Back:         Legs:    Lymphadenopathy:      Cervical: No cervical adenopathy  Skin:     General: Skin is warm and dry  Capillary Refill: Capillary refill takes less than 2 seconds  Neurological:      General: No focal deficit present  Mental Status: She is alert and oriented to person, place, and time  Sensory: No sensory deficit  Motor: No weakness  Gait: Gait normal    Psychiatric:         Mood and Affect: Mood normal          Behavior: Behavior normal          Thought Content: Thought content normal          Judgment: Judgment normal          I spent 15 minutes with this patient      [address] [place] [address]  [facility]

## 2022-05-13 ENCOUNTER — RA CDI HCC (OUTPATIENT)
Dept: OTHER | Facility: HOSPITAL | Age: 77
End: 2022-05-13

## 2022-05-13 NOTE — PROGRESS NOTES
[facility]havik [facility]  coding opportunities       Chart reviewed, no opportunity found:   [address]        Patients Insurance     Medicare Insurance: [employer] Advantage

## 2022-05-17 ENCOUNTER — OFFICE VISIT (OUTPATIENT)
Dept: INTERNAL MEDICINE CLINIC | Facility: CLINIC | Age: 77
End: 2022-05-17
Payer: COMMERCIAL

## 2022-05-17 VITALS
DIASTOLIC BLOOD PRESSURE: 76 MMHG | SYSTOLIC BLOOD PRESSURE: 122 MMHG | HEIGHT: 58 IN | TEMPERATURE: 98.4 F | OXYGEN SATURATION: 97 % | HEART RATE: 78 BPM | BODY MASS INDEX: 48.28 KG/M2 | RESPIRATION RATE: 20 BRPM | WEIGHT: 230 LBS

## 2022-05-17 DIAGNOSIS — M54.16 LUMBAR RADICULOPATHY: Primary | ICD-10-CM

## 2022-05-17 PROCEDURE — 99213 OFFICE O/P EST LOW 20 MIN: CPT

## 2022-05-17 PROCEDURE — 1160F RVW MEDS BY RX/DR IN RCRD: CPT

## 2022-05-17 RX ORDER — GABAPENTIN 300 MG/1
300 CAPSULE ORAL 2 TIMES DAILY
Qty: 60 CAPSULE | Refills: 1 | Status: SHIPPED | OUTPATIENT
Start: 2022-05-17

## 2022-05-17 NOTE — PROGRESS NOTES
[facility] - [facility]    NAME: Yi Foy  AGE: 68 y o  SEX: female  : 1945     DATE: 2022     Assessment and Plan:     1  Lumbar radiculopathy  Patient continues on prednisone taper  The back pain itself has improved however patient continues to have neuralgia into left outer thigh  She has increased gabapentin 200 milligrams at bedtime however has no relief in pain in the still unable to sleep  Unable to obtain appointment with physical therapy before   She is going to try a physical therapy out of network that she has used in the past   Patient encouraged to continue using ice heat therapy, Tylenol, continue prednisone taper  Will increase Neurontin 300 milligrams b i d   Follow-up in 2 weeks  - gabapentin (Neurontin) 300 mg capsule; Take 1 capsule (300 mg total) by mouth in the morning and 1 capsule (300 mg total) in the evening  May take one at bedtime if one is not effective may take 2  Dispense: 60 capsule; Refill: 1        No follow-ups on file  Chief Complaint:     Chief Complaint   Patient presents with    Back Pain     Following up        History of Present Illness:     Yi presents to the office today for re-evaluation for back pain  She continues to have lower back pain that radiates into her left outer thigh  She states the pain in her back is actually improved however the pain in her thigh she persists  She states the gabapentin she has been taking 200 milligrams at night with no change  She is unable to get a physical therapy appointment until   She has seen physical therapy through a different health network and with like to pursue a possible appointment there  Review of Systems:     Review of Systems   Constitutional: Negative  HENT: Negative  Respiratory: Negative  Cardiovascular: Negative  Gastrointestinal: Negative  Genitourinary: Negative      Musculoskeletal: Positive for back pain and myalgias (Left outer thigh)  Neurological: Negative  Psychiatric/Behavioral: Positive for sleep disturbance  Problem List:     Patient Active Problem List   Diagnosis    Depression    Prediabetes    Morbid obesity with BMI of 50 0-59 9, adult ([address] )    Borderline hyperlipidemia    Osteopenia of multiple sites    Vitamin D deficiency    CASEY (obstructive sleep apnea)    Other irritable bowel syndrome    Thrombocytopenia (HCC)    History of breast cancer        Objective:     /76 (BP Location: Left arm, Patient Position: Sitting, Cuff Size: Large) Comment: pain in back feel like a tooth ache  Pulse 78   Temp 98 4 °F (36 9 °C) (Tympanic)   Resp 20   Ht 4' 10" (1 473 m)   Wt 104 kg (230 lb)   SpO2 97%   BMI 48 07 kg/m²     Physical Exam  Constitutional:       General: She is not in acute distress  Appearance: She is obese  HENT:      Head: Normocephalic and atraumatic  Right Ear: External ear normal       Left Ear: External ear normal       Nose: Nose normal       Mouth/Throat:      Mouth: Mucous membranes are moist       Pharynx: Oropharynx is clear  Eyes:      Conjunctiva/sclera: Conjunctivae normal    Cardiovascular:      Rate and Rhythm: Normal rate and regular rhythm  Pulses: Normal pulses  Heart sounds: Normal heart sounds  No murmur heard  Pulmonary:      Effort: Pulmonary effort is normal       Breath sounds: Normal breath sounds  No wheezing  Musculoskeletal:         General: Tenderness (Left outer thigh  Sensation differs from right pain over area when brushed  no rash, redness, or swelling) present  Normal range of motion  Cervical back: Neck supple  Skin:     General: Skin is warm and dry  Capillary Refill: Capillary refill takes less than 2 seconds  Neurological:      Mental Status: She is alert and oriented to person, place, and time     Psychiatric:         Mood and Affect: Mood normal          Behavior: Behavior normal  Thought Content: Thought content normal          Judgment: Judgment normal          I spent 15 minutes with this patient      OCTAVIO Khan  [facility] [address] [place] [address]

## 2022-05-17 NOTE — PATIENT INSTRUCTIONS
Increase Gabapentin t0 300mg twice a day      Meralgia Paresthetica   AMBULATORY CARE:   Meralgia paresthetica (MP)  is a condition that causes numbness, tingling, and burning pain in your thigh  MP occurs when the nerve that provides feeling to the area is pinched  Symptoms of MP  may happen in one or both thighs  Symptoms may get worse if you stand or walk for a long time  You may have any of the following:  Numbness and tingling in the outer part of your thigh    Burning, stinging, or aching in the front or outer part of your thigh    Lower back pain that goes down your legs    Skin that is extra sensitive to the touch    Dull, achy pain in your groin and buttocks    Call your local emergency number (911 in the [address] [place] [address],[address]) if:   You cannot feel or move your legs  Call your doctor if:   You have severe leg pain  Your symptoms do not improve with treatment  You have questions or concerns about your condition or care  Treatment  may not be needed  Symptoms may go away without treatment after a few weeks or months  If symptoms continue, you may need any of the following:  Medicines  may be given to relieve pain or decrease inflammation  Surgery  may be needed if your symptoms are severe and other treatments do not work  The nerve may be removed or the tissue around it cut to relieve pressure  Manage MP:   Take pressure off the nerve  Wear loose clothing  Do not wear tight pants, belts, or other tight clothes  Do not walk or stand for long periods of time  Ask your provider what a healthy weight is for you  He or she can help you create a safe weight loss plan if you are overweight  Go to physical therapy, if directed  A physical therapist teaches you exercises to help improve movement and strength, and to decrease pain  Follow up with your doctor as directed:  Write down your questions so you remember to ask them during your visits    © Copyright [employer] 2022 Information is for End User's use only and may not be sold, redistributed or otherwise used for commercial purposes  All illustrations and images included in CareNotes® are the copyrighted property of [employer]  or Dania Grier  The above information is an  only  It is not intended as medical advice for individual conditions or treatments  Talk to your doctor, nurse or pharmacist before following any medical regimen to see if it is safe and effective for you

## 2022-08-03 ENCOUNTER — OFFICE VISIT (OUTPATIENT)
Dept: UROLOGY | Facility: CLINIC | Age: 77
End: 2022-08-03
Payer: COMMERCIAL

## 2022-08-03 VITALS
OXYGEN SATURATION: 98 % | DIASTOLIC BLOOD PRESSURE: 76 MMHG | BODY MASS INDEX: 46.37 KG/M2 | WEIGHT: 230 LBS | HEART RATE: 78 BPM | HEIGHT: 59 IN | SYSTOLIC BLOOD PRESSURE: 124 MMHG

## 2022-08-03 DIAGNOSIS — N39.0 FREQUENT UTI: Primary | ICD-10-CM

## 2022-08-03 LAB
POST-VOID RESIDUAL VOLUME, ML POC: 97 ML
SL AMB  POCT GLUCOSE, UA: NORMAL
SL AMB LEUKOCYTE ESTERASE,UA: NORMAL
SL AMB POCT BILIRUBIN,UA: NORMAL
SL AMB POCT BLOOD,UA: NORMAL
SL AMB POCT CLARITY,UA: CLEAR
SL AMB POCT COLOR,UA: YELLOW
SL AMB POCT KETONES,UA: NORMAL
SL AMB POCT NITRITE,UA: NORMAL
SL AMB POCT PH,UA: 5.5
SL AMB POCT SPECIFIC GRAVITY,UA: 1.03
SL AMB POCT URINE PROTEIN: NORMAL
SL AMB POCT UROBILINOGEN: 0.2

## 2022-08-03 PROCEDURE — 99213 OFFICE O/P EST LOW 20 MIN: CPT | Performed by: PHYSICIAN ASSISTANT

## 2022-08-03 PROCEDURE — 51798 US URINE CAPACITY MEASURE: CPT | Performed by: PHYSICIAN ASSISTANT

## 2022-08-03 PROCEDURE — 81002 URINALYSIS NONAUTO W/O SCOPE: CPT | Performed by: PHYSICIAN ASSISTANT

## 2022-08-03 PROCEDURE — 1160F RVW MEDS BY RX/DR IN RCRD: CPT | Performed by: PHYSICIAN ASSISTANT

## 2022-08-03 RX ORDER — CYCLOBENZAPRINE HCL 10 MG
10 TABLET ORAL EVERY 8 HOURS
COMMUNITY
Start: 2022-06-14 | End: 2022-10-14 | Stop reason: CLARIF

## 2022-08-03 NOTE — PROGRESS NOTES
8/3/2022      Chief Complaint   Patient presents with    Urinary Tract Infection     Assessment and Plan    1  Recurrent UTIs  - No positive urine cultures since last visit  She is doing well and denies any UTIs  - Recommend continued cranberry supplementation, probiotic, proper hydration, and avoiding constipation to prevent UTIs  - PVR today 97 mL  Urine dip today negative  - Patient does have history of breast cancer and is not good candidate for topical vaginal estrogen cream     2  Urinary incontinence  3  Intermittent nocturnal enuresis  - Recommend CPAP use for sleep apnea  - Recommend nighttime fluid restriction, double voiding prior to bedtime, avoiding bladder irritants, and Kegel exercises  Can follow-up in 1 year unless having any bothersome issues or ongoing recurrent UTIs  She can contact our office for any signs and symptoms of UTIs  History of Present Illness  Yi Foy is a 68 y o  female here for follow up evaluation of  recurrent UTIs urinary incontinence  She has not had any positive urine culture since last visit  She has had a total of 2 positive urine cultures within the past year growing E coli  She states she typically gets 1 or 2 UTIs per year  No prior hospitalizations for UTI or any prior kidney infections  She typically experiences suprapubic pressure and dysuria with a UTI which resolved with antibiotics  She does suffer from chronic constipation issues and takes daily stool softener and fiber supplement  She additionally has chronic urinary incontinence and nocturnal enuresis  She has history of sleep apnea and was advised to restart her CPAP at last visit  She is awaiting on new device due to device recall  She reports these issues have been slightly improved  She denies any UTI since last visit  She is currently asymptomatic  She does have 2 children and reports each were difficult vaginal births  No prior  surgical manipulation    No family history of  malignancy  Review of Systems   Constitutional: Negative for chills and fever  Respiratory: Negative for shortness of breath  Cardiovascular: Negative for chest pain  Gastrointestinal: Negative for abdominal pain  Genitourinary: Negative for difficulty urinating, dysuria, flank pain, frequency, hematuria, pelvic pain and urgency  Neurological: Negative for dizziness                    Past Medical History  Past Medical History:   Diagnosis Date    Anxiety     Arthritis     Benign neoplasm of skin     Breast cancer ([place] [address] )     Bursitis     Right hip    Cancer ([place] [address] )     Depression     Diverticulitis     HL (hearing loss)     Hypercholesterolemia     IBS (irritable bowel syndrome)     Lyme disease     Morbid obesity ([place] [address] )     Partial small bowel obstruction (HCC)     Prediabetes     Sleep apnea 2018    Small bowel obstruction ([place] [address] ) 2021    Urinary tract infection 2022    Vitamin D deficiency        Past Social History  Past Surgical History:   Procedure Laterality Date    APPENDECTOMY  1950    BREAST LUMPECTOMY Right     In spring of 2020 as per pt      SECTION      COLECTOMY      COLONOSCOPY      COMPLETE  ,     HERNIA REPAIR      SMALL INTESTINE SURGERY       Social History     Tobacco Use   Smoking Status Never Smoker   Smokeless Tobacco Never Used       Past Family History  Family History   Problem Relation Age of Onset    No Known Problems Mother     Cancer Father         Gallbladder    Hypertension Father     Hypertension Son     Hypotension Son     Breast cancer Neg Hx     Colon cancer Neg Hx     Ovarian cancer Neg Hx     Uterine cancer Neg Hx     Cervical cancer Neg Hx        Past Social history  Social History     Socioeconomic History    Marital status: /Civil Union     Spouse name: Not on file    Number of children: Not on file    Years of education: Not on file    Highest education level: Not on file   Occupational History    Not on file   Tobacco Use    Smoking status: Never Smoker    Smokeless tobacco: Never Used   Vaping Use    Vaping Use: Never used   Substance and Sexual Activity    Alcohol use: No    Drug use: No    Sexual activity: Not Currently     Birth control/protection: Post-menopausal   Other Topics Concern    Not on file   Social History Narrative    ADVANCE DIRECTIVE DISCUSSED WITH PATIENT    NO ADVANCE DIRECTIVES     Social Determinants of Health     Financial Resource Strain: Not on file   Food Insecurity: Not on file   Transportation Needs: Not on file   Physical Activity: Not on file   Stress: Not on file   Social Connections: Not on file   Intimate Partner Violence: Not on file   Housing Stability: Not on file       Current Medications  Current Outpatient Medications   Medication Sig Dispense Refill    aspirin 81 MG tablet Take 81 mg by mouth daily      Cetirizine HCl 10 MG CAPS daily       cholecalciferol (VITAMIN D3) 1,000 units tablet Take 1 tablet by mouth 2 (two) times a day      cyclobenzaprine (FLEXERIL) 10 mg tablet Take 10 mg by mouth every 8 (eight) hours      Diclofenac Sodium (VOLTAREN) 1 % Apply 2 g topically 4 (four) times a day 200 g 3    Docusate Sodium 100 MG capsule Take 100 mg by mouth 2 (two) times a day      escitalopram (LEXAPRO) 20 mg tablet Take 1 tablet (20 mg total) by mouth daily 90 tablet 3    gabapentin (Neurontin) 300 mg capsule Take 1 capsule (300 mg total) by mouth in the morning and 1 capsule (300 mg total) in the evening   May take one at bedtime if one is not effective may take 2  60 capsule 1    hydrocortisone (PROCTOSOL HC) 2 5 % rectal cream Insert into the rectum daily as needed (Hemorrhoids) (Patient taking differently: Insert into the rectum daily as needed (Hemorrhoids) As needed) 30 g 5    Misc Natural Products (FIBER 7) POWD Take by mouth daily       Multiple Vitamins-Minerals (CENTRUM SILVER) tablet Take by mouth daily  nystatin-triamcinolone (MYCOLOG-II) ointment APPLY TO AFFECTED AREA TWICE A DAY 45 g 1    Probiotic Product (PROBIOTIC PO) Take by mouth      VITAMIN B COMPLEX-C PO Take by mouth daily       zinc gluconate 50 mg tablet Take 50 mg by mouth in the morning  As needed  No current facility-administered medications for this visit  Allergies  No Known Allergies      The following portions of the patient's history were reviewed and updated as appropriate: allergies, current medications, past medical history, past social history, past surgical history and problem list       Vitals  Vitals:    08/03/22 0827   BP: 124/76   Pulse: 78   SpO2: 98%   Weight: 104 kg (230 lb)   Height: 4' 11" (1 499 m)           Physical Exam  Physical Exam  Constitutional:       Appearance: Normal appearance  She is obese  HENT:      Head: Normocephalic and atraumatic  Right Ear: External ear normal       Left Ear: External ear normal    Eyes:      General: No scleral icterus  Conjunctiva/sclera: Conjunctivae normal    Cardiovascular:      Pulses: Normal pulses  Pulmonary:      Effort: Pulmonary effort is normal    Musculoskeletal:         General: Normal range of motion  Cervical back: Normal range of motion  Neurological:      Mental Status: She is alert and oriented to person, place, and time  Psychiatric:         Mood and Affect: Mood normal          Behavior: Behavior normal          Thought Content:  Thought content normal          Judgment: Judgment normal            Results  Recent Results (from the past 1 hour(s))   POCT urine dip    Collection Time: 08/03/22  8:40 AM   Result Value Ref Range    LEUKOCYTE ESTERASE,UA n     NITRITE,UA n     SL AMB POCT UROBILINOGEN 0 2     POCT URINE PROTEIN n      PH,UA 5 5     BLOOD,UA n     SPECIFIC GRAVITY,UA 1 030     KETONES,UA n     BILIRUBIN,UA n     GLUCOSE, UA n      COLOR,UA yellow     CLARITY,UA clear    POCT Measure PVR    Collection Time: 08/03/22 8:40 AM   Result Value Ref Range    POST-VOID RESIDUAL VOLUME, ML POC 97 mL   ]  No results found for: PSA  Lab Results   Component Value Date    GLUCOSE 107 09/28/2015    CALCIUM 9 2 03/23/2022     09/28/2015    K 4 1 03/23/2022    CO2 29 03/23/2022     (H) 03/23/2022    BUN 23 03/23/2022    CREATININE 0 81 03/23/2022     Lab Results   Component Value Date    WBC 5 84 03/23/2022    HGB 14 2 03/23/2022    HCT 41 4 03/23/2022    MCV 90 03/23/2022     (L) 03/23/2022           Orders  Orders Placed This Encounter   Procedures    POCT urine dip    POCT Measure PVR       Darrell Matute PA-C

## 2022-08-19 ENCOUNTER — HOSPITAL ENCOUNTER (EMERGENCY)
Facility: HOSPITAL | Age: 77
Discharge: HOME | End: 2022-08-19
Attending: EMERGENCY MEDICINE
Payer: COMMERCIAL

## 2022-08-19 VITALS
TEMPERATURE: 98.6 F | OXYGEN SATURATION: 98 % | RESPIRATION RATE: 16 BRPM | SYSTOLIC BLOOD PRESSURE: 159 MMHG | DIASTOLIC BLOOD PRESSURE: 74 MMHG | HEART RATE: 82 BPM

## 2022-08-19 DIAGNOSIS — R04.0 EPISTAXIS: Primary | ICD-10-CM

## 2022-08-19 PROCEDURE — 99282 EMERGENCY DEPT VISIT SF MDM: CPT | Performed by: EMERGENCY MEDICINE

## 2022-08-19 PROCEDURE — 99282 EMERGENCY DEPT VISIT SF MDM: CPT

## 2022-08-19 PROCEDURE — 30901 CONTROL OF NOSEBLEED: CPT | Performed by: EMERGENCY MEDICINE

## 2022-08-19 RX ORDER — OXYMETAZOLINE HYDROCHLORIDE 0.05 G/100ML
2 SPRAY NASAL ONCE
Status: COMPLETED | OUTPATIENT
Start: 2022-08-19 | End: 2022-08-19

## 2022-08-19 RX ORDER — TRANEXAMIC ACID 100 MG/ML
1000 INJECTION, SOLUTION INTRAVENOUS ONCE
Status: COMPLETED | OUTPATIENT
Start: 2022-08-19 | End: 2022-08-19

## 2022-08-19 RX ADMIN — TRANEXAMIC ACID 1000 MG: 1 INJECTION, SOLUTION INTRAVENOUS at 12:03

## 2022-08-19 RX ADMIN — OXYMETAZOLINE HYDROCHLORIDE 2 SPRAY: 0.05 SPRAY NASAL at 12:03

## 2022-08-25 NOTE — ED PROVIDER NOTES
History  Chief Complaint   Patient presents with    Nose Bleed     Pt reports right nose bleed that originally began on Sunday, stopped,and now began again this am  Denies thinners  27-year-old female presenting to emergency department for evaluation nosebleed  Patient has had right-sided epistaxis intermittently since Sunday, tonight it was not able to stop after applying pressure at home, does not take any thinners, there is no from the to the nose, she reports nose bleeding anteriorly as well some blood in the posterior oropharynx, she has been able to cough this up, and tolerated without difficulty she reports no choking gagging coughing or aspiration  She reports no shortness of breath  Prior to Admission Medications   Prescriptions Last Dose Informant Patient Reported? Taking? Cetirizine HCl 10 MG CAPS  Self Yes No   Sig: daily    Diclofenac Sodium (VOLTAREN) 1 %  Self No No   Sig: Apply 2 g topically 4 (four) times a day   Docusate Sodium 100 MG capsule  Self Yes No   Sig: Take 100 mg by mouth 2 (two) times a day   Misc Natural Products (FIBER 7) POWD  Self Yes No   Sig: Take by mouth daily    Multiple Vitamins-Minerals (CENTRUM SILVER) tablet  Self Yes No   Sig: Take by mouth daily    Probiotic Product (PROBIOTIC PO)   Yes No   Sig: Take by mouth   VITAMIN B COMPLEX-C PO  Self Yes No   Sig: Take by mouth daily    aspirin 81 MG tablet  Self Yes No   Sig: Take 81 mg by mouth daily   cholecalciferol (VITAMIN D3) 1,000 units tablet  Self Yes No   Sig: Take 1 tablet by mouth 2 (two) times a day   cyclobenzaprine (FLEXERIL) 10 mg tablet   Yes No   Sig: Take 10 mg by mouth every 8 (eight) hours   escitalopram (LEXAPRO) 20 mg tablet  Self No No   Sig: Take 1 tablet (20 mg total) by mouth daily   gabapentin (Neurontin) 300 mg capsule   No No   Sig: Take 1 capsule (300 mg total) by mouth in the morning and 1 capsule (300 mg total) in the evening   May take one at bedtime if one is not effective may take 2    hydrocortisone (PROCTOSOL HC) 2 5 % rectal cream   No No   Sig: Insert into the rectum daily as needed (Hemorrhoids)   Patient taking differently: Insert into the rectum daily as needed (Hemorrhoids) As needed   nystatin-triamcinolone (MYCOLOG-II) ointment  Self No No   Sig: APPLY TO AFFECTED AREA TWICE A DAY   zinc gluconate 50 mg tablet   Yes No   Sig: Take 50 mg by mouth in the morning  As needed  Facility-Administered Medications: None       Past Medical History:   Diagnosis Date    Anxiety     Arthritis     Benign neoplasm of skin     Breast [place] ([place] [address] )     Bursitis     Right hip    Cancer ([place] [address] ) 2019    Depression     Diverticulitis     HL (hearing loss)     Hypercholesterolemia     IBS (irritable bowel syndrome)     Lyme disease     Morbid obesity ([place] [address] )     Partial small bowel obstruction (HCC)     Prediabetes     Sleep apnea 2018    Small bowel obstruction ([place] [address] ) 2021    Urinary tract infection 2022    Vitamin D deficiency        Past Surgical History:   Procedure Laterality Date    APPENDECTOMY  1950    BREAST LUMPECTOMY Right     In spring of 2020 as per pt      SECTION  2010    COLECTOMY      COLONOSCOPY      COMPLETE  ,     HERNIA REPAIR      SMALL INTESTINE SURGERY  2012       Family History   Problem Relation Age of Onset    No Known Problems Mother     Cancer Father         Gallbladder    Hypertension Father     Hypertension Son     Hypotension Son     Breast cancer Neg Hx     Colon cancer Neg Hx     Ovarian cancer Neg Hx     Uterine cancer Neg Hx     Cervical cancer Neg Hx      I have reviewed and agree with the history as documented      E-Cigarette/Vaping    E-Cigarette Use Never User      E-Cigarette/Vaping Substances    Nicotine No     THC No     CBD No     Flavoring No     Other No     Unknown No      Social History     Tobacco Use    Smoking status: Never Smoker    Smokeless tobacco: Never Used   Vaping Use    Vaping Use: Never used   Substance Use Topics    Alcohol use: No    Drug use: No       Review of Systems   Constitutional: Negative for appetite change, chills, fatigue and fever  HENT: Positive for nosebleeds  Negative for sneezing and sore throat  Eyes: Negative for visual disturbance  Respiratory: Negative for cough, choking, chest tightness, shortness of breath and wheezing  Cardiovascular: Negative for chest pain and palpitations  Gastrointestinal: Negative for abdominal pain, constipation, diarrhea, nausea and vomiting  Genitourinary: Negative for difficulty urinating and dysuria  Neurological: Negative for dizziness, weakness, light-headedness, numbness and headaches  All other systems reviewed and are negative  Physical Exam  Physical Exam  Vitals and nursing note reviewed  Constitutional:       General: She is not in acute distress  Appearance: She is well-developed  She is not diaphoretic  HENT:      Head: Normocephalic and atraumatic  Comments: S right-sided anterior epistaxis noted as well as some clot/blood in the posterior oropharynx  Eyes:      Pupils: Pupils are equal, round, and reactive to light  Neck:      Vascular: No JVD  Trachea: No tracheal deviation  Cardiovascular:      Rate and Rhythm: Normal rate and regular rhythm  Heart sounds: Normal heart sounds  No murmur heard  No friction rub  No gallop  Pulmonary:      Effort: Pulmonary effort is normal  No respiratory distress  Breath sounds: Normal breath sounds  No wheezing or rales  Abdominal:      General: Bowel sounds are normal  There is no distension  Palpations: Abdomen is soft  Tenderness: There is no abdominal tenderness  There is no guarding or rebound  Skin:     General: Skin is warm and dry  Coloration: Skin is not pale  Neurological:      Mental Status: She is alert and oriented to person, place, and time        Cranial Nerves: No cranial nerve deficit  Motor: No abnormal muscle tone  Psychiatric:         Behavior: Behavior normal          Vital Signs  ED Triage Vitals   Temperature Pulse Respirations Blood Pressure SpO2   08/19/22 1048 08/19/22 1048 08/19/22 1048 08/19/22 1050 08/19/22 1048   98 6 °F (37 °C) 82 16 159/74 98 %      Temp Source Heart Rate Source Patient Position - Orthostatic VS BP Location FiO2 (%)   08/19/22 1048 08/19/22 1048 08/19/22 1048 08/19/22 1048 --   Oral Monitor Sitting Left arm       Pain Score       --                  Vitals:    08/19/22 1048 08/19/22 1050   BP:  159/74   Pulse: 82    Patient Position - Orthostatic VS: Sitting Sitting         Visual Acuity      ED Medications  Medications   oxymetazoline (AFRIN) 0 05 % nasal spray 2 spray (2 sprays Each Nare Given 8/19/22 1203)   tranexamic acid 100mg/mL (for epistaxis) 1,000 mg (1,000 mg Nasal Given 8/19/22 1203)       Diagnostic Studies  Results Reviewed     None                 No orders to display              Procedures  Epistaxis management    Date/Time: 8/19/2022 12:47 PM  Performed by: Anne Grant MD  Authorized by: Anne Grant MD   Universal Protocol:  Consent: Verbal consent obtained  Risks and benefits: risks, benefits and alternatives were discussed  Consent given by: patient      Patient location:  ED  Anesthesia (see MAR for exact dosages): Anesthesia method:  None  Procedure details:     Treatment site:  L anterior and L posterior    Repair method: TXA and Afrin  Approach:  External    Spec Headlamp used: No      Treatment complexity:  Limited    Treatment episode: initial    Post-procedure details:     Assessment:  Bleeding stopped    Patient tolerance of procedure:   Tolerated well, no immediate complications             ED Course                                             MDM  Number of Diagnoses or Management Options  Epistaxis  Diagnosis management comments: 72-year-old female with epistaxis, managed as described above, bleeding stopped, will discharge with ENT follow-up/return precautions  Disposition  Final diagnoses:   Epistaxis     Time reflects when diagnosis was documented in both MDM as applicable and the Disposition within this note     Time User Action Codes Description Comment    8/19/2022 12:38 PM Nasra Cerda Add [R04 0] Epistaxis       ED Disposition     ED Disposition   Discharge    Condition   Stable    Date/Time   Fri Aug 19, 2022 12:38 PM    Comment   Yi Foy discharge to home/self care  Follow-up Information     Follow up With Specialties Details Why Contact Info Additional Information    [facility], [address] Throat Otolaryngology   [address] [facility], [address] [facility], [address]  [id number] [place] [address] [address], [facility] [address]          Discharge Medication List as of 8/19/2022 12:39 PM      CONTINUE these medications which have NOT CHANGED    Details   aspirin 81 MG tablet Take 81 mg by mouth daily, Historical Med      Cetirizine HCl 10 MG CAPS daily , Historical Med      cholecalciferol (VITAMIN D3) 1,000 units tablet Take 1 tablet by mouth 2 (two) times a day, Starting Thu 6/29/2017, Historical Med      cyclobenzaprine (FLEXERIL) 10 mg tablet Take 10 mg by mouth every 8 (eight) hours, Starting Tue 6/14/2022, Historical Med      Diclofenac Sodium (VOLTAREN) 1 % Apply 2 g topically 4 (four) times a day, Starting Thu 3/31/2022, Normal      Docusate Sodium 100 MG capsule Take 100 mg by mouth 2 (two) times a day, Historical Med      escitalopram (LEXAPRO) 20 mg tablet Take 1 tablet (20 mg total) by mouth daily, Starting Thu 3/31/2022, Normal      gabapentin (Neurontin) 300 mg capsule Take 1 capsule (300 mg total) by mouth in the morning and 1 capsule (300 mg total) in the evening   May take one at bedtime if one is not effective may take 2 , Starting Tue 5/17/2022, Normal      hydrocortisone (PROCTOSOL HC) 2 5 % rectal cream Insert into the rectum daily as needed (Hemorrhoids), Starting Wed 1/8/2020, Normal      Misc Natural Products (FIBER 7) POWD Take by mouth daily , Historical Med      Multiple Vitamins-Minerals (CENTRUM SILVER) tablet Take by mouth daily , Historical Med      nystatin-triamcinolone (MYCOLOG-II) ointment APPLY TO AFFECTED AREA TWICE A DAY, Normal      Probiotic Product (PROBIOTIC PO) Take by mouth, Historical Med      VITAMIN B COMPLEX-C PO Take by mouth daily , Historical Med      zinc gluconate 50 mg tablet Take 50 mg by mouth in the morning  As needed , Historical Med             No discharge procedures on file      PDMP Review     None          ED Provider  Electronically Signed by           Cary Leiva MD  08/24/22 2059

## 2022-09-13 ENCOUNTER — TELEPHONE (OUTPATIENT)
Dept: OBGYN CLINIC | Facility: CLINIC | Age: 77
End: 2022-09-13

## 2022-10-13 ENCOUNTER — APPOINTMENT (OUTPATIENT)
Dept: LAB | Facility: CLINIC | Age: 77
End: 2022-10-13
Payer: COMMERCIAL

## 2022-10-13 DIAGNOSIS — R73.03 PREDIABETES: Chronic | ICD-10-CM

## 2022-10-13 DIAGNOSIS — E78.5 BORDERLINE HYPERLIPIDEMIA: ICD-10-CM

## 2022-10-13 DIAGNOSIS — D69.6 THROMBOCYTOPENIA (HCC): ICD-10-CM

## 2022-10-13 LAB
ANION GAP SERPL CALCULATED.3IONS-SCNC: 5 MMOL/L (ref 4–13)
BUN SERPL-MCNC: 19 MG/DL (ref 5–25)
CALCIUM SERPL-MCNC: 9.4 MG/DL (ref 8.3–10.1)
CHLORIDE SERPL-SCNC: 108 MMOL/L (ref 96–108)
CHOLEST SERPL-MCNC: 171 MG/DL
CO2 SERPL-SCNC: 28 MMOL/L (ref 21–32)
CREAT SERPL-MCNC: 0.74 MG/DL (ref 0.6–1.3)
ERYTHROCYTE [DISTWIDTH] IN BLOOD BY AUTOMATED COUNT: 12.8 % (ref 11.6–15.1)
EST. AVERAGE GLUCOSE BLD GHB EST-MCNC: 128 MG/DL
GFR SERPL CREATININE-BSD FRML MDRD: 78 ML/MIN/1.73SQ M
GLUCOSE P FAST SERPL-MCNC: 111 MG/DL (ref 65–99)
HBA1C MFR BLD: 6.1 %
HCT VFR BLD AUTO: 40.7 % (ref 34.8–46.1)
HDLC SERPL-MCNC: 52 MG/DL
HGB BLD-MCNC: 13.2 G/DL (ref 11.5–15.4)
LDLC SERPL CALC-MCNC: 100 MG/DL (ref 0–100)
MCH RBC QN AUTO: 29.6 PG (ref 26.8–34.3)
MCHC RBC AUTO-ENTMCNC: 32.4 G/DL (ref 31.4–37.4)
MCV RBC AUTO: 91 FL (ref 82–98)
NONHDLC SERPL-MCNC: 119 MG/DL
PLATELET # BLD AUTO: 137 THOUSANDS/UL (ref 149–390)
PMV BLD AUTO: 11.2 FL (ref 8.9–12.7)
POTASSIUM SERPL-SCNC: 4.3 MMOL/L (ref 3.5–5.3)
RBC # BLD AUTO: 4.46 MILLION/UL (ref 3.81–5.12)
SODIUM SERPL-SCNC: 141 MMOL/L (ref 135–147)
TRIGL SERPL-MCNC: 96 MG/DL
WBC # BLD AUTO: 4.94 THOUSAND/UL (ref 4.31–10.16)

## 2022-10-13 PROCEDURE — 80048 BASIC METABOLIC PNL TOTAL CA: CPT

## 2022-10-13 PROCEDURE — 83036 HEMOGLOBIN GLYCOSYLATED A1C: CPT

## 2022-10-13 PROCEDURE — 80061 LIPID PANEL: CPT

## 2022-10-13 PROCEDURE — 85027 COMPLETE CBC AUTOMATED: CPT

## 2022-10-13 PROCEDURE — 36415 COLL VENOUS BLD VENIPUNCTURE: CPT

## 2022-10-14 ENCOUNTER — OFFICE VISIT (OUTPATIENT)
Dept: INTERNAL MEDICINE CLINIC | Facility: CLINIC | Age: 77
End: 2022-10-14
Payer: COMMERCIAL

## 2022-10-14 VITALS
HEIGHT: 59 IN | SYSTOLIC BLOOD PRESSURE: 132 MMHG | WEIGHT: 231 LBS | HEART RATE: 70 BPM | BODY MASS INDEX: 46.57 KG/M2 | OXYGEN SATURATION: 98 % | DIASTOLIC BLOOD PRESSURE: 82 MMHG

## 2022-10-14 DIAGNOSIS — R73.03 PREDIABETES: Chronic | ICD-10-CM

## 2022-10-14 DIAGNOSIS — F33.42 RECURRENT MAJOR DEPRESSION IN FULL REMISSION (HCC): Primary | ICD-10-CM

## 2022-10-14 DIAGNOSIS — E78.5 BORDERLINE HYPERLIPIDEMIA: ICD-10-CM

## 2022-10-14 DIAGNOSIS — Z23 ENCOUNTER FOR IMMUNIZATION: ICD-10-CM

## 2022-10-14 DIAGNOSIS — L29.9 PRURITUS: ICD-10-CM

## 2022-10-14 DIAGNOSIS — N39.3 SUI (STRESS URINARY INCONTINENCE, FEMALE): ICD-10-CM

## 2022-10-14 DIAGNOSIS — M85.89 OSTEOPENIA OF MULTIPLE SITES: ICD-10-CM

## 2022-10-14 LAB — BACTERIA UR CULT: NORMAL

## 2022-10-14 PROCEDURE — G0008 ADMIN INFLUENZA VIRUS VAC: HCPCS | Performed by: INTERNAL MEDICINE

## 2022-10-14 PROCEDURE — 99214 OFFICE O/P EST MOD 30 MIN: CPT | Performed by: INTERNAL MEDICINE

## 2022-10-14 PROCEDURE — 90662 IIV NO PRSV INCREASED AG IM: CPT | Performed by: INTERNAL MEDICINE

## 2022-10-14 RX ORDER — MAGNESIUM 30 MG
30 TABLET ORAL 2 TIMES DAILY
COMMUNITY

## 2022-10-14 RX ORDER — ESCITALOPRAM OXALATE 20 MG/1
20 TABLET ORAL DAILY
Qty: 90 TABLET | Refills: 3 | Status: SHIPPED | OUTPATIENT
Start: 2022-10-14

## 2022-10-14 NOTE — PROGRESS NOTES
[facility] [address] [place] [address]    NAME: Yi Foy  AGE: 68 y o  SEX: female  : 1945     DATE: 10/14/2022     Assessment and Plan:     1  Recurrent major depression in full remission ([address] )    Stable and doing well on lexapro  Refill sent to pharmacy  - escitalopram (LEXAPRO) 20 mg tablet; Take 1 tablet (20 mg total) by mouth daily  Dispense: 90 tablet; Refill: 3    2  Osteopenia of multiple sites    Due for updated DXA scan    - DXA bone density spine hip and pelvis; Future    3  DANN (stress urinary incontinence, female)    See below    4  Prediabetes    Most recent A1c was 6 1 % on 10/13/2022  Will continue to monitor  Watch carb intake  5  Borderline hyperlipidemia    Stable and got a little better  Will monitor     - Comprehensive metabolic panel; Future  - CBC; Future  - Lipid panel; Future    6  Pruritus  - Ambulatory Referral to Dermatology; Future    7  Encounter for immunization  - influenza vaccine, high-dose, PF 0 7 mL (FLUZONE HIGH-DOSE)      Urinary Incontinence Plan of Care: counseling topics discussed: practice Kegel (pelvic floor strengthening) exercises, limit alcohol, caffeine, spicy foods, and acidic foods, limiting fluid intake 3-4 hours before bed and preventing constipation  No follow-ups on file  History of Present Illness:     Yi presents for follow-up  A1c has gone up again  Most recent A1c was 6 1 % on 10/13/2022  Has struggled with her weight over the years  Has hand arthritis and that bothers her from a pain standpoint at times  Really hurts if she bangs it on something  A lot of itching with her arms and back, especially at night  Review of Systems:     Review of Systems   Constitutional: Negative for activity change, appetite change and fatigue  Respiratory: Negative for apnea, cough, chest tightness, shortness of breath and wheezing      Cardiovascular: Negative for chest pain, palpitations and leg swelling  Gastrointestinal: Negative for abdominal distention, abdominal pain, blood in stool, constipation, diarrhea, nausea and vomiting  Musculoskeletal: Positive for arthralgias  Skin:        Pruritus   Neurological: Negative for dizziness, weakness, light-headedness, numbness and headaches  Psychiatric/Behavioral: Negative for behavioral problems, confusion, hallucinations, sleep disturbance and suicidal ideas  The patient is not nervous/anxious  Objective:     /82 (BP Location: Left arm, Patient Position: Sitting, Cuff Size: Standard)   Pulse 70   Ht 4' 11" (1 499 m)   Wt 105 kg (231 lb)   SpO2 98%   BMI 46 66 kg/m²     Physical Exam  Vitals reviewed  Constitutional:       General: She is not in acute distress  Appearance: She is well-developed  She is not diaphoretic  Neck:      Thyroid: No thyromegaly  Vascular: No JVD  Cardiovascular:      Rate and Rhythm: Normal rate and regular rhythm  Heart sounds: Normal heart sounds  No murmur heard  Pulmonary:      Effort: Pulmonary effort is normal  No respiratory distress  Breath sounds: Normal breath sounds  No wheezing or rales  Abdominal:      General: Bowel sounds are normal  There is no distension  Palpations: Abdomen is soft  Tenderness: There is no abdominal tenderness  Musculoskeletal:      Right lower leg: No edema  Left lower leg: No edema  Neurological:      Mental Status: She is alert     Psychiatric:         Mood and Affect: Mood normal          Behavior: Behavior normal          [address]

## 2022-10-17 ENCOUNTER — TELEPHONE (OUTPATIENT)
Dept: UROLOGY | Facility: CLINIC | Age: 77
End: 2022-10-17

## 2022-10-17 NOTE — TELEPHONE ENCOUNTER
Called and left VM for PT  Pt urine culture is negative   Office number was left if Pt has any questions [phone]     ----- Message from Monika Hernandez PA-C sent at 10/17/2022  7:44 AM EDT -----  Urine culture negative

## 2022-10-26 NOTE — TELEPHONE ENCOUNTER
CALLED PT  SPOKE TO  AND WAS NOTIFIED Physical Therapy  CICU PT Attempt    Patient not seen in therapy.     Unavailable due to medical tests/procedures.      Patient with other health care provider    Re-attempt plan: later today or per established plan of care    Patient receiving bedside KD                               Therapy procedure time and total treatment time can be found documented on the Time Entry flowsheet

## 2023-05-01 ENCOUNTER — APPOINTMENT (OUTPATIENT)
Age: 78
End: 2023-05-01

## 2023-05-01 ENCOUNTER — TELEPHONE (OUTPATIENT)
Dept: UROLOGY | Facility: MEDICAL CENTER | Age: 78
End: 2023-05-01

## 2023-05-01 DIAGNOSIS — N39.0 FREQUENT UTI: Primary | ICD-10-CM

## 2023-05-01 DIAGNOSIS — E78.5 BORDERLINE HYPERLIPIDEMIA: ICD-10-CM

## 2023-05-01 LAB
ALBUMIN SERPL BCP-MCNC: 3.6 G/DL (ref 3.5–5)
ALP SERPL-CCNC: 68 U/L (ref 46–116)
ALT SERPL W P-5'-P-CCNC: 25 U/L (ref 12–78)
ANION GAP SERPL CALCULATED.3IONS-SCNC: 2 MMOL/L (ref 4–13)
AST SERPL W P-5'-P-CCNC: 30 U/L (ref 5–45)
BILIRUB SERPL-MCNC: 0.43 MG/DL (ref 0.2–1)
BUN SERPL-MCNC: 23 MG/DL (ref 5–25)
CALCIUM SERPL-MCNC: 9.5 MG/DL (ref 8.3–10.1)
CHLORIDE SERPL-SCNC: 110 MMOL/L (ref 96–108)
CHOLEST SERPL-MCNC: 186 MG/DL
CO2 SERPL-SCNC: 27 MMOL/L (ref 21–32)
CREAT SERPL-MCNC: 0.72 MG/DL (ref 0.6–1.3)
ERYTHROCYTE [DISTWIDTH] IN BLOOD BY AUTOMATED COUNT: 13.8 % (ref 11.6–15.1)
GFR SERPL CREATININE-BSD FRML MDRD: 80 ML/MIN/1.73SQ M
GLUCOSE P FAST SERPL-MCNC: 98 MG/DL (ref 65–99)
HCT VFR BLD AUTO: 43.1 % (ref 34.8–46.1)
HDLC SERPL-MCNC: 57 MG/DL
HGB BLD-MCNC: 14 G/DL (ref 11.5–15.4)
LDLC SERPL CALC-MCNC: 100 MG/DL (ref 0–100)
MCH RBC QN AUTO: 29.8 PG (ref 26.8–34.3)
MCHC RBC AUTO-ENTMCNC: 32.5 G/DL (ref 31.4–37.4)
MCV RBC AUTO: 92 FL (ref 82–98)
NONHDLC SERPL-MCNC: 129 MG/DL
PLATELET # BLD AUTO: 134 THOUSANDS/UL (ref 149–390)
PMV BLD AUTO: 11 FL (ref 8.9–12.7)
POTASSIUM SERPL-SCNC: 4.1 MMOL/L (ref 3.5–5.3)
PROT SERPL-MCNC: 6.9 G/DL (ref 6.4–8.4)
RBC # BLD AUTO: 4.7 MILLION/UL (ref 3.81–5.12)
SODIUM SERPL-SCNC: 139 MMOL/L (ref 135–147)
TRIGL SERPL-MCNC: 144 MG/DL
WBC # BLD AUTO: 5.53 THOUSAND/UL (ref 4.31–10.16)

## 2023-05-01 NOTE — TELEPHONE ENCOUNTER
Scheduled appt for 8/1 at 11 am with Mary Carmen Rivera LMOM to [facility] call  To change appt if time and day were not good

## 2023-05-01 NOTE — TELEPHONE ENCOUNTER
Pt called requested an order for urine tests  Pt has UTI and wants to check the urine       Please review

## 2023-05-04 ENCOUNTER — OFFICE VISIT (OUTPATIENT)
Age: 78
End: 2023-05-04

## 2023-05-04 VITALS
DIASTOLIC BLOOD PRESSURE: 70 MMHG | SYSTOLIC BLOOD PRESSURE: 116 MMHG | RESPIRATION RATE: 20 BRPM | BODY MASS INDEX: 47.78 KG/M2 | OXYGEN SATURATION: 98 % | TEMPERATURE: 98 F | HEART RATE: 70 BPM | WEIGHT: 237 LBS | HEIGHT: 59 IN

## 2023-05-04 DIAGNOSIS — R73.03 PREDIABETES: Chronic | ICD-10-CM

## 2023-05-04 DIAGNOSIS — E78.5 BORDERLINE HYPERLIPIDEMIA: ICD-10-CM

## 2023-05-04 DIAGNOSIS — F33.42 RECURRENT MAJOR DEPRESSION IN FULL REMISSION (HCC): Primary | ICD-10-CM

## 2023-05-04 DIAGNOSIS — E66.01 MORBID OBESITY WITH BMI OF 45.0-49.9, ADULT (HCC): Chronic | ICD-10-CM

## 2023-05-04 DIAGNOSIS — Z00.00 MEDICARE ANNUAL WELLNESS VISIT, SUBSEQUENT: ICD-10-CM

## 2023-05-04 DIAGNOSIS — D69.6 THROMBOCYTOPENIA (HCC): ICD-10-CM

## 2023-05-04 NOTE — PROGRESS NOTES
Assessment and Plan:     1  Recurrent major depression in full remission ([facility] )    Stable at this time  Continue lexapro  2  Morbid obesity with BMI of 45 0-49 9, adult ([facility] [address] )    Needs to make diet & lifestyle changes  3  Thrombocytopenia (HCC)    Stable and mild  No bleeding episodes  Will monitor     - CBC; Future    4  Borderline hyperlipidemia    Heart healthy diet  Weight loss  Will monitor lipids  - Lipid panel; Future  - Basic metabolic panel; Future    5  Prediabetes    Glucose levels came down on most recent labs  Discussed dietary advice with her  Repeat A1c before next appointment  - Hemoglobin A1C; Future    6  Medicare annual wellness visit, subsequent     BMI Counseling: Body mass index is 47 87 kg/m²  The BMI is above normal  Nutrition recommendations include encouraging healthy choices of fruits and vegetables, moderation in carbohydrate intake and increasing intake of lean protein  Exercise recommendations include exercising 3-5 times per week  Rationale for BMI follow-up plan is due to patient being overweight or obese  Preventive health issues were discussed with patient, and age appropriate screening tests were ordered as noted in patient's After Visit Summary  Personalized health advice and appropriate referrals for health education or preventive services given if needed, as noted in patient's After Visit Summary  History of Present Illness:     Patient presents for a Medicare Wellness Visit    Yi presents for follow up and to go over labs  Labs are stable  Still struggling with her weight  Denies cardiac symptoms  Taking medications as prescribed  No worsening depression  Patient Care Team:  Grace Felton DO as PCP - General (Internal Medicine)     Review of Systems:     Review of Systems   Constitutional: Negative  Respiratory: Negative  Cardiovascular: Negative  Gastrointestinal: Negative  Musculoskeletal: Negative         Problem List: Patient Active Problem List   Diagnosis    Depression    Prediabetes    Morbid obesity with BMI of 50 0-59 9, adult ([place] [address] )    Borderline hyperlipidemia    Osteopenia of multiple sites    Vitamin D deficiency    CASEY (obstructive sleep apnea)    Other irritable bowel syndrome    [facility]rombocytopenia ([facility] )    History of breast cancer      Past Medical and Surgical History:     Past Medical History:   Diagnosis Date    Anxiety     Arthritis     Benign neoplasm of skin     Breast cancer ([facility] )     Bursitis     Right hip    Cancer ([place] [address] ) 2019    Depression     Diverticulitis     HL (hearing loss)     Hypercholesterolemia     IBS (irritable bowel syndrome)     Lyme disease     Morbid obesity ([facility] )     Partial small bowel obstruction ([place] [address] )     Prediabetes     Sleep apnea 2018    Small bowel obstruction ([place] [address] ) 2021    Urinary tract infection 2022    Vitamin D deficiency      Past Surgical History:   Procedure Laterality Date    APPENDECTOMY  1950    BREAST LUMPECTOMY Right     In spring of 2020 as per pt      SECTION  2010    COLECTOMY      COLONOSCOPY      COMPLETE  ,     HERNIA REPAIR      SMALL INTESTINE SURGERY        Family History:     Family History   Problem Relation Age of Onset    No Known Problems Mother     Cancer Father         Gallbladder    Hypertension Father     Hypertension Son     Hypotension Son     Breast cancer Neg Hx     Colon cancer Neg Hx     Ovarian cancer Neg Hx     Uterine cancer Neg Hx     Cervical cancer Neg Hx       Social History:     Social History     Socioeconomic History    Marital status: /Civil Union     Spouse name: None    Number of children: None    Years of education: None    Highest education level: None   Occupational History    None   Tobacco Use    Smoking status: Never    Smokeless tobacco: Never   Vaping Use    Vaping Use: Never used   Substance and Sexual Activity    Alcohol use:  No  Drug use: No    Sexual activity: Not Currently     Birth control/protection: Post-menopausal   Other Topics Concern    None   Social History Narrative    ADVANCE DIRECTIVE DISCUSSED WITH PATIENT    NO ADVANCE DIRECTIVES     Social Determinants of Health     Financial Resource Strain: Not on file   Food Insecurity: Not on file   Transportation Needs: Not on file   Physical Activity: Not on file   Stress: Not on file   Social Connections: Not on file   Intimate Partner Violence: Not on file   Housing Stability: Not on file      Medications and Allergies:     Current Outpatient Medications   Medication Sig Dispense Refill    aspirin 81 MG tablet Take 81 mg by mouth daily      Cetirizine HCl 10 MG CAPS daily       cholecalciferol (VITAMIN D3) 1,000 units tablet Take 1 tablet by mouth 2 (two) times a day      Diclofenac Sodium (VOLTAREN) 1 % Apply 2 g topically 4 (four) times a day 200 g 3    Docusate Sodium 100 MG capsule Take 100 mg by mouth 2 (two) times a day      escitalopram (LEXAPRO) 20 mg tablet Take 1 tablet (20 mg total) by mouth daily 90 tablet 3    gabapentin (Neurontin) 300 mg capsule Take 1 capsule (300 mg total) by mouth in the morning and 1 capsule (300 mg total) in the evening  May take one at bedtime if one is not effective may take 2   (Patient taking differently: Take 300 mg by mouth 2 (two) times a day as needed May take one at bedtime if one is not effective may take 2) 60 capsule 1    hydrocortisone (PROCTOSOL HC) 2 5 % rectal cream Insert into the rectum daily as needed (Hemorrhoids) (Patient taking differently: Insert into the rectum daily as needed (Hemorrhoids) As needed) 30 g 5    magnesium 30 MG tablet Take 30 mg by mouth 2 (two) times a day      Misc Natural Products (FIBER 7) POWD Take by mouth daily       Multiple Vitamins-Minerals (CENTRUM SILVER) tablet Take by mouth daily       nystatin-triamcinolone (MYCOLOG-II) ointment APPLY TO AFFECTED AREA TWICE A DAY 45 g 1    Probiotic Product (PROBIOTIC PO) Take by mouth      VITAMIN B COMPLEX-C PO Take by mouth daily       zinc gluconate 50 mg tablet Take 50 mg by mouth in the morning  As needed  No current facility-administered medications for this visit  No Known Allergies   Immunizations:     Immunization History   Administered Date(s) Administered    INFLUENZA 09/15/2009, 09/06/2012, 11/03/2013, 10/18/2014, 10/17/2015, 11/11/2018, 11/11/2019    Influenza Split High Dose Preservative Free IM 10/13/2017, 11/11/2018, 11/10/2019    Influenza, high dose seasonal 0 7 mL 10/18/2020, 10/14/2022    Influenza, seasonal, injectable 09/21/2014, 11/27/2016    Pneumococcal Conjugate 13-Valent 03/02/2016    Pneumococcal Polysaccharide PPV23 05/21/2013    Tdap 1945, 06/28/2017    Zoster 05/21/2013      Health Maintenance:         Topic Date Due    DXA SCAN  05/15/2020    Hepatitis C Screening  Completed    Colorectal Cancer Screening  Discontinued         Topic Date Due    COVID-19 Vaccine (1) Never done      Medicare Screening Tests and Risk Assessments:     Yi is here for her Subsequent Wellness visit  Last Medicare Wellness visit information reviewed, patient interviewed and updates made to the record today  Health Risk Assessment:   Patient rates overall health as good  Patient feels that their physical health rating is same  Patient is satisfied with their life  Eyesight was rated as same  Hearing was rated as slightly worse  Patient feels that their emotional and mental health rating is same  Patients states they are never, rarely angry  Patient states they are sometimes unusually tired/fatigued  Pain experienced in the last 7 days has been none  Patient states that she has experienced no weight loss or gain in last 6 months  Depression Screening:   PHQ-9 Score: 0      Fall Risk Screening:    In the past year, patient has experienced: no history of falling in past year      Urinary Incontinence Screening:   Patient has not leaked urine accidently in the last six months  Home Safety:  Patient does not have trouble with stairs inside or outside of their home  Patient has working smoke alarms and has working carbon monoxide detector  Home safety hazards include: none  Nutrition:   Current diet is Regular  Medications:   Patient is currently taking over-the-counter supplements  OTC medications include: see medication list  Patient is able to manage medications  Activities of Daily Living (ADLs)/Instrumental Activities of Daily Living (IADLs):   Walk and transfer into and out of bed and chair?: Yes  Dress and groom yourself?: Yes    Bathe or shower yourself?: Yes    Feed yourself?  Yes  Do your laundry/housekeeping?: Yes  Manage your money, pay your bills and track your expenses?: Yes  Make your own meals?: Yes    Do your own shopping?: Yes    Durable Medical Equipment Suppliers  none    Previous Hospitalizations:   Any hospitalizations or ED visits within the last 12 months?: No      Advance Care Planning:   Living will: Yes    Durable POA for healthcare: No    Advanced directive: Yes    Five wishes given: No      Cognitive Screening:   Provider or family/friend/caregiver concerned regarding cognition?: No    PREVENTIVE SCREENINGS      Cardiovascular Screening:    General: Screening Not Indicated and History Lipid Disorder      Diabetes Screening:     General: Screening Current      Colorectal Cancer Screening:     General: Screening Not Indicated      Breast Cancer Screening:     General: History Breast Cancer      Cervical Cancer Screening:    General: Screening Not Indicated      Osteoporosis Screening:    General: Screening Current      Abdominal Aortic Aneurysm (AAA) Screening:        General: Screening Not Indicated      Lung Cancer Screening:     General: Screening Not Indicated      Hepatitis C Screening:    General: Screening Current    Screening, Brief Intervention, and Referral to "Treatment (SBIRT)    Screening  Typical number of drinks in a day: 0  Typical number of drinks in a week: 0  Interpretation: Low risk drinking behavior  AUDIT-C Screenin) How often did you have a drink containing alcohol in the past year? never  2) How many drinks did you have on a typical day when you were drinking in the past year? 0  3) How often did you have 6 or more drinks on one occasion in the past year? never    AUDIT-C Score: 0  Interpretation: Score 0-2 (female): Negative screen for alcohol misuse    Single Item Drug Screening:  How often have you used an illegal drug (including marijuana) or a prescription medication for non-medical reasons in the past year? never    Single Item Drug Screen Score: 0  Interpretation: Negative screen for possible drug use disorder    Brief Intervention  Alcohol & drug use screenings were reviewed  No concerns regarding substance use disorder identified  Other Counseling Topics:   Car/seat belt/driving safety, skin self-exam, sunscreen and regular weightbearing exercise  Physical Exam:     /70 (BP Location: Left arm, Patient Position: Sitting, Cuff Size: Standard)   Pulse 70   Temp 98 °F (36 7 °C) (Tympanic)   Resp 20   Ht 4' 11\" (1 499 m)   Wt 108 kg (237 lb)   SpO2 98%   BMI 47 87 kg/m²     Physical Exam  Constitutional:       General: She is not in acute distress  Appearance: She is not ill-appearing  Cardiovascular:      Rate and Rhythm: Normal rate and regular rhythm  Heart sounds: No murmur heard  Pulmonary:      Effort: Pulmonary effort is normal  No respiratory distress  Breath sounds: No wheezing  Abdominal:      General: Bowel sounds are normal  There is no distension  Tenderness: There is no abdominal tenderness  Musculoskeletal:      Right lower leg: No edema  Left lower leg: No edema  Neurological:      Mental Status: She is alert          Garry Nothstein, DO  "

## 2023-05-04 NOTE — PATIENT INSTRUCTIONS
Medicare Preventive Visit Patient Instructions  Thank you for completing your Welcome to Medicare Visit or Medicare Annual Wellness Visit today  Your next wellness visit will be due in one year (5/4/2024)  The screening/preventive services that you may require over the next 5-10 years are detailed below  Some tests may not apply to you based off risk factors and/or age  Screening tests ordered at today's visit but not completed yet may show as past due  Also, please note that scanned in results may not display below  Preventive Screenings:  Service Recommendations Previous Testing/Comments   Colorectal Cancer Screening  * Colonoscopy    * Fecal Occult Blood Test (FOBT)/Fecal Immunochemical Test (FIT)  * Fecal DNA/Cologuard Test  * Flexible Sigmoidoscopy Age: 39-70 years old   Colonoscopy: every 10 years (may be performed more frequently if at higher risk)  OR  FOBT/FIT: every 1 year  OR  Cologuard: every 3 years  OR  Sigmoidoscopy: every 5 years  Screening may be recommended earlier than age 39 if at higher risk for colorectal cancer  Also, an individualized decision between you and your healthcare provider will decide whether screening between the ages of 74-80 would be appropriate  Colonoscopy: 01/18/2017  FOBT/FIT: Not on file  Cologuard: Not on file  Sigmoidoscopy: Not on file    Screening Not Indicated     Breast Cancer Screening Age: 36 years old  Frequency: every 1-2 years  Not required if history of left and right mastectomy Mammogram: 05/21/2019    History Breast Cancer   Cervical Cancer Screening Between the ages of 21-29, pap smear recommended once every 3 years  Between the ages of 33-67, can perform pap smear with HPV co-testing every 5 years     Recommendations may differ for women with a history of total hysterectomy, cervical cancer, or abnormal pap smears in past  Pap Smear: 06/17/2021    Screening Not Indicated   Hepatitis C Screening Once for adults born between 1945 and 1965  More frequently in patients at high risk for Hepatitis C Hep C Antibody: 02/23/2021    Screening Current   Diabetes Screening 1-2 times per year if you're at risk for diabetes or have pre-diabetes Fasting glucose: 98 mg/dL (5/1/2023)  A1C: 6 1 % (10/13/2022)  Screening Current   Cholesterol Screening Once every 5 years if you don't have a lipid disorder  May order more often based on risk factors  Lipid panel: 05/01/2023    Screening Not Indicated  History Lipid Disorder     Other Preventive Screenings Covered by Medicare:  Abdominal Aortic Aneurysm (AAA) Screening: covered once if your at risk  You're considered to be at risk if you have a family history of AAA  Lung Cancer Screening: covers low dose CT scan once per year if you meet all of the following conditions: (1) Age 50-69; (2) No signs or symptoms of lung cancer; (3) Current smoker or have quit smoking within the last 15 years; (4) You have a tobacco smoking history of at least 20 pack years (packs per day multiplied by number of years you smoked); (5) You get a written order from a healthcare provider  Glaucoma Screening: covered annually if you're considered high risk: (1) You have diabetes OR (2) Family history of glaucoma OR (3)  aged 48 and older OR (3)  American aged 72 and older  Osteoporosis Screening: covered every 2 years if you meet one of the following conditions: (1) You're estrogen deficient and at risk for osteoporosis based off medical history and other findings; (2) Have a vertebral abnormality; (3) On glucocorticoid therapy for more than 3 months; (4) Have primary hyperparathyroidism; (5) On osteoporosis medications and need to assess response to drug therapy  Last bone density test (DXA Scan): 05/15/2018  HIV Screening: covered annually if you're between the age of 12-76  Also covered annually if you are younger than 13 and older than 72 with risk factors for HIV infection   For pregnant patients, it is covered up to 3 times per pregnancy  Immunizations:  Immunization Recommendations   Influenza Vaccine Annual influenza vaccination during flu season is recommended for all persons aged >= 6 months who do not have contraindications   Pneumococcal Vaccine   * Pneumococcal conjugate vaccine = PCV13 (Prevnar 13), PCV15 (Vaxneuvance), PCV20 (Prevnar 20)  * Pneumococcal polysaccharide vaccine = PPSV23 (Pneumovax) Adults 25-60 years old: 1-3 doses may be recommended based on certain risk factors  Adults 72 years old: 1-2 doses may be recommended based off what pneumonia vaccine you previously received   Hepatitis B Vaccine 3 dose series if at intermediate or high risk (ex: diabetes, end stage renal disease, liver disease)   Tetanus (Td) Vaccine - COST NOT COVERED BY MEDICARE PART B Following completion of primary series, a booster dose should be given every 10 years to maintain immunity against tetanus  Td may also be given as tetanus wound prophylaxis  Tdap Vaccine - COST NOT COVERED BY MEDICARE PART B Recommended at least once for all adults  For pregnant patients, recommended with each pregnancy  Shingles Vaccine (Shingrix) - COST NOT COVERED BY MEDICARE PART B  2 shot series recommended in those aged 48 and above     Health Maintenance Due:      Topic Date Due    DXA SCAN  05/15/2020    Hepatitis C Screening  Completed    Colorectal Cancer Screening  Discontinued     Immunizations Due:      Topic Date Due    COVID-19 Vaccine (1) Never done     Advance Directives   What are advance directives? Advance directives are legal documents that state your wishes and plans for medical care  These plans are made ahead of time in case you lose your ability to make decisions for yourself  Advance directives can apply to any medical decision, such as the treatments you want, and if you want to donate organs  What are the types of advance directives? There are many types of advance directives, and each state has rules about how to use them  You may choose a combination of any of the following:  Living will: This is a written record of the treatment you want  You can also choose which treatments you do not want, which to limit, and which to stop at a certain time  This includes surgery, medicine, IV fluid, and tube feedings  Durable power of  for healthcare [facility]): This is a written record that states who you want to make healthcare choices for you when you are unable to make them for yourself  This person, called a proxy, is usually a family member or a friend  You may choose more than 1 proxy  Do not resuscitate (DNR) order:  A DNR order is used in case your heart stops beating or you stop breathing  It is a request not to have certain forms of treatment, such as CPR  A DNR order may be included in other types of advance directives  Medical directive: This covers the care that you want if you are in a coma, near death, or unable to make decisions for yourself  You can list the treatments you want for each condition  Treatment may include pain medicine, surgery, blood transfusions, dialysis, IV or tube feedings, and a ventilator (breathing machine)  Values history: This document has questions about your views, beliefs, and how you feel and think about life  This information can help others choose the care that you would choose  Why are advance directives important? An advance directive helps you control your care  Although spoken wishes may be used, it is better to have your wishes written down  Spoken wishes can be misunderstood, or not followed  Treatments may be given even if you do not want them  An advance directive may make it easier for your family to make difficult choices about your care  Urinary Incontinence   Urinary incontinence (UI)  is when you lose control of your bladder  UI develops because your bladder cannot store or empty urine properly   The 3 most common types of UI are stress incontinence, urge incontinence, or both  Medicines:   May be given to help strengthen your bladder control  Report any side effects of medication to your healthcare provider  Do pelvic muscle exercises often:  Your pelvic muscles help you stop urinating  Squeeze these muscles tight for 5 seconds, then relax for 5 seconds  Gradually work up to squeezing for 10 seconds  Do 3 sets of 15 repetitions a day, or as directed  This will help strengthen your pelvic muscles and improve bladder control  Train your bladder:  Go to the bathroom at set times, such as every 2 hours, even if you do not feel the urge to go  You can also try to hold your urine when you feel the urge to go  For example, hold your urine for 5 minutes when you feel the urge to go  As that becomes easier, hold your urine for 10 minutes  Self-care:   Keep a UI record  Write down how often you leak urine and how much you leak  Make a note of what you were doing when you leaked urine  Drink liquids as directed  You may need to limit the amount of liquid you drink to help control your urine leakage  Do not drink any liquid right before you go to bed  Limit or do not have drinks that contain caffeine or alcohol  Prevent constipation  Eat a variety of high-fiber foods  Good examples are high-fiber cereals, beans, vegetables, and whole-grain breads  Walking is the best way to trigger your intestines to have a bowel movement  Exercise regularly and maintain a healthy weight  Weight loss and exercise will decrease pressure on your bladder and help you control your leakage  Use a catheter as directed  to help empty your bladder  A catheter is a tiny, plastic tube that is put into your bladder to drain your urine  Go to behavior therapy as directed  Behavior therapy may be used to help you learn to control your urge to urinate      Weight Management   Why it is important to manage your weight:  Being overweight increases your risk of health conditions such as heart disease, high blood pressure, type 2 diabetes, and certain types of cancer  It can also increase your risk for osteoarthritis, sleep apnea, and other respiratory problems  Aim for a slow, steady weight loss  Even a small amount of weight loss can lower your risk of health problems  How to lose weight safely:  A safe and healthy way to lose weight is to eat fewer calories and get regular exercise  You can lose up about 1 pound a week by decreasing the number of calories you eat by 500 calories each day  Healthy meal plan for weight management:  A healthy meal plan includes a variety of foods, contains fewer calories, and helps you stay healthy  A healthy meal plan includes the following:  Eat whole-grain foods more often  A healthy meal plan should contain fiber  Fiber is the part of grains, fruits, and vegetables that is not broken down by your body  Whole-grain foods are healthy and provide extra fiber in your diet  Some examples of whole-grain foods are whole-wheat breads and pastas, oatmeal, brown rice, and bulgur  Eat a variety of vegetables every day  Include dark, leafy greens such as spinach, kale, kailyn greens, and mustard greens  Eat yellow and orange vegetables such as carrots, sweet potatoes, and winter squash  Eat a variety of fruits every day  Choose fresh or canned fruit (canned in its own juice or light syrup) instead of juice  Fruit juice has very little or no fiber  Eat low-fat dairy foods  Drink fat-free (skim) milk or 1% milk  Eat fat-free yogurt and low-fat cottage cheese  Try low-fat cheeses such as mozzarella and other reduced-fat cheeses  Choose meat and other protein foods that are low in fat  Choose beans or other legumes such as split peas or lentils  Choose fish, skinless poultry (chicken or turkey), or lean cuts of red meat (beef or pork)  Before you cook meat or poultry, cut off any visible fat  Use less fat and oil  Try baking foods instead of frying them   Add less fat, such as margarine, sour cream, regular salad dressing and mayonnaise to foods  Eat fewer high-fat foods  Some examples of high-fat foods include french fries, doughnuts, ice cream, and cakes  Eat fewer sweets  Limit foods and drinks that are high in sugar  This includes candy, cookies, regular soda, and sweetened drinks  Exercise:  Exercise at least 30 minutes per day on most days of the week  Some examples of exercise include walking, biking, dancing, and swimming  You can also fit in more physical activity by taking the stairs instead of the elevator or parking farther away from stores  Ask your healthcare provider about the best exercise plan for you  © Copyright Sealed 2018 Information is for End User's use only and may not be sold, redistributed or otherwise used for commercial purposes   All illustrations and images included in CareNotes® are the copyrighted property of A D A M , [employer]  or [address] [place] [address]

## 2023-05-31 NOTE — TELEPHONE ENCOUNTER
Lm to abiola Vtama Pregnancy And Lactation Text: It is unknown if this medication can cause problems during pregnancy and breastfeeding.

## 2023-07-10 ENCOUNTER — TELEPHONE (OUTPATIENT)
Dept: DERMATOLOGY | Facility: CLINIC | Age: 78
End: 2023-07-10

## 2023-07-10 NOTE — TELEPHONE ENCOUNTER
Rec'd call from patient looking to schedule appt with Dr. Jessy Casey. She states that she's seen him many times in the past. Patient would be considered new patient. She has a "lesion/sore" on her ankle. One month. Closed. No scab, no weeping. Doesn't itch. Only hurts if she pushes on it. She states that it's the size of a 50 cent piece. She does not recall any trauma to the area. Dr. Jessy Casey currently has no availability. Created wait list for patient and put her on cancellation list.    She'll be called with availability for Dr. Jessy Casey.

## 2023-07-31 NOTE — PROGRESS NOTES
8/1/2023      Chief Complaint   Patient presents with   • Urinary Frequency     Assessment and Plan    1. Frequent UTIs  - No recent positive urine cultures over the past year. - Urine dip today negative for blood, leukocytes or nitrites  - PVR today slightly elevated at 114 mL   - Recommend timed voiding and double voiding   - Recommend continued cranberry supplementation, probiotic, proper hydration, and avoiding constipation to prevent UTIs.     - Patient does have history of breast cancer and is not good candidate for topical vaginal estrogen cream.  - Standing urine cultures  -Should she have ongoing UTIs or worsening urinary symptoms discussed further work-up with cystoscopy and urodynamic testing and potentially learning CIC which she defers at this time. .    2. Intermittent nocturnal enuresis  - Recommend continued CPAP use for sleep apnea. - Recommend nighttime fluid restriction, double voiding prior to bedtime, avoiding bladder irritants    - Will follow up in 1 year for reassessment. History of Present Illness  Yi Foy is a 66 y.o. female here for follow up evaluation of recurrent UTIs and urinary incontinence. Patient has not had any positive urine cultures over the past year. Historically she had reported she typically gets 1 or 2 UTIs per year. Denies any prior hospitalization for UTIs or any prior kidney infections. She does also have intermittent nocturnal enuresis/incontinence that is minor. Denies any prior  surgical manipulation. She denies any new urinary issues or symptoms. Denies any recent UTIs. Review of Systems   Constitutional: Negative for chills and fever. Respiratory: Negative for shortness of breath. Cardiovascular: Negative for chest pain. Gastrointestinal: Negative for abdominal pain. Genitourinary: Negative for difficulty urinating, dysuria, flank pain, frequency, hematuria and urgency. Neurological: Negative for dizziness.          Past Medical History  Past Medical History:   Diagnosis Date   • Anxiety    • Arthritis    • Benign neoplasm of skin    • Breast cancer ([address])    • Bursitis     Right hip   • Cancer ([address]) 2019   • Depression    • Diverticulitis    • Frequency of urination    • HL (hearing loss)    • Hypercholesterolemia    • IBS (irritable bowel syndrome)    • Lyme disease    • Morbid obesity ([address])    • Partial small bowel obstruction ([address])    • Prediabetes    • Sleep apnea 2018   • Small bowel obstruction ([address]) 2021   • Urinary tract infection 2022   • Vitamin D deficiency        Past Social History  Past Surgical History:   Procedure Laterality Date   • APPENDECTOMY  1950   • BREAST LUMPECTOMY Right     In spring of 2020 as per pt    •  SECTION     • COLECTOMY     • COLONOSCOPY      COMPLETE. ,    • HERNIA REPAIR     • SMALL INTESTINE SURGERY       Social History     Tobacco Use   Smoking Status Never   • Passive exposure: Never   Smokeless Tobacco Never       Past Family History  Family History   Problem Relation Age of Onset   • No Known Problems Mother    • Cancer Father         Gallbladder   • Hypertension Father    • Hypertension Son    • Hypotension Son    • Breast cancer Neg Hx    • Colon cancer Neg Hx    • Ovarian cancer Neg Hx    • Uterine cancer Neg Hx    • Cervical cancer Neg Hx        Past Social history  Social History     Socioeconomic History   • Marital status: /Civil Union     Spouse name: Not on file   • Number of children: Not on file   • Years of education: Not on file   • Highest education level: Not on file   Occupational History   • Not on file   Tobacco Use   • Smoking status: Never     Passive exposure: Never   • Smokeless tobacco: Never   Vaping Use   • Vaping Use: Never used   Substance and Sexual Activity   • Alcohol use: No   • Drug use: No   • Sexual activity: Not Currently     Birth control/protection: Post-menopausal   Other Topics Concern   • Not on file   Social History Narrative    ADVANCE DIRECTIVE DISCUSSED WITH PATIENT    NO ADVANCE DIRECTIVES     Social Determinants of Health     Financial Resource Strain: Low Risk  (5/4/2023)    Overall Financial Resource Strain (CARDIA)    • Difficulty of Paying Living Expenses: Not hard at all   Food Insecurity: Not on file   Transportation Needs: No Transportation Needs (5/4/2023)    PRAPARE - Transportation    • Lack of Transportation (Medical): No    • Lack of Transportation (Non-Medical): No   Physical Activity: Inactive (3/13/2021)    Exercise Vital Sign    • Days of Exercise per Week: 0 days    • Minutes of Exercise per Session: 0 min   Stress: No Stress Concern Present (3/13/2021)    [facility]    • Feeling of Stress : Not at all   Social Connections: Not on file   Intimate Partner Violence: Not on file   Housing Stability: Not on file       Current Medications  Current Outpatient Medications   Medication Sig Dispense Refill   • aspirin 81 MG tablet Take 81 mg by mouth daily     • Cetirizine HCl 10 MG CAPS daily      • cholecalciferol (VITAMIN D3) 1,000 units tablet Take 1 tablet by mouth 2 (two) times a day     • Docusate Sodium 100 MG capsule Take 100 mg by mouth 2 (two) times a day     • escitalopram (LEXAPRO) 20 mg tablet Take 1 tablet (20 mg total) by mouth daily 90 tablet 3   • hydrocortisone (PROCTOSOL HC) 2.5 % rectal cream Insert into the rectum daily as needed (Hemorrhoids) (Patient taking differently: Insert into the rectum daily as needed (Hemorrhoids) As needed) 30 g 5   • magnesium 30 MG tablet Take 30 mg by mouth 2 (two) times a day     • Misc Natural Products (FIBER 7) POWD Take by mouth daily      • Multiple Vitamins-Minerals (CENTRUM SILVER) tablet Take by mouth daily      • nystatin (MYCOSTATIN) powder Apply 1 application.  topically as needed To affected area     • nystatin-triamcinolone (MYCOLOG-II) ointment APPLY TO AFFECTED AREA TWICE A DAY 45 g 1   • Probiotic Product (PROBIOTIC PO) Take by mouth     • VITAMIN B COMPLEX-C PO Take by mouth daily      • zinc gluconate 50 mg tablet Take 50 mg by mouth in the morning. As needed. • Diclofenac Sodium (VOLTAREN) 1 % Apply 2 g topically 4 (four) times a day (Patient not taking: Reported on 8/1/2023) 200 g 3     No current facility-administered medications for this visit. Allergies  No Known Allergies      The following portions of the patient's history were reviewed and updated as appropriate: allergies, current medications, past medical history, past social history, past surgical history and problem list.      Vitals  Vitals:    08/01/23 1051   BP: 118/78   Pulse: 64   SpO2: 98%   Weight: 107 kg (235 lb)   Height: 4' 11" (1.499 m)           Physical Exam  Physical Exam  Constitutional:       Appearance: Normal appearance. She is obese. HENT:      Head: Normocephalic and atraumatic. Right Ear: External ear normal.      Left Ear: External ear normal.      Nose: Nose normal.   Eyes:      General: No scleral icterus. Conjunctiva/sclera: Conjunctivae normal.   Cardiovascular:      Pulses: Normal pulses. Pulmonary:      Effort: Pulmonary effort is normal.   Musculoskeletal:         General: Normal range of motion. Cervical back: Normal range of motion. Neurological:      General: No focal deficit present. Mental Status: She is alert and oriented to person, place, and time. Psychiatric:         Mood and Affect: Mood normal.         Behavior: Behavior normal.         Thought Content:  Thought content normal.         Judgment: Judgment normal.           Results  Recent Results (from the past 1 hour(s))   POCT urine dip    Collection Time: 08/01/23 11:03 AM   Result Value Ref Range    LEUKOCYTE ESTERASE,UA -     NITRITE,UA 0.2     SL AMB POCT UROBILINOGEN +     POCT URINE PROTEIN trace      PH,UA 5.0     BLOOD,UA -     SPECIFIC GRAVITY,UA 1.030     Mohsen Barrera -     GLUCOSE, UA -      COLOR,UA yellow     CLARITY,UA clear    POCT Measure PVR    Collection Time: 08/01/23 11:04 AM   Result Value Ref Range    POST-VOID RESIDUAL VOLUME, ML  mL   ]  No results found for: "PSA"  Lab Results   Component Value Date    GLUCOSE 107 09/28/2015    CALCIUM 9.5 05/01/2023     09/28/2015    K 4.1 05/01/2023    CO2 27 05/01/2023     (H) 05/01/2023    BUN 23 05/01/2023    CREATININE 0.72 05/01/2023     Lab Results   Component Value Date    WBC 5.53 05/01/2023    HGB 14.0 05/01/2023    HCT 43.1 05/01/2023    MCV 92 05/01/2023     (L) 05/01/2023           Orders  Orders Placed This Encounter   Procedures   • POCT urine dip   • POCT Measure PVR       Tai Bland

## 2023-08-01 ENCOUNTER — OFFICE VISIT (OUTPATIENT)
Dept: UROLOGY | Facility: CLINIC | Age: 78
End: 2023-08-01
Payer: COMMERCIAL

## 2023-08-01 VITALS
BODY MASS INDEX: 47.37 KG/M2 | HEIGHT: 59 IN | OXYGEN SATURATION: 98 % | HEART RATE: 64 BPM | SYSTOLIC BLOOD PRESSURE: 118 MMHG | WEIGHT: 235 LBS | DIASTOLIC BLOOD PRESSURE: 78 MMHG

## 2023-08-01 DIAGNOSIS — N39.0 FREQUENT UTI: Primary | ICD-10-CM

## 2023-08-01 DIAGNOSIS — N39.44 NOCTURNAL ENURESIS: ICD-10-CM

## 2023-08-01 LAB
POST-VOID RESIDUAL VOLUME, ML POC: 114 ML
SL AMB  POCT GLUCOSE, UA: NORMAL
SL AMB LEUKOCYTE ESTERASE,UA: NORMAL
SL AMB POCT BILIRUBIN,UA: NORMAL
SL AMB POCT BLOOD,UA: NORMAL
SL AMB POCT CLARITY,UA: CLEAR
SL AMB POCT COLOR,UA: YELLOW
SL AMB POCT KETONES,UA: NORMAL
SL AMB POCT NITRITE,UA: 0.2
SL AMB POCT PH,UA: 5
SL AMB POCT SPECIFIC GRAVITY,UA: 1.03
SL AMB POCT URINE PROTEIN: NORMAL
SL AMB POCT UROBILINOGEN: NORMAL

## 2023-08-01 PROCEDURE — 81002 URINALYSIS NONAUTO W/O SCOPE: CPT | Performed by: PHYSICIAN ASSISTANT

## 2023-08-01 PROCEDURE — 99213 OFFICE O/P EST LOW 20 MIN: CPT | Performed by: PHYSICIAN ASSISTANT

## 2023-08-01 PROCEDURE — 51798 US URINE CAPACITY MEASURE: CPT | Performed by: PHYSICIAN ASSISTANT

## 2023-08-01 RX ORDER — NYSTATIN 100000 [USP'U]/G
1 POWDER TOPICAL AS NEEDED
COMMUNITY
Start: 2023-06-02

## 2023-08-14 NOTE — TELEPHONE ENCOUNTER
Patient states she was put on medication for UTI  She finish abx which was a 3 day course  States she is not feeling better  Wants to know if we can send in another round of abx? General

## 2023-08-15 ENCOUNTER — OFFICE VISIT (OUTPATIENT)
Age: 78
End: 2023-08-15
Payer: COMMERCIAL

## 2023-08-15 VITALS — HEIGHT: 59 IN | BODY MASS INDEX: 47.37 KG/M2 | WEIGHT: 235 LBS

## 2023-08-15 DIAGNOSIS — L82.1 SEBORRHEIC KERATOSIS: ICD-10-CM

## 2023-08-15 DIAGNOSIS — I87.2 VENOUS STASIS DERMATITIS OF RIGHT LOWER EXTREMITY: ICD-10-CM

## 2023-08-15 DIAGNOSIS — D22.9 MULTIPLE NEVI: ICD-10-CM

## 2023-08-15 DIAGNOSIS — Z13.89 SCREENING FOR SKIN CONDITION: Primary | ICD-10-CM

## 2023-08-15 DIAGNOSIS — T07.XXXA MULTIPLE EXCORIATIONS: ICD-10-CM

## 2023-08-15 DIAGNOSIS — D18.01 CHERRY ANGIOMA: ICD-10-CM

## 2023-08-15 PROCEDURE — 99204 OFFICE O/P NEW MOD 45 MIN: CPT | Performed by: DERMATOLOGY

## 2023-08-15 NOTE — PATIENT INSTRUCTIONS
MELANOCYTIC NEVI ("Moles")    Melanocytic nevi ("moles") are tan or brown, raised or flat areas of the skin which have an increased number of melanocytes. Melanocytes are the cells in our body which make pigment and account for skin color. Some moles are present at birth (I.e., "congenital nevi"), while others come up later in life (i.e., "acquired nevi"). The sun can stimulate the body to make more moles. Sunburns are not the only thing that triggers more moles. Chronic sun exposure can do it too. Clinically distinguishing a healthy mole from melanoma may be difficult, even for experienced dermatologists. The "ABCDE's" of moles have been suggested as a means of helping to alert a person to a suspicious mole and the possible increased risk of melanoma. The suggestions for raising alert are as follows:    Asymmetry: Healthy moles tend to be symmetric, while melanomas are often asymmetric. Asymmetry means if you draw a line through the mole, the two halves do not match in color, size, shape, or surface texture. Asymmetry can be a result of rapid enlargement of a mole, the development of a raised area on a previously flat lesion, scaling, ulceration, bleeding or scabbing within the mole. Any mole that starts to demonstrate "asymmetry" should be examined promptly by a board certified dermatologist.     Border: Healthy moles tend to have discrete, even borders. The border of a melanoma often blends into the normal skin and does not sharply delineate the mole from normal skin. Any mole that starts to demonstrate "uneven borders" should be examined promptly by a board certified dermatologist.     Color: Healthy moles tend to be one color throughout. Melanomas tend to be made up of different colors ranging from dark black, blue, white, or red.   Any mole that demonstrates a color change should be examined promptly by a board certified dermatologist.     Diameter: Healthy moles tend to be smaller than 0.6 cm in size; an exception are "congenital nevi" that can be larger. Melanomas tend to grow and can often be greater than 0.6 cm (1/4 of an inch, or the size of a pencil eraser). This is only a guideline, and many normal moles may be larger than 0.6 cm without being unhealthy. Any mole that starts to change in size (small to bigger or bigger to smaller) should be examined promptly by a board certified dermatologist.     Evolving: Healthy moles tend to "stay the same."  Renata Downs may often show signs of change or evolution such as a change in size, shape, color, or elevation. Any mole that starts to itch, bleed, crust, burn, hurt, or ulcerate or demonstrate a change or evolution should be examined promptly by a board certified dermatologist.      Dysplastic Nevi  Dysplastic moles are moles that fit the ABCDE rules of melanoma but are not identified as melanomas when examined under the microscope. They may indicate an increased risk of melanoma in that person. If there is a family history of melanoma, most experts agree that the person may be at an increased risk for developing a melanoma. Experts still do not agree on what dysplastic moles mean in patients without a personal or family history of melanoma. Dysplastic moles are usually larger than common moles and have different colors within it with irregular borders. The appearance can be very similar to a melanoma. Biopsies of dysplastic moles may show abnormalities which are different from a regular mole. Melanoma  Malignant melanoma is a type of skin cancer that can be deadly if it spreads throughout the body. The incidence of melanoma in the [facility] is growing faster than any other cancer. Melanoma usually grows near the surface of the skin for a period of time, and then begins to grow deeper into the skin. Once it grows deeper into the skin, the risk of spread to other organs greatly increases.  Therefore, early detection and removal of a malignant melanoma may result in a better chance at a complete cure; removal after the tumor has spread may not be as effective, leading to worse clinical outcomes such as death. The true rate of nevus transformation into a melanoma is unknown. It has been estimated that the lifetime risk for any acquired melanocytic nevus on any 21year-old individual transforming into melanoma by age 80 is 0.03% (1 in 3,164) for men and 0.009% (1 in 10,800) for women. The appearance of a "new mole" remains one of the most reliable methods for identifying a malignant melanoma. Occasionally, melanomas appear as rapidly growing, blue-black, dome-shaped bumps within a previous mole or previous area of normal skin. Other times, melanomas are suspected when a mole suddenly appears or changes. Itching, burning, or pain in a pigmented lesion should increase suspicion, but most patients with early melanoma have no skin discomfort whatsoever. Melanoma can occur anywhere on the skin, including areas that are difficult for self-examination. Many melanomas are first noticed by other family members. Suspicious-looking moles may be removed for microscopic examination. You may be able to prevent death from melanoma by doing two simple things:    Try to avoid unnecessary sun exposure and protect your skin when it is exposed to the sun. People who live near the equator, people who have intermittent exposures to large amounts of sun, and people who have had sunburns in childhood or adolescence have an increased risk for melanoma. Sun sense and vigilant sun protection may be keys to helping to prevent melanoma. We recommend wearing UPF-rated sun protective clothing and sunglasses whenever possible and applying a moisturizer-sunscreen combination product (SPF 50+) such as Neutrogena Daily Defense to sun exposed areas of skin at least three times a day.     Have your moles regularly examined by a board certified dermatologist AND by yourself or a family member/friend at home. We recommend that you have your moles examined at least once a year by a board certified dermatologist.  Use your birthday as an annual reminder to have your "Birthday Suit" (I.e., your skin) examined; it is a nice birthday gift to yourself to know that your skin is healthy appearing! Additionally, at-home self examinations may be helpful for detecting a possible melanoma. Use the ABCDEs we discussed and check your moles once a month at home. SEBORRHEIC KERATOSIS  A seborrheic keratosis is a harmless warty spot that appears during adult life as a common sign of skin aging. Seborrheic keratoses can arise on any area of skin, covered or uncovered, with the usual exception of the palms and soles. They do not arise from mucous membranes. Seborrheic keratoses can have highly variable appearance. Seborrheic keratoses are extremely common. It has been estimated that over 90% of adults over the age of 61 years have one or more of them. They occur in males and females of all races, typically beginning to erupt in the 35s or 45s. They are uncommon under the age of 21 years. The precise cause of seborrhoeic keratoses is not known. Seborrhoeic keratoses are considered degenerative in nature. As time goes by, seborrheic keratoses tend to become more numerous. Some people inherit a tendency to develop a very large number of them; some people may have hundreds of them. The name "seborrheic keratosis" is misleading, because these lesions are not limited to a seborrhoeic distribution (scalp, mid-face, chest, upper back), nor are they formed from sebaceous glands, nor are they associated with sebum -- which is greasy. Seborrheic keratosis may also be called "SK," "Seb K," "basal cell papilloma," "senile wart," or "barnacle."      There is no easy way to remove multiple lesions on a single occasion.   Unless a specific lesion is "inflamed" and is causing pain or stinging/burning or is bleeding, most insurance companies do not authorize treatment. ANGIOMA ("CHERRY ANGIOMA")  Kincaid angiomas markedly increase in number from about the age of 36, so it has been estimated that 75% of people over 76years of age have them. Although they also called "senile angiomas," they can occur in young people too - 5% of adolescents have been found to have them. Cherry angiomas are very common in males and females of any age or race, with no difference in sexes or races affected. They are however more noticeable in white skin than in skin of colour. There may be a family history of similar lesions. Eruptive (very large number appearing in a short period of time) cherry angiomas have been rarely reported to be associated with internal malignancy and pregnancy. STATIS DERMATITIS ("VENOUS ECZEMA")    Assessment and Plan:  Based on a thorough discussion of this condition and the management approach to it (including a comprehensive discussion of the known risks, side effects and potential benefits of treatment), the patient (family) agrees to implement the following specific plan:  Triamcinolone Ointment Twice a day until resolved  Advised Support stockings    What is venous eczema? Venous eczema is a common form of eczema/dermatitis that affects one or both lower legs in association with venous insufficiency. It is also called gravitational dermatitis. Who gets venous eczema? Venous eczema is most often seen in middle-aged and elderly patients -- it is reported to affect 20% of those over 70 years. It is associated with:  History of deep venous thrombosis in an affected limb  History of cellulitis in an affected limb  Chronic swelling of the lower leg, aggravated by hot weather and prolonged standing  Varicose veins  Venous leg ulcers. What causes venous eczema? Venous eczema appears to be due to fluid collecting in the tissues and activation of the innate immune response.     Normally during walking the leg muscles pump blood upwards and valves in the veins prevent pooling. A clot in the deep leg veins (deep venous thrombosis or DVT) or varicose veins may damage the valves. As a result back pressure develops and fluid collects in the tissues. An inflammatory reaction occurs. What are the clinical features of venous eczema? Venous eczema can form discrete patches or become confluent and circumferential. Features include:  Itchy red, blistered and crusted plaques; or dry fissured and scaly plaques on one or both lower legs  Orange-brown macular pigmentation due to haemosiderin deposition  Atrophie luis (white irregular scars surrounded by red spots)  'Champagne bottle' shape of the lower leg -- narrowing at the ankles and induration (lipodermatosclerosis)    What are the complications of venous eczema? Impetiginisation -- secondary infection with Staphylococcus aureus resulting in yellowish crusts  Cellulitis -- infection with Streptococcus pyogenes: there may be redness, swelling, pain, fever, a red streak up the leg and swollen nodes in the groin  Secondary eczema -- the eczema spreads to other areas on the body  Contact allergy to one or more components of the ointments or creams used    How is venous eczema diagnosed? The diagnosis of venous eczema is clinical.  Patch tests may be undertaken if there is suspicion of contact allergy. What is the treatment for venous eczema? Reduce swelling in the leg  Don't stand for long periods. Take regular walks. Elevate your feet when sitting: if your legs are swollen they need to be above your hips to drain effectively. Elevate the foot of your bed overnight. During the acute phase of eczema, bandaging is important to reduce swelling. When eczema has settled, wear graduated compression socks or stockings long term. Fitted moderate to high compression socks can be obtained from a surgical supplies company.  Light compression using travel socks may be adequate, and these are easy to put on. They can be bought at pharmacies, travel and sports stores. More compression is obtained by wearing two pairs. Horse chestnut extract appears to be of benefit for at least some patients with venous disease. Treat the eczema  Dry up oozing patches with Condy's solution (potassium permanganate) or dilute vinegar on gauze as compresses. Oral antibiotics such as flucloxacillin may be prescribed for a secondary infection. Apply a prescribed topical steroid: start with a potent steroid cream applied accurately daily to the patches until they have flattened out. After a few days, change to a milder steroid cream (eg. hydrocortisone) until the itchy patches have resolved (maintenance treatment). Check with your doctor if you are using steroid creams for more than a few weeks. Overuse can thin the skin, but short courses of stronger preparations can be used from time to time if necessary to control dermatitis. Coal tar ointment may also help. Use a moisturizing cream frequently to keep the skin on the legs smooth and soft. If the skin is very scaly, urea cream may be especially effective. Protect your skin from injury: this can result in infection or ulceration that may be difficult to heal.    Treatment for varicose veins  Seek the opinion of a vascular surgeon regarding varicose veins  These can be treated surgically or sclerotherapy. Varicose veins may develop again after an apparently successful operation because venous disease is progressive. How can venous eczema be prevented? Venous eczema cannot be completely prevented but the number and severity of flare-ups can be reduced by the following measures.   Avoid prolonged standing or sitting with legs down  Wear compression socks or stockings  Avoid and treat leg swelling  Apply emollients frequently and regularly to dry skin  Avoid soap; use water alone or non-soap cleansers when bathing    What is the outlook for venous eczema? Venous eczema tends to be a recurring or chronic disorder lifelong. Treat recurrence promptly with topical steroids. EXCORIATIONS    Assessment and Plan:  Based on a thorough discussion of this condition and the management approach to it (including a comprehensive discussion of the known risks, side effects and potential benefits of treatment), the patient (family) agrees to implement the following specific plan:  Triamcinolone Ointment Twice a day until resolved     What is it? Compulsive skin picking, or excoriation disorder, is characterized by the repetitive picking of one's own skin to the point of causing open sores that may bleed and leave scarring. This condition is also known as dermatillomania, pathological skin picking and neurotic excoriation. Individuals may pick at healthy skin, minor skin irregularities (e.g., pimples or calluses), lesions, or scabs. This disorder is usually chronic, with periods of remission alternating with periods of greater symptom intensity. If untreated, skin-picking behaviors may come and go for weeks, months, or years at a time. It is common for individuals with this disorder to spend significant amounts of time, sometimes even several hours a day, on their picking behavior. Although any part of the body may be attacked, often the face is the targeted area. Currently, no specific cause has been identified for excoriation disorder. However, evidence demonstrates that the disorder is more common in individuals with obsessive-compulsive disorder and their parents, siblings or children than in the general population, suggesting that there is a genetic predisposition to the condition. Individuals with compulsive skin picking often have a co-existing psychiatric disorder. The most common co-existing psychiatric conditions are major depression and anxiety disorders, especially obsessive-compulsive disorder (OCD).  In one study, 52% of patients with compulsive skin picking were also diagnosed with OCD. An organic disease such as anemia or liver disease may also cause compulsive skin picking. In many sufferers of compulsive skin picking, skin picking is preceded or accompanied by a high level of tension, anxiety or stress and a strong urge to itch or scratch. Often certain events or situations trigger skin-picking episodes. For some, the act of skin picking provides a feeling of relief or pleasure. Skin-picking episodes can be a conscious response to anxiety or may be done as an unconscious habit. Compulsive skin picking appears to be more common in women than in men, and often starts in adolescence. It may also be associated with methamphetamine or cocaine abuse. Skin damage caused from compulsive skin picking can range from mild to extreme. Bleeding, bruising and secondary infections are not uncommon. In severe cases, patients may create wounds so large that they require hospitalized care. Compulsive skin picking often leads to permanent disfigurement, shame and social impairment. Sufferers will often try to hide the damaged caused to their skin by wearing make-up and/or clothes to cover the marks and scars. In extreme cases, they will avoid social situations to hide their condition from those around them. What treatment is available for excoriation? Evidence suggests that both medication and cognitive-behavioral therapy (CBT) may effectively reduce symptoms of excoriation disorder  Compulsive skin picking stemming from a psychological disorder is best treated with psychotherapy. When compulsive skin picking is generally an unconscious habit the treatment of choice is a form of cognitive behavior therapy called Habit Reversal Training (HRT). Medication: Successful treatment may include the use of selective serotonin reuptake inhibitors (SSRIs), which are antidepressants that also help reduce obsessive thoughts and compulsive behaviors.  The drugs of choice are the selective serotonin reuptake inhibitors (SSRIs) such as fluoxetine, paroxetine, sertraline and fluvoxamine. Acetylcysteine has also been reported to be effective. Cognitive-behavioral therapy (CBT): Cognitive-behavioral therapy helps individuals understand how their thoughts and behavior patterns are related in order to reduce repetitive behaviors. Individuals learn how to change their thoughts so that they can avoid picking at their skin.

## 2023-08-15 NOTE — PROGRESS NOTES
[facility] Note     Patient Name: Edis Guerin  Encounter Date: August 15, 2023     Have you been cared for by a [facility] D[facility]atologist in the last 3 years and, if so, which description applies to you? NO. I am considered a "new" patient and must complete all patient intake questions. I am FEMALE/of child-bearing potential.    REVIEW OF SYSTEMS:  Have you recently had or currently have any of the following? · Recent fever or chills? No  · Any non-healing wound? No  · Are you pregnant or planning to become pregnant? No  · Are you currently or planning to be nursing or breast feeding? No   PAST MEDICAL HISTORY:  Have you personally ever had or currently have any of the following? If "YES," then please provide more detail. · Skin cancer (such as Melanoma, Basal Cell Carcinoma, Squamous Cell Carcinoma? No  · Tuberculosis, HIV/AIDS, Hepatitis B or C: No  · Systemic Immunosuppression such as Diabetes, Biologic or Immunotherapy, Chemotherapy, Organ Transplantation, Bone Marrow Transplantation No  · Radiation Treatment YES, Breast Cancer - 2019   FAMILY HISTORY:  Any "first degree relatives" (parent, brother, sister, or child) with the following? • Skin Cancer, Pancreatic or Other Cancer? No   PATIENT EXPERIENCE:    • Do you want the Dermatologist to perform a COMPLETE skin exam today including a clinical examination under the "bra and underwear" areas? Yes  • If necessary, do we have your permission to call and leave a detailed message on your Preferred Phone number that includes your specific medical information?   Yes      No Known Allergies   Current Outpatient Medications:   •  aspirin 81 MG tablet, Take 81 mg by mouth daily, Disp: , Rfl:   •  Cetirizine HCl 10 MG CAPS, daily , Disp: , Rfl:   •  cholecalciferol (VITAMIN D3) 1,000 units tablet, Take 1 tablet by mouth 2 (two) times a day, Disp: , Rfl:   •  Docusate Sodium 100 MG capsule, Take 100 mg by mouth 2 (two) times a day, Disp: , Rfl:   •  escitalopram (LEXAPRO) 20 mg tablet, Take 1 tablet (20 mg total) by mouth daily, Disp: 90 tablet, Rfl: 3  •  hydrocortisone (PROCTOSOL HC) 2.5 % rectal cream, Insert into the rectum daily as needed (Hemorrhoids) (Patient taking differently: Insert into the rectum daily as needed (Hemorrhoids) As needed), Disp: 30 g, Rfl: 5  •  magnesium 30 MG tablet, Take 30 mg by mouth 2 (two) times a day, Disp: , Rfl:   •  Misc Natural Products (FIBER 7) POWD, Take by mouth daily , Disp: , Rfl:   •  Multiple Vitamins-Minerals (CENTRUM SILVER) tablet, Take by mouth daily , Disp: , Rfl:   •  nystatin (MYCOSTATIN) powder, Apply 1 application. topically as needed To affected area, Disp: , Rfl:   •  nystatin-triamcinolone (MYCOLOG-II) ointment, APPLY TO AFFECTED AREA TWICE A DAY, Disp: 45 g, Rfl: 1  •  Probiotic Product (PROBIOTIC PO), Take by mouth, Disp: , Rfl:   •  VITAMIN B COMPLEX-C PO, Take by mouth daily , Disp: , Rfl:   •  zinc gluconate 50 mg tablet, Take 50 mg by mouth in the morning. As needed. , Disp: , Rfl:   •  Diclofenac Sodium (VOLTAREN) 1 %, Apply 2 g topically 4 (four) times a day (Patient not taking: Reported on 8/1/2023), Disp: 200 g, Rfl: 3          • Whom besides the patient is providing clinical information about today's encounter?   o NO ADDITIONAL HISTORIAN (patient alone provided history)    Physical Exam and Assessment/Plan by Diagnosis:    Chief complaint: Patient is a 65 y/o female present today for a routine skin exam with no personal or family history of skin cancer. Today pt is concerned with a very itchy rash on the arms and back. Pt currently using Zyrtec to help with the ithcing. She was seen by Dr. Luis Sommers who states that it is not an internal allergy. 2. Patient also concerned with a dark pigmentation on the right shin, that started 2 months ago and is tender to touch.     MELANOCYTIC NEVI ("Moles")    Physical Exam:  • Anatomic Location Affected: Mostly on sun-exposed areas of the trunk and extremities  • Morphological Description:  Scattered, 1-4mm round to ovoid, symmetrical-appearing, even bordered, skin colored to dark brown macules/papules, mostly in sun-exposed areas  • Pertinent Positives:  • Pertinent Negatives: Additional History of Present Condition:  Present on exam    Assessment and Plan:  Based on a thorough discussion of this condition and the management approach to it (including a comprehensive discussion of the known risks, side effects and potential benefits of treatment), the patient (family) agrees to implement the following specific plan:  • Provided handout with information regarding the ABCDE's of moles   • Recommend routine skin exams every year   • Sun avoidance, protective clothing (known as UPF clothing), and the use of at least SPF 30 sunscreens is advised. Sunscreen should be reapplied every two hours when outside. SEBORRHEIC KERATOSIS; NON-INFLAMED    Physical Exam:  • Anatomic Location Affected:  scattered across trunk, extremities, face  • Morphological Description:  Flat and raised, waxy, smooth to warty textured, yellow to brownish-grey to dark brown to blackish, discrete, "stuck-on" appearing papules. • Pertinent Positives:  • Pertinent Negatives: Additional History of Present Condition:  Patient reports new bumps on the skin. Denies itch, burn, pain, bleeding or ulceration. Present constantly; nothing seems to make it worse or better. No prior treatment.       Assessment and Plan:  Based on a thorough discussion of this condition and the management approach to it (including a comprehensive discussion of the known risks, side effects and potential benefits of treatment), the patient (family) agrees to implement the following specific plan:  • Reassured benign      ANGIOMA ("CHERRY ANGIOMA")    Physical Exam:  • Anatomic Location: scattered across sun exposed areas of the trunk and extremities   • Morphologic Description: Firm red to reddish-blue discrete papules  • Pertinent Positives:  • Pertinent Negatives: Additional History of Present Condition:  Present on exam.     Assessment and Plan:  • Reassured benign    STASIS DERMATITIS ("VENOUS ECZEMA")    Physical Exam:  • Anatomic Location Affected:  Right Shin  • Morphological Description:  Hyperpigmented excoriated area with underlying varicosities  • Pertinent Positives:  • Pertinent Negatives: Additional History of Present Condition:  Present for several months    Assessment and Plan:  Based on a thorough discussion of this condition and the management approach to it (including a comprehensive discussion of the known risks, side effects and potential benefits of treatment), the patient (family) agrees to implement the following specific plan:  · Triamcinolone Ointment Twice a day until resolved  · Advised Support stockings    What is venous eczema? Venous eczema is a common form of eczema/dermatitis that affects one or both lower legs in association with venous insufficiency. It is also called gravitational dermatitis. Who gets venous eczema? Venous eczema is most often seen in middle-aged and elderly patients -- it is reported to affect 20% of those over 70 years. It is associated with:  • History of deep venous thrombosis in an affected limb  • History of cellulitis in an affected limb  • Chronic swelling of the lower leg, aggravated by hot weather and prolonged standing  • Varicose veins  • Venous leg ulcers. What causes venous eczema? Venous eczema appears to be due to fluid collecting in the tissues and activation of the innate immune response. Normally during walking the leg muscles pump blood upwards and valves in the veins prevent pooling. A clot in the deep leg veins (deep venous thrombosis or DVT) or varicose veins may damage the valves. As a result back pressure develops and fluid collects in the tissues. An inflammatory reaction occurs.     What are the clinical features of venous eczema? Venous eczema can form discrete patches or become confluent and circumferential. Features include:  • Itchy red, blistered and crusted plaques; or dry fissured and scaly plaques on one or both lower legs  • Orange-brown macular pigmentation due to haemosiderin deposition  • Atrophie luis (white irregular scars surrounded by red spots)  • 'Champagne bottle' shape of the lower leg -- narrowing at the ankles and induration (lipodermatosclerosis)    What are the complications of venous eczema? • Impetiginisation -- secondary infection with Staphylococcus aureus resulting in yellowish crusts  • Cellulitis -- infection with Streptococcus pyogenes: there may be redness, swelling, pain, fever, a red streak up the leg and swollen nodes in the groin  • Secondary eczema -- the eczema spreads to other areas on the body  • Contact allergy to one or more components of the ointments or creams used    How is venous eczema diagnosed? The diagnosis of venous eczema is clinical.  Patch tests may be undertaken if there is suspicion of contact allergy. What is the treatment for venous eczema? Reduce swelling in the leg  • Don't stand for long periods. • Take regular walks. • Elevate your feet when sitting: if your legs are swollen they need to be above your hips to drain effectively. • Elevate the foot of your bed overnight. • During the acute phase of eczema, bandaging is important to reduce swelling. • When eczema has settled, wear graduated compression socks or stockings long term. Fitted moderate to high compression socks can be obtained from a surgical supplies company. Light compression using travel socks may be adequate, and these are easy to put on. They can be bought at pharmacies, travel and sports stores. More compression is obtained by wearing two pairs. • Horse chestnut extract appears to be of benefit for at least some patients with venous disease.     Treat the eczema  • Dry up oozing patches with Condy's solution (potassium permanganate) or dilute vinegar on gauze as compresses. • Oral antibiotics such as flucloxacillin may be prescribed for a secondary infection. • Apply a prescribed topical steroid: start with a potent steroid cream applied accurately daily to the patches until they have flattened out. After a few days, change to a milder steroid cream (eg. hydrocortisone) until the itchy patches have resolved (maintenance treatment). Check with your doctor if you are using steroid creams for more than a few weeks. Overuse can thin the skin, but short courses of stronger preparations can be used from time to time if necessary to control dermatitis. Coal tar ointment may also help. • Use a moisturizing cream frequently to keep the skin on the legs smooth and soft. If the skin is very scaly, urea cream may be especially effective. • Protect your skin from injury: this can result in infection or ulceration that may be difficult to heal.    Treatment for varicose veins  • Seek the opinion of a vascular surgeon regarding varicose veins  • These can be treated surgically or sclerotherapy. • Varicose veins may develop again after an apparently successful operation because venous disease is progressive. How can venous eczema be prevented? Venous eczema cannot be completely prevented but the number and severity of flare-ups can be reduced by the following measures. • Avoid prolonged standing or sitting with legs down  • Wear compression socks or stockings  • Avoid and treat leg swelling  • Apply emollients frequently and regularly to dry skin  • Avoid soap; use water alone or non-soap cleansers when bathing    What is the outlook for venous eczema? Venous eczema tends to be a recurring or chronic disorder lifelong. Treat recurrence promptly with topical steroids.     EXCORIATIONS    Physical Exam:  • Anatomic Location Affected:  Back and Arms  • Morphological Description:  Erythematous patches  • Pertinent Positives: Itching  • Pertinent Negatives: Additional History of Present Condition:  Present for years    Assessment and Plan:  Based on a thorough discussion of this condition and the management approach to it (including a comprehensive discussion of the known risks, side effects and potential benefits of treatment), the patient (family) agrees to implement the following specific plan:  • Triamcinolone Ointment Twice a day until resolved     What is it? Compulsive skin picking, or excoriation disorder, is characterized by the repetitive picking of one's own skin to the point of causing open sores that may bleed and leave scarring. This condition is also known as dermatillomania, pathological skin picking and neurotic excoriation. Individuals may pick at healthy skin, minor skin irregularities (e.g., pimples or calluses), lesions, or scabs. This disorder is usually chronic, with periods of remission alternating with periods of greater symptom intensity. If untreated, skin-picking behaviors may come and go for weeks, months, or years at a time. It is common for individuals with this disorder to spend significant amounts of time, sometimes even several hours a day, on their picking behavior. Although any part of the body may be attacked, often the face is the targeted area. Currently, no specific cause has been identified for excoriation disorder. However, evidence demonstrates that the disorder is more common in individuals with obsessive-compulsive disorder and their parents, siblings or children than in the general population, suggesting that there is a genetic predisposition to the condition. Individuals with compulsive skin picking often have a co-existing psychiatric disorder. The most common co-existing psychiatric conditions are major depression and anxiety disorders, especially obsessive-compulsive disorder (OCD).  In one study, 52% of patients with compulsive skin picking were also diagnosed with OCD. An organic disease such as anemia or liver disease may also cause compulsive skin picking. In many sufferers of compulsive skin picking, skin picking is preceded or accompanied by a high level of tension, anxiety or stress and a strong urge to itch or scratch. Often certain events or situations trigger skin-picking episodes. For some, the act of skin picking provides a feeling of relief or pleasure. Skin-picking episodes can be a conscious response to anxiety or may be done as an unconscious habit. Compulsive skin picking appears to be more common in women than in men, and often starts in adolescence. It may also be associated with methamphetamine or cocaine abuse. Skin damage caused from compulsive skin picking can range from mild to extreme. Bleeding, bruising and secondary infections are not uncommon. In severe cases, patients may create wounds so large that they require hospitalized care. Compulsive skin picking often leads to permanent disfigurement, shame and social impairment. Sufferers will often try to hide the damaged caused to their skin by wearing make-up and/or clothes to cover the marks and scars. In extreme cases, they will avoid social situations to hide their condition from those around them. What treatment is available for excoriation? Evidence suggests that both medication and cognitive-behavioral therapy (CBT) may effectively reduce symptoms of excoriation disorder  Compulsive skin picking stemming from a psychological disorder is best treated with psychotherapy. When compulsive skin picking is generally an unconscious habit the treatment of choice is a form of cognitive behavior therapy called Habit Reversal Training (HRT). • Medication: Successful treatment may include the use of selective serotonin reuptake inhibitors (SSRIs), which are antidepressants that also help reduce obsessive thoughts and compulsive behaviors.  The drugs of choice are the selective serotonin reuptake inhibitors (SSRIs) such as fluoxetine, paroxetine, sertraline and fluvoxamine. Acetylcysteine has also been reported to be effective. • Cognitive-behavioral therapy (CBT): Cognitive-behavioral therapy helps individuals understand how their thoughts and behavior patterns are related in order to reduce repetitive behaviors. Individuals learn how to change their thoughts so that they can avoid picking at their skin.           Scribe Attestation    I,:  Jennifer Berrios am acting as a scribe while in the presence of the attending physician.:       I,:  Lakesha Bhatti MD personally performed the services described in this documentation    as scribed in my presence.:

## 2023-11-03 ENCOUNTER — APPOINTMENT (OUTPATIENT)
Age: 78
End: 2023-11-03
Payer: COMMERCIAL

## 2023-11-03 DIAGNOSIS — E78.5 BORDERLINE HYPERLIPIDEMIA: ICD-10-CM

## 2023-11-03 DIAGNOSIS — R73.03 PREDIABETES: Chronic | ICD-10-CM

## 2023-11-03 DIAGNOSIS — N39.0 FREQUENT UTI: ICD-10-CM

## 2023-11-03 DIAGNOSIS — D69.6 THROMBOCYTOPENIA (HCC): ICD-10-CM

## 2023-11-03 LAB
ANION GAP SERPL CALCULATED.3IONS-SCNC: 7 MMOL/L
BACTERIA UR QL AUTO: ABNORMAL /HPF
BILIRUB UR QL STRIP: NEGATIVE
BUN SERPL-MCNC: 25 MG/DL (ref 5–25)
CALCIUM SERPL-MCNC: 9.2 MG/DL (ref 8.4–10.2)
CHLORIDE SERPL-SCNC: 104 MMOL/L (ref 96–108)
CHOLEST SERPL-MCNC: 182 MG/DL
CLARITY UR: ABNORMAL
CO2 SERPL-SCNC: 30 MMOL/L (ref 21–32)
COLOR UR: ABNORMAL
CREAT SERPL-MCNC: 0.65 MG/DL (ref 0.6–1.3)
ERYTHROCYTE [DISTWIDTH] IN BLOOD BY AUTOMATED COUNT: 13.9 % (ref 11.6–15.1)
EST. AVERAGE GLUCOSE BLD GHB EST-MCNC: 131 MG/DL
GFR SERPL CREATININE-BSD FRML MDRD: 85 ML/MIN/1.73SQ M
GLUCOSE P FAST SERPL-MCNC: 103 MG/DL (ref 65–99)
GLUCOSE UR STRIP-MCNC: NEGATIVE MG/DL
HBA1C MFR BLD: 6.2 %
HCT VFR BLD AUTO: 42 % (ref 34.8–46.1)
HDLC SERPL-MCNC: 59 MG/DL
HGB BLD-MCNC: 14 G/DL (ref 11.5–15.4)
HGB UR QL STRIP.AUTO: NEGATIVE
KETONES UR STRIP-MCNC: NEGATIVE MG/DL
LDLC SERPL CALC-MCNC: 103 MG/DL (ref 0–100)
LEUKOCYTE ESTERASE UR QL STRIP: NEGATIVE
MCH RBC QN AUTO: 30.6 PG (ref 26.8–34.3)
MCHC RBC AUTO-ENTMCNC: 33.3 G/DL (ref 31.4–37.4)
MCV RBC AUTO: 92 FL (ref 82–98)
MUCOUS THREADS UR QL AUTO: ABNORMAL
NITRITE UR QL STRIP: NEGATIVE
NON-SQ EPI CELLS URNS QL MICRO: ABNORMAL /HPF
NONHDLC SERPL-MCNC: 123 MG/DL
PH UR STRIP.AUTO: 6.5 [PH]
PLATELET # BLD AUTO: 139 THOUSANDS/UL (ref 149–390)
PMV BLD AUTO: 11.5 FL (ref 8.9–12.7)
POTASSIUM SERPL-SCNC: 4.3 MMOL/L (ref 3.5–5.3)
PROT UR STRIP-MCNC: ABNORMAL MG/DL
RBC # BLD AUTO: 4.57 MILLION/UL (ref 3.81–5.12)
RBC #/AREA URNS AUTO: ABNORMAL /HPF
SODIUM SERPL-SCNC: 141 MMOL/L (ref 135–147)
SP GR UR STRIP.AUTO: 1.03 (ref 1–1.03)
TRIGL SERPL-MCNC: 99 MG/DL
UROBILINOGEN UR STRIP-ACNC: <2 MG/DL
WBC # BLD AUTO: 6.06 THOUSAND/UL (ref 4.31–10.16)
WBC #/AREA URNS AUTO: ABNORMAL /HPF

## 2023-11-03 PROCEDURE — 36415 COLL VENOUS BLD VENIPUNCTURE: CPT

## 2023-11-03 PROCEDURE — 83036 HEMOGLOBIN GLYCOSYLATED A1C: CPT

## 2023-11-03 PROCEDURE — 80061 LIPID PANEL: CPT

## 2023-11-03 PROCEDURE — 85027 COMPLETE CBC AUTOMATED: CPT

## 2023-11-03 PROCEDURE — 81001 URINALYSIS AUTO W/SCOPE: CPT

## 2023-11-03 PROCEDURE — 80048 BASIC METABOLIC PNL TOTAL CA: CPT

## 2023-11-05 LAB
BACTERIA UR CULT: ABNORMAL

## 2023-11-06 ENCOUNTER — TELEPHONE (OUTPATIENT)
Dept: UROLOGY | Facility: CLINIC | Age: 78
End: 2023-11-06

## 2023-11-06 DIAGNOSIS — N39.0 URINARY TRACT INFECTION WITHOUT HEMATURIA, SITE UNSPECIFIED: Primary | ICD-10-CM

## 2023-11-06 RX ORDER — AMOXICILLIN AND CLAVULANATE POTASSIUM 500; 125 MG/1; MG/1
1 TABLET, FILM COATED ORAL EVERY 12 HOURS SCHEDULED
Qty: 14 TABLET | Refills: 0 | Status: SHIPPED | OUTPATIENT
Start: 2023-11-06 | End: 2023-11-13

## 2023-11-06 NOTE — TELEPHONE ENCOUNTER
Called and left VM for the PT with results and Dalila Rainey recommendations.  Office number left for the PT if any questions.    ----- Message from Marilou Aranda PA-C sent at 11/6/2023  8:10 AM EST -----  Urine culture positive, abx sent to pharmacy

## 2023-11-16 ENCOUNTER — OFFICE VISIT (OUTPATIENT)
Age: 78
End: 2023-11-16
Payer: COMMERCIAL

## 2023-11-16 VITALS
TEMPERATURE: 97.5 F | BODY MASS INDEX: 47.01 KG/M2 | HEART RATE: 80 BPM | SYSTOLIC BLOOD PRESSURE: 128 MMHG | DIASTOLIC BLOOD PRESSURE: 74 MMHG | OXYGEN SATURATION: 97 % | HEIGHT: 59 IN | WEIGHT: 233.2 LBS

## 2023-11-16 DIAGNOSIS — D69.6 THROMBOCYTOPENIA (HCC): ICD-10-CM

## 2023-11-16 DIAGNOSIS — R73.03 PREDIABETES: Primary | Chronic | ICD-10-CM

## 2023-11-16 DIAGNOSIS — Z23 ENCOUNTER FOR IMMUNIZATION: ICD-10-CM

## 2023-11-16 DIAGNOSIS — F33.42 RECURRENT MAJOR DEPRESSION IN FULL REMISSION (HCC): ICD-10-CM

## 2023-11-16 DIAGNOSIS — M85.89 OSTEOPENIA OF MULTIPLE SITES: Chronic | ICD-10-CM

## 2023-11-16 DIAGNOSIS — E78.5 BORDERLINE HYPERLIPIDEMIA: ICD-10-CM

## 2023-11-16 DIAGNOSIS — R21 RASH: ICD-10-CM

## 2023-11-16 DIAGNOSIS — E66.01 MORBID OBESITY WITH BMI OF 45.0-49.9, ADULT (HCC): Chronic | ICD-10-CM

## 2023-11-16 PROCEDURE — G0008 ADMIN INFLUENZA VIRUS VAC: HCPCS | Performed by: INTERNAL MEDICINE

## 2023-11-16 PROCEDURE — 99214 OFFICE O/P EST MOD 30 MIN: CPT | Performed by: INTERNAL MEDICINE

## 2023-11-16 PROCEDURE — 90662 IIV NO PRSV INCREASED AG IM: CPT | Performed by: INTERNAL MEDICINE

## 2023-11-16 RX ORDER — ESCITALOPRAM OXALATE 20 MG/1
20 TABLET ORAL DAILY
Qty: 90 TABLET | Refills: 3 | Status: SHIPPED | OUTPATIENT
Start: 2023-11-16 | End: 2024-11-10

## 2023-11-16 RX ORDER — NYSTATIN 100000 [USP'U]/G
1 POWDER TOPICAL 2 TIMES DAILY PRN
Qty: 30 G | Refills: 3 | Status: SHIPPED | OUTPATIENT
Start: 2023-11-16

## 2023-11-16 NOTE — PROGRESS NOTES
Name: Jia Bolden      : 1945      MRN: [id number]  Encounter Provider: Ace Gonzáles DO  Encounter Date: 2023   Encounter department: [address] [facility]     1. Prediabetes    A1c remains elevated at 6.2 percent. Diet and lifestyle modifications are recommended. Avoid excess processed food and starches. Increase physical activity. We will repeat in the future. 2. Thrombocytopenia (HCC)    Chronically low platelets and this is stable at this time. No concerning bleeding episodes. We will continue to monitor.    - CBC; Future    3. Recurrent major depression in full remission ([address] )    In full remission, stable at this time. Continue escitalopram 20 mg.    - escitalopram (LEXAPRO) 20 mg tablet; Take 1 tablet (20 mg total) by mouth daily  Dispense: 90 tablet; Refill: 3    4. Morbid obesity with BMI of 45.0-49.9, adult ([address] [facility])    She needs to work on making aggressive diet and lifestyle modifications. 5. Borderline hyperlipidemia    Cholesterol is controlled at this time. Heart healthy diet is encouraged    - Lipid Panel with Direct LDL reflex; Future  - Comprehensive metabolic panel; Future    6. Osteopenia of multiple sites    She has a history of breast cancer. Patient is due for an updated bone density test.    - DXA bone density spine hip and pelvis; Future    7. Rash  - nystatin (MYCOSTATIN) powder; Apply 1 Application topically 2 (two) times a day as needed (rash) to affected area  Dispense: 30 g; Refill: 3    8. Encounter for immunization  - influenza vaccine, high-dose, PF 0.7 mL (FLUZONE HIGH-DOSE)          Return in about 6 months (around 2024) for Subsequent AWV. Subjective      Yi Foy is a 42-year-old female who presents for 6-month follow-up and to go over lab work. Urinalysis was positive for UTI. This was ordered by urology and she had antibiotics sent in by them. Osteopenia. She is going to therapy for her knee. Most of the time, it does not hurt, but at times it hurts when she is going up and down the stairs. Her last bone density was in 2018, which showed osteopenia. She is not using Voltaren gel. Hearing loss. She has trouble hearing. She is going to see a doctor this month to have her ears checked. She had hearing testing 10 years ago and had hearing aids but they made her ears itch. Past Medical History. She has a history of breast cancer. Review of systems:  The pertinent positive and negative findings are as noted in the HPI. Objective     /74   Pulse 80   Temp 97.5 °F (36.4 °C)   Ht 4' 11" (1.499 m)   Wt 106 kg (233 lb 3.2 oz)   SpO2 97%   BMI 47.10 kg/m²     Physical Exam  Constitutional:       General: She is not in acute distress. Appearance: She is obese. She is not ill-appearing. Cardiovascular:      Rate and Rhythm: Normal rate and regular rhythm. Heart sounds: No murmur heard. Pulmonary:      Effort: Pulmonary effort is normal. No respiratory distress. Breath sounds: No wheezing. Abdominal:      General: Bowel sounds are normal. There is no distension. Tenderness: There is no abdominal tenderness. Musculoskeletal:      Right lower leg: No edema. Left lower leg: No edema. Neurological:      Mental Status: She is alert. Results:  Patient had lab work done on 11/03/2023. A1c remains in the prediabetic range at 6.2%. Lipid panel showed total cholesterol 182, triglycerides 99, HDL 59, , platelets are chronically low and are stable at 139. Basic metabolic panel shows creatinine 0.65, eGFR 85, glucose 103. Urinalysis was positive for UTI. Transcribed for Savanah Fagan DO, by Dianne Donovan on 11/16/23 at 8:40 AM. Powered by [employer].

## 2024-01-03 ENCOUNTER — HOSPITAL ENCOUNTER (OUTPATIENT)
Dept: MRI IMAGING | Facility: HOSPITAL | Age: 79
Discharge: HOME | End: 2024-01-03
Attending: OTOLARYNGOLOGY

## 2024-01-03 DIAGNOSIS — H90.3 SENSORINEURAL HEARING LOSS (SNHL) OF BOTH EARS: ICD-10-CM

## 2024-01-03 DIAGNOSIS — H91.8X3 ASYMMETRICAL HEARING LOSS: ICD-10-CM

## 2024-02-15 ENCOUNTER — HOSPITAL ENCOUNTER (OUTPATIENT)
Dept: RADIOLOGY | Facility: HOSPITAL | Age: 79
End: 2024-02-15
Payer: COMMERCIAL

## 2024-02-15 ENCOUNTER — HOSPITAL ENCOUNTER (OUTPATIENT)
Dept: MRI IMAGING | Facility: HOSPITAL | Age: 79
End: 2024-02-15
Attending: OTOLARYNGOLOGY
Payer: COMMERCIAL

## 2024-02-15 PROCEDURE — A9585 GADOBUTROL INJECTION: HCPCS | Performed by: OTOLARYNGOLOGY

## 2024-02-15 PROCEDURE — G1004 CDSM NDSC: HCPCS

## 2024-02-15 PROCEDURE — 70553 MRI BRAIN STEM W/O & W/DYE: CPT

## 2024-02-15 RX ORDER — GADOBUTROL 604.72 MG/ML
10 INJECTION INTRAVENOUS
Status: COMPLETED | OUTPATIENT
Start: 2024-02-15 | End: 2024-02-15

## 2024-02-15 RX ADMIN — GADOBUTROL 10 ML: 604.72 INJECTION INTRAVENOUS at 16:00

## 2024-02-22 ENCOUNTER — NURSE TRIAGE (OUTPATIENT)
Age: 79
End: 2024-02-22

## 2024-02-22 ENCOUNTER — APPOINTMENT (OUTPATIENT)
Age: 79
End: 2024-02-22
Payer: COMMERCIAL

## 2024-02-22 DIAGNOSIS — N39.0 FREQUENT UTI: ICD-10-CM

## 2024-02-22 DIAGNOSIS — N39.0 FREQUENT UTI: Primary | ICD-10-CM

## 2024-02-22 DIAGNOSIS — N39.0 URINARY TRACT INFECTION WITHOUT HEMATURIA, SITE UNSPECIFIED: ICD-10-CM

## 2024-02-22 LAB
BACTERIA UR QL AUTO: ABNORMAL /HPF
BILIRUB UR QL STRIP: ABNORMAL
CAOX CRY URNS QL MICRO: ABNORMAL /HPF
CLARITY UR: CLEAR
COLOR UR: ABNORMAL
GLUCOSE UR STRIP-MCNC: NEGATIVE MG/DL
HGB UR QL STRIP.AUTO: NEGATIVE
KETONES UR STRIP-MCNC: NEGATIVE MG/DL
LEUKOCYTE ESTERASE UR QL STRIP: ABNORMAL
MUCOUS THREADS UR QL AUTO: ABNORMAL
NITRITE UR QL STRIP: POSITIVE
NON-SQ EPI CELLS URNS QL MICRO: ABNORMAL /HPF
PH UR STRIP.AUTO: 6 [PH]
PROT UR STRIP-MCNC: ABNORMAL MG/DL
RBC #/AREA URNS AUTO: ABNORMAL /HPF
SP GR UR STRIP.AUTO: 1.03 (ref 1–1.03)
UROBILINOGEN UR STRIP-ACNC: 4 MG/DL
WBC #/AREA URNS AUTO: ABNORMAL /HPF

## 2024-02-22 PROCEDURE — 81001 URINALYSIS AUTO W/SCOPE: CPT

## 2024-02-22 PROCEDURE — 87086 URINE CULTURE/COLONY COUNT: CPT

## 2024-02-22 NOTE — TELEPHONE ENCOUNTER
Regarding: UTI symptoms  ----- Message from AYESHA MOON sent at 2/22/2024  2:16 PM EST -----  Patient called rx refill line. States she has a UTI with burning , backpain, frequency. States the symptoms began last night. Patient requesting urine test and a antibiotic.

## 2024-02-22 NOTE — TELEPHONE ENCOUNTER
"Spoke to patient and she is having increased urinary frequency and urgency. Last urine culture from November was positive for an infection. Denies any abdominal pain but does experience back pain that was strong at pain scale of seven this morning but is better now. She is able to pass her urine with no difficulty. No visualized hematuria. Denies any N/V fevers or chills. No constipation. Urine orders were placed and ER precautions reviewed as well as giving her care advise to hydrate well with water and avoid bladder irritants and constipation. Please advise if an antibiotic is recommended and send to listed pharmacy. No scheduled upcoming appointments are on appointment desk.  Answer Assessment - Initial Assessment Questions  1. SYMPTOM: \"What's the main symptom you're concerned about?\" (e.g., frequency, incontinence)      Urinary frequency and urgency   2. ONSET: \"When did the  symptoms  start?\"      The past few days  3. PAIN: \"Is there any pain?\" If Yes, ask: \"How bad is it?\" (Scale: 1-10; mild, moderate, severe)      Mild but it was a 7 this morning   4. CAUSE: \"What do you think is causing the symptoms?\"      H/O recurrent uti's  5. OTHER SYMPTOMS: \"Do you have any other symptoms?\" (e.g., fever, flank pain, blood in urine, pain with urination)      Back pain no blood and no fevers    Protocols used: Urinary Symptoms-ADULT-[place]    "

## 2024-02-23 ENCOUNTER — TELEPHONE (OUTPATIENT)
Dept: UROLOGY | Facility: CLINIC | Age: 79
End: 2024-02-23

## 2024-02-23 DIAGNOSIS — N39.0 URINARY TRACT INFECTION WITHOUT HEMATURIA, SITE UNSPECIFIED: Primary | ICD-10-CM

## 2024-02-23 LAB — BACTERIA UR CULT: NORMAL

## 2024-02-23 RX ORDER — CEPHALEXIN 500 MG/1
500 CAPSULE ORAL EVERY 12 HOURS SCHEDULED
Qty: 14 CAPSULE | Refills: 0 | Status: SHIPPED | OUTPATIENT
Start: 2024-02-23 | End: 2024-03-01

## 2024-02-23 NOTE — TELEPHONE ENCOUNTER
Called and spoke to the PT with result and Manju GOFF recommendations. PT verbalized understanding.    ----- Message from Manju Blake PA-C sent at 2/23/2024  8:11 AM EST -----  UA concerning for infection. Abx sent to pharmacy given weekend. May need adjustment based on final culture

## 2024-02-26 ENCOUNTER — TELEPHONE (OUTPATIENT)
Dept: UROLOGY | Facility: CLINIC | Age: 79
End: 2024-02-26

## 2024-02-26 NOTE — TELEPHONE ENCOUNTER
Pt returned call and informed her per provider to d/c abx because urine culture is negative     Pt verbalized understanding

## 2024-02-26 NOTE — TELEPHONE ENCOUNTER
[facility] providing office number.     If pt calls back please relay Manju GOFF message.     Urine culture is negative, can d/c abx     ----- Message from Manju Blake PA-C sent at 2/26/2024 11:45 AM EST -----  Urine culture is negative, can d/c abx

## 2024-03-25 ENCOUNTER — NURSE TRIAGE (OUTPATIENT)
Age: 79
End: 2024-03-25

## 2024-03-25 DIAGNOSIS — R39.9 UTI SYMPTOMS: Primary | ICD-10-CM

## 2024-03-25 NOTE — TELEPHONE ENCOUNTER
Patient of Manju WATTS, last seen 8/1/2023    Patient c/o that here sxs started again 1 week ago, Burning with urination and urinary frequency. Also c/o of vaginal itching and vaginal burning. C/o of pressure in her vagina. Patient states she has an appt coming up with GYN.    Patient took AZO this morning, UA and UCX ordered, please advise when you want urine testing to be completed.    Patient states she felt better when on abx last month but was told to stop since negative culture, which she had done.    Patient unsure how much water she drinks daily. Does states Ibuprofen has also been effective for pain.    Additional Information   Negative: The patient has severe uncontrolled pain   Negative: The patient has a fever of 101 or higher   Negative: The patient has persistent vomiting    Answer Questions - Initial Assessment  When did your symptoms start: 1 week ago    Is burning with every void: no, intermittent    Do you have any other urinary symptoms, urinary frequency, urgency, incontinence: yes frequency and incontinent    Have you become unable to urinate: No: I do not feel as though I am retaining urine    Have you seen blood in your urine: no    Do you have any abdominal pain or flank pain: no    Do you have a fever of 101 or higher: no    Do you have a history of urinary tract infections: Yes:     Have you had any recent urine testing: yes 2/26/24    Protocols used: Dysuria

## 2024-03-25 NOTE — TELEPHONE ENCOUNTER
Called and left VM for the PT to get testing tomorrow morning. Office number left for the PT if PT has any questions.    Waiting for Culture do be obtained.

## 2024-03-26 ENCOUNTER — APPOINTMENT (OUTPATIENT)
Age: 79
End: 2024-03-26
Payer: COMMERCIAL

## 2024-03-26 DIAGNOSIS — R39.9 UTI SYMPTOMS: ICD-10-CM

## 2024-03-26 LAB
BACTERIA UR QL AUTO: ABNORMAL /HPF
BILIRUB UR QL STRIP: NEGATIVE
CAOX CRY URNS QL MICRO: ABNORMAL /HPF
CLARITY UR: ABNORMAL
COLOR UR: YELLOW
GLUCOSE UR STRIP-MCNC: NEGATIVE MG/DL
HGB UR QL STRIP.AUTO: NEGATIVE
KETONES UR STRIP-MCNC: NEGATIVE MG/DL
LEUKOCYTE ESTERASE UR QL STRIP: ABNORMAL
MUCOUS THREADS UR QL AUTO: ABNORMAL
NITRITE UR QL STRIP: NEGATIVE
NON-SQ EPI CELLS URNS QL MICRO: ABNORMAL /HPF
PH UR STRIP.AUTO: 6 [PH]
PROT UR STRIP-MCNC: NEGATIVE MG/DL
RBC #/AREA URNS AUTO: ABNORMAL /HPF
SP GR UR STRIP.AUTO: 1.02 (ref 1–1.03)
UROBILINOGEN UR STRIP-ACNC: 2 MG/DL
WBC #/AREA URNS AUTO: ABNORMAL /HPF

## 2024-03-26 PROCEDURE — 81001 URINALYSIS AUTO W/SCOPE: CPT

## 2024-03-26 PROCEDURE — 87086 URINE CULTURE/COLONY COUNT: CPT

## 2024-03-27 LAB — BACTERIA UR CULT: NORMAL

## 2024-03-28 ENCOUNTER — NURSE TRIAGE (OUTPATIENT)
Age: 79
End: 2024-03-28

## 2024-03-28 NOTE — TELEPHONE ENCOUNTER
"Yi called with concern for vaginal itching and burning type pain. Symptoms have increased over the past week.  Saw PCP and r/o UTI.  Using monistat with minimal relief. Denies vaginal discharge or odor.  Provided next available appt for 4/11 in [facility].  In meantime encouraged wash with mild soap, pat dry-do not rub. Avoid tight clothing, keep open to air at bedtime. Utilize aquaphor to external vaginal tissues to provide comfort and barrier to urine or vaginal discharge.   Reason for Disposition   Vaginal dryness or itching and nearing menopause or after menopause    Answer Assessment - Initial Assessment Questions  1. SYMPTOM: \"What's the main symptom you're concerned about?\" (e.g., pain, itching, dryness)      Vaginal itching and burning  2. LOCATION: \"Where is the  itch/burning located?\" (e.g., inside/outside, left/right)      internal  3. ONSET: \"When did the  irritation  start?\"      One week ago  4. PAIN: \"Is there any pain?\" If Yes, ask: \"How bad is it?\" (Scale: 1-10; mild, moderate, severe)      Burning pain  5. ITCHING: \"Is there any itching?\" If Yes, ask: \"How bad is it?\" (Scale: 1-10; mild, moderate, severe)      moderate  6. CAUSE: \"What do you think is causing the discharge?\" \"Have you had the same problem before? What happened then?\"      Denies vaginal discharge, denies odor, ongoing urinary incontinence  7. OTHER SYMPTOMS: \"Do you have any other symptoms?\" (e.g., fever, itching, vaginal bleeding, pain with urination, injury to genital area, vaginal foreign body)      Denies fever, initially thought she had UTI-PCP tested ua and negative  8. PREGNANCY: \"Is there any chance you are pregnant?\" \"When was your last menstrual period?\"      Post menopausal    Protocols used: Vaginal Symptoms-ADULT-[place]    "

## 2024-04-09 NOTE — PROGRESS NOTES
Diagnoses and all orders for this visit:    Yeast infection  -     POCT wet mount  -     fluconazole (DIFLUCAN) 150 mg tablet; Take 1 tablet (150 mg total) by mouth once for 1 dose  -     nystatin-triamcinolone (MYCOLOG-II) ointment; Apply topically 2 (two) times a day 2 times daily or as needed for itching    Other orders  -     Multiple Vitamin (STRESS/BIOTIN PO); Take by mouth      Results for orders placed or performed in visit on 04/11/24   POCT wet mount   Result Value Ref Range    Yeast, Wet Prep pos     pH 5     Whiff Test neg     Clue Cells neg     Trich, Wet Prep neg      Return for further management and possible vaginal estrogen treatment     Reviewed perineal hygiene and products to avoid.  Reviewed medications and usage instructions   Call if no symptom improvement, all questions answered, return for annual.     Yi Foy is a 79 y.o. female here for vaginal complaints. She reports vaginal irritation, itching and burning, on antibiotic 3 weeks ago for a UTI. Used monistat 7 days wit some relief   Prior to this episode she has had intermittent on and off itching but nothing to this degree.  Leaks urine daily , wears pads daily   Not sexually active   Denies any treatments tried.   Denies fever, pelvic pain or dyspareunia.   Vitals:    04/11/24 1048   BP: 112/82   BP Location: Left arm   Patient Position: Sitting   Cuff Size: Standard   Weight: 107 kg (236 lb 3.2 oz)     Body mass index is 47.71 kg/m².  No Known Allergies    Patient Active Problem List    Diagnosis Date Noted    Recurrent major depression in full remission ([facility]) 05/04/2023    History of breast cancer 03/31/2022    Thrombocytopenia ([facility]) 02/23/2021    Other irritable bowel syndrome 06/25/2020    CASEY (obstructive sleep apnea)     Vitamin D deficiency 07/10/2018    Borderline hyperlipidemia 05/10/2017    Osteopenia of multiple sites 08/24/2016    Morbid obesity with BMI of 45.0-49.9, adult ([facility]) 03/09/2016    Prediabetes 01/16/2014      Past Medical History:   Diagnosis Date    Anxiety     Arthritis     Benign neoplasm of skin     Breast cancer (HCC)     Bursitis     Right hip    Cancer (HCC) 2019    Depression     Diverticulitis     Frequency of urination     HL (hearing loss)     Hypercholesterolemia     IBS (irritable bowel syndrome)     Lyme disease     Morbid obesity (HCC)     Partial small bowel obstruction (HCC)     Prediabetes     Sleep apnea 2018    Small bowel obstruction (HCC) 2021    Urinary tract infection 2022    Vitamin D deficiency      Past Surgical History:   Procedure Laterality Date    APPENDECTOMY  1950    BREAST LUMPECTOMY Right     In spring of 2020 as per pt      SECTION      COLECTOMY      COLONOSCOPY      COMPLETE. ,     HERNIA REPAIR      SMALL INTESTINE SURGERY       Social History     Tobacco Use    Smoking status: Never     Passive exposure: Never    Smokeless tobacco: Never   Vaping Use    Vaping status: Never Used   Substance Use Topics    Alcohol use: No    Drug use: No       Current Outpatient Medications:     ascorbic acid (VITAMIN C) 500 mg tablet, Take 1 tablet by mouth every morning, Disp: , Rfl:     aspirin 81 MG tablet, Take 81 mg by mouth daily, Disp: , Rfl:     cholecalciferol (VITAMIN D3) 1,000 units tablet, Take 1 tablet by mouth 2 (two) times a day, Disp: , Rfl:     Docusate Sodium 100 MG capsule, Take 100 mg by mouth 2 (two) times a day 1 time per day, Disp: , Rfl:     escitalopram (LEXAPRO) 20 mg tablet, Take 1 tablet (20 mg total) by mouth daily, Disp: 90 tablet, Rfl: 3    fluconazole (DIFLUCAN) 150 mg tablet, Take 1 tablet (150 mg total) by mouth once for 1 dose, Disp: 1 tablet, Rfl: 0    magnesium 30 MG tablet, Take 30 mg by mouth 2 (two) times a day, Disp: , Rfl:     Misc Natural Products (FIBER 7) POWD, Take by mouth daily , Disp: , Rfl:     Multiple Vitamin (STRESS/BIOTIN PO), Take by mouth, Disp: , Rfl:     nystatin (MYCOSTATIN) powder, Apply 1  Application topically 2 (two) times a day as needed (rash) to affected area, Disp: 30 g, Rfl: 3    nystatin-triamcinolone (MYCOLOG-II) ointment, Apply topically 2 (two) times a day 2 times daily or as needed for itching, Disp: 30 g, Rfl: 0    Probiotic Product (PROBIOTIC PO), Take by mouth, Disp: , Rfl:     VITAMIN B COMPLEX-C PO, Take by mouth daily , Disp: , Rfl:     zinc gluconate 50 mg tablet, Take 50 mg by mouth in the morning. As needed., Disp: , Rfl:       OB History    Para Term  AB Living   2 2 2     2   SAB IAB Ectopic Multiple Live Births           2      # Outcome Date GA Lbr Jaziel/2nd Weight Sex Delivery Anes PTL Lv   2 Term            1 Term                Review of Systems   Constitutional: Negative for chills, fatigue, fever and unexpected weight change.   Respiratory: Negative for shortness of breath.    Gastrointestinal: Negative abdominal pain, constipation and diarrhea.   Genitourinary: Negative for difficulty urinating, dysuria and hematuria.     Physical Exam   Constitutional: Appears well-developed and well-nourished. No distress. Alert and oriented.  HENT: Atraumatic, Normocephalic.  Conjunctivae clear  Neck: Normal range of motion.   Pulmonary: Effort normal.  Abdominal: Soft. Negative for pain, tenderness or mass  Musculoskeletal: Normal ROM  Skin: Warm & Dry  Psychological: Normal mood, thought content, behavior & judgement       Pelvic exam was performed with patient supine, lithotomy position.   Exam consistent with postmenopausal atrophy  Labia: Right: Negative rash, tenderness, lesion or injury               Left: Negative rash, tenderness, lesion or injury   Urethral meatus:  Negative for  tenderness, inflammation or discharge.   Uterus: not deviated, enlarged, fixed or tender.   Cervix: No CMT, no discharge or friability.   Right adnexa: no mass, no tenderness and no fullness.  Left adnexa: no mass, no tenderness and no fullness.   Vagina: No erythema, tenderness, masses,  or foreign body in the vagina. No signs of injury around the vagina. No unusual vaginal discharge   Perineum without lesions, signs of injury, erythema or swelling.  Inguinal Canal:        Right: No inguinal adenopathy or hernia present.        Left: No inguinal adenopathy or hernia present.

## 2024-04-11 ENCOUNTER — OFFICE VISIT (OUTPATIENT)
Dept: OBGYN CLINIC | Facility: CLINIC | Age: 79
End: 2024-04-11
Payer: COMMERCIAL

## 2024-04-11 VITALS — SYSTOLIC BLOOD PRESSURE: 112 MMHG | DIASTOLIC BLOOD PRESSURE: 82 MMHG | BODY MASS INDEX: 47.71 KG/M2 | WEIGHT: 236.2 LBS

## 2024-04-11 DIAGNOSIS — B37.9 YEAST INFECTION: Primary | ICD-10-CM

## 2024-04-11 LAB
BV WHIFF TEST VAG QL: ABNORMAL
CLUE CELLS SPEC QL WET PREP: ABNORMAL
PH SMN: 5 [PH]
T VAGINALIS VAG QL WET PREP: ABNORMAL
YEAST VAG QL WET PREP: ABNORMAL

## 2024-04-11 PROCEDURE — 99213 OFFICE O/P EST LOW 20 MIN: CPT | Performed by: OBSTETRICS & GYNECOLOGY

## 2024-04-11 PROCEDURE — 87210 SMEAR WET MOUNT SALINE/INK: CPT | Performed by: OBSTETRICS & GYNECOLOGY

## 2024-04-11 RX ORDER — NYSTATIN AND TRIAMCINOLONE ACETONIDE 100000; 1 [USP'U]/G; MG/G
OINTMENT TOPICAL 2 TIMES DAILY
Qty: 30 G | Refills: 0 | Status: SHIPPED | OUTPATIENT
Start: 2024-04-11

## 2024-04-11 RX ORDER — FLUCONAZOLE 150 MG/1
150 TABLET ORAL ONCE
Qty: 1 TABLET | Refills: 0 | Status: SHIPPED | OUTPATIENT
Start: 2024-04-11 | End: 2024-04-11

## 2024-04-11 NOTE — PATIENT INSTRUCTIONS
Vaginal Estrogen Replacement Therapy    What is Estrogen vaginal cream?   Estrogen is a female hormone which controls many processes in the female   body. During menopause, estrogen production slows down and then stops.   Estrogen vaginal cream is supplied as a brand name of either Estrace or   Premarin    What is Estrogen cream used for?   Estrogen vaginal cream is used to decrease menopause symptoms such as   vaginal dryness, burning, and itching of the vaginal area. It also may reduce   symptoms of urinary urgency and irritation with urination.     How do I apply the Estrogen cream?   Estrogen cream comes in a tube with an applicator. The applicator is used to   insert the cream into your vagina. Your doctor will tell you how often to use the   cream. Generally you will use the cream daily for several weeks and then   decrease use to 3 times a week. It is best to use at bedtime so that there is less   leakage of the cream. The packaging will have specific instructions for applying   the cream and pictures to help you locate the vagina. General instructions are   as follows:   ? Wash your hands with warm water and soap.   ? Remove the cap from the tube and attach the end of the applicator onto   the tube.   ? Squeeze from the bottom of the tube to get the prescribed amount of   cream into the applicator. Use the measurement markings on the   applicator to make sure you have the correct dose.   ? Remove the applicator from the tube.   ? Locate the opening to your vagina.   ? Insert the applicator into the vagina (like using a tampon), and push on   the plunger of the applicator to insert the medication. Then slowly   withdraw the applicator.   ? Clean the applicator with warm water and soap. It is best to use liquid   soap as bar soap can leave a film on the applicator.   ? Wash hands with warm water and soap.     What are side effects of estrogen vaginal cream?   Side effects may include headache, breast pain,  irregular vaginal bleeding or   spotting, stomach cramps or bloating, nausea and vomiting, or hair loss. Some   women may also notice vaginal burning, irritation, itching, or discharge. If you   experience any of these side effects, you should call your doctor.     Safety Concerns   If you have a history of breast cancer, please ask your oncologist before   starting estrogen therapy. Although topical application of estrogen has less   absorption than oral supplements, there may be a slight increased risk of blood   clots with this medication.          Perineal Hygiene      Your vaginal naturally takes care of its self, it is a self washing system, the less you mess the healthier it will be     No soaps or feminine wash to the vulva, these products can cause dermitis, bacterial infections and other vulvar problems.   Use only water to cleanse, or water with Dove or Dove Sensitive Skin Bar soap if necessary.    No scented lotions or products are advised in or near your vulva.    Use only coconut oil for moisture if needed.  No douching this may cause imbalance in your vaginal PH and further issues.    If you wear panty liners, you may apply a thin coating of Vaseline, A&D ointment or coconut oil to the vulvar tissues as a skin barrier     Cotton underware, loose fitting clothing  Only perfume-free, dye-free laundry detergent, use a second rinse cycle   Avoid fabric softeners/dryer sheets.       Your partner should avoid the same products as well.       Over the counter probiotic to restore vaginal sammi may be helpful as well, take daily.       You may also look into Boric Acid vaginal suppositories to restore vaginal PH balance for up to 2 weeks as directed on the box. You may not use these if you are pregnant      For vaginal dryness:      You may use:     Coconut oil (organic, pure, unscented) as needed for moisture or lubrication. ( Do not use if allergic)       Replens moisture restore external comfort gel daily  ( use as directed on the box)        Replens long lasting vaginal moisturizer  ( use as directed on the box)         For Vaginal Lubrication:          You may use:     Coconut oil (organic, pure, unscented) as a lubricant or another scent-free lubricant (Astroglide, Uberlube) if needed.  Do not use coconut oil or silicone if using a condom as this may break down the integrity of the condom and cause an unplanned pregnancy              Do not use coconut oil if allergic               Replens silky smooth lubricant, premium silicone based lubricant for intercourse. ( use as directed, a small amount will provide an enhanced natural feeling)     Any premium over the counter vaginal lubricant water or silicone based. Silicone based will have more staying power.       Vaginal Atrophy   AMBULATORY CARE:   Vaginal atrophy  is a condition that causes thinning, drying, and inflammation of vaginal tissue. This condition is caused by decreased levels of estrogen (a female sex hormone). Vaginal atrophy can increase your risk for vaginal and urinary tract infections. Vaginal atrophy can worsen over time if not treated.   Common signs and symptoms include the following:   Vaginal dryness, itching, and burning    Vaginal discharge    Pain or discomfort during sex    Light bleeding after sex    Burning during urination    Frequent, sudden, strong urges to urinate    Urinary incontinence (loss of control of your bladder)    Contact your healthcare provider if:   You have a foul-smelling odor coming from your vagina.     You have a thick, cheese-like discharge from your vagina.     You have itching, swelling, or redness in your vagina.     You have pain or burning when you urinate.     Your urine smells bad.     Your symptoms do not improve, or they get worse.     You have questions or concerns about your condition or care.    Treatment:   Over-the counter vaginal moisturizers  can help reduce dryness. Your healthcare provider may  recommend that you use a vaginal moisturizer several times each week and during sex. Only use creams that are made for vaginal use. Do  not  use petroleum jelly. Lubricants can be used during sex to decrease pain and discomfort.     Estrogen  may help decrease dryness. It may also lower your risk of vaginal infections if you are going through menopause. It can also help to relieve urinary symptoms. Estrogen may be prescribed in the form of a cream, tablet, or ring. These medicines can be applied or inserted into the vagina. Estrogen can also be prescribed in the form of a pill.    Follow up with your doctor as directed:  Write down your questions so you remember to ask them during your visits.  © Copyright [employer] 2023 Information is for End User's use only and may not be sold, redistributed or otherwise used for commercial purposes.  The above information is an  only. It is not intended as medical advice for individual conditions or treatments. Talk to your doctor, nurse or pharmacist before following any medical regimen to see if it is safe and effective for you.    Yeast Infection   AMBULATORY CARE:   A yeast infection , or vaginal candidiasis, is a common vaginal infection. A yeast infection is caused by a fungus, or yeast-like germ. Fungi are normally found in your vagina. Too many fungi can cause an infection.  Common signs and symptoms:   Thick, white, cheese-like discharge from your vagina    Itching, swelling, and redness in your vagina    Pain or burning when you urinate    Pain during sexual intercourse    Call your doctor or gynecologist if:   You have a fever and chills.    You develop abdominal or pelvic pain.    Your discharge is bloody and it is not your monthly period.    Your signs and symptoms get worse, even after treatment.    You have questions or concerns about your condition or care.    Treatment for a yeast infection  includes medicines to treat the fungal infection and  decrease inflammation. The medicine may be a pill, cream, ointment, or vaginal tablet or suppository.  Keep your vagina healthy:   Clean your genital area with mild soap and warm water each day.  Do not get soap inside your vagina. Gently dry the area after washing. Do not use hot tubs. The heat and moisture from hot tubs can increase your risk for another yeast infection.    Always wipe from front to back  after you use the toilet. This prevents spreading bacteria from your rectal area into your vagina.    Do not wear tight-fitting clothes or undergarments  for long periods of time. Wear cotton underwear during the day. Cotton helps keep your genital area dry and does not hold in warmth or moisture. Do not wear underwear at night.    Do not douche  or use feminine hygiene sprays or bubble bath. Do not use pads or tampons that are scented, or colored or perfumed toilet paper.    Do not have sex until your symptoms go away.  Have your partner wear a condom until you complete your course of medication.    Ask your healthcare provider about birth control options if necessary.  Condoms have latex and diaphragms have gel that kills sperm. Both of these may irritate your genital area.    Follow up with your doctor or gynecologist as directed:  Write down your questions so you remember to ask them during your visits.  © Copyright [employer] 2023 Information is for End User's use only and may not be sold, redistributed or otherwise used for commercial purposes.  The above information is an  only. It is not intended as medical advice for individual conditions or treatments. Talk to your doctor, nurse or pharmacist before following any medical regimen to see if it is safe and effective for you.

## 2024-04-12 ENCOUNTER — TELEPHONE (OUTPATIENT)
Age: 79
End: 2024-04-12

## 2024-04-12 ENCOUNTER — NURSE TRIAGE (OUTPATIENT)
Age: 79
End: 2024-04-12

## 2024-04-12 NOTE — TELEPHONE ENCOUNTER
Patient called to get clarification on her diflucan rx.  Instructions given from rx script and all questions answered.  Patient voiced appreciation for call.

## 2024-05-16 ENCOUNTER — APPOINTMENT (OUTPATIENT)
Age: 79
End: 2024-05-16
Payer: COMMERCIAL

## 2024-05-16 DIAGNOSIS — D69.6 THROMBOCYTOPENIA (HCC): ICD-10-CM

## 2024-05-16 DIAGNOSIS — E78.5 BORDERLINE HYPERLIPIDEMIA: ICD-10-CM

## 2024-05-16 LAB
ALBUMIN SERPL BCP-MCNC: 3.7 G/DL (ref 3.5–5)
ALP SERPL-CCNC: 49 U/L (ref 34–104)
ALT SERPL W P-5'-P-CCNC: 15 U/L (ref 7–52)
ANION GAP SERPL CALCULATED.3IONS-SCNC: 7 MMOL/L (ref 4–13)
AST SERPL W P-5'-P-CCNC: 24 U/L (ref 13–39)
BILIRUB SERPL-MCNC: 0.51 MG/DL (ref 0.2–1)
BUN SERPL-MCNC: 23 MG/DL (ref 5–25)
CALCIUM SERPL-MCNC: 8.7 MG/DL (ref 8.4–10.2)
CHLORIDE SERPL-SCNC: 106 MMOL/L (ref 96–108)
CHOLEST SERPL-MCNC: 171 MG/DL
CO2 SERPL-SCNC: 28 MMOL/L (ref 21–32)
CREAT SERPL-MCNC: 0.63 MG/DL (ref 0.6–1.3)
ERYTHROCYTE [DISTWIDTH] IN BLOOD BY AUTOMATED COUNT: 13.9 % (ref 11.6–15.1)
GFR SERPL CREATININE-BSD FRML MDRD: 85 ML/MIN/1.73SQ M
GLUCOSE P FAST SERPL-MCNC: 106 MG/DL (ref 65–99)
HCT VFR BLD AUTO: 41.8 % (ref 34.8–46.1)
HDLC SERPL-MCNC: 48 MG/DL
HGB BLD-MCNC: 13.7 G/DL (ref 11.5–15.4)
LDLC SERPL CALC-MCNC: 90 MG/DL (ref 0–100)
MCH RBC QN AUTO: 30.1 PG (ref 26.8–34.3)
MCHC RBC AUTO-ENTMCNC: 32.8 G/DL (ref 31.4–37.4)
MCV RBC AUTO: 92 FL (ref 82–98)
PLATELET # BLD AUTO: 127 THOUSANDS/UL (ref 149–390)
PMV BLD AUTO: 11.5 FL (ref 8.9–12.7)
POTASSIUM SERPL-SCNC: 3.9 MMOL/L (ref 3.5–5.3)
PROT SERPL-MCNC: 6.2 G/DL (ref 6.4–8.4)
RBC # BLD AUTO: 4.55 MILLION/UL (ref 3.81–5.12)
SODIUM SERPL-SCNC: 141 MMOL/L (ref 135–147)
TRIGL SERPL-MCNC: 166 MG/DL
WBC # BLD AUTO: 5.87 THOUSAND/UL (ref 4.31–10.16)

## 2024-05-16 PROCEDURE — 80061 LIPID PANEL: CPT

## 2024-05-16 PROCEDURE — 80053 COMPREHEN METABOLIC PANEL: CPT

## 2024-05-16 PROCEDURE — 85027 COMPLETE CBC AUTOMATED: CPT

## 2024-05-16 PROCEDURE — 36415 COLL VENOUS BLD VENIPUNCTURE: CPT

## 2024-05-21 ENCOUNTER — OFFICE VISIT (OUTPATIENT)
Age: 79
End: 2024-05-21
Payer: COMMERCIAL

## 2024-05-21 ENCOUNTER — OFFICE VISIT (OUTPATIENT)
Dept: OBGYN CLINIC | Facility: CLINIC | Age: 79
End: 2024-05-21
Payer: COMMERCIAL

## 2024-05-21 VITALS
TEMPERATURE: 96.8 F | HEART RATE: 95 BPM | OXYGEN SATURATION: 95 % | SYSTOLIC BLOOD PRESSURE: 142 MMHG | BODY MASS INDEX: 50.13 KG/M2 | HEIGHT: 58 IN | WEIGHT: 238.8 LBS | DIASTOLIC BLOOD PRESSURE: 78 MMHG

## 2024-05-21 VITALS
BODY MASS INDEX: 49.96 KG/M2 | DIASTOLIC BLOOD PRESSURE: 76 MMHG | HEIGHT: 58 IN | SYSTOLIC BLOOD PRESSURE: 140 MMHG | WEIGHT: 238 LBS

## 2024-05-21 DIAGNOSIS — L30.9 VULVAR DERMATITIS: ICD-10-CM

## 2024-05-21 DIAGNOSIS — R73.03 PREDIABETES: ICD-10-CM

## 2024-05-21 DIAGNOSIS — D69.6 THROMBOCYTOPENIA (HCC): ICD-10-CM

## 2024-05-21 DIAGNOSIS — N39.0 FREQUENT UTI: Primary | ICD-10-CM

## 2024-05-21 DIAGNOSIS — E66.01 MORBID OBESITY WITH BMI OF 45.0-49.9, ADULT (HCC): ICD-10-CM

## 2024-05-21 DIAGNOSIS — E78.5 BORDERLINE HYPERLIPIDEMIA: ICD-10-CM

## 2024-05-21 DIAGNOSIS — F33.42 RECURRENT MAJOR DEPRESSION IN FULL REMISSION (HCC): Primary | ICD-10-CM

## 2024-05-21 DIAGNOSIS — Z00.00 MEDICARE ANNUAL WELLNESS VISIT, SUBSEQUENT: ICD-10-CM

## 2024-05-21 DIAGNOSIS — M85.89 OSTEOPENIA OF MULTIPLE SITES: Chronic | ICD-10-CM

## 2024-05-21 PROBLEM — Z17.1 CARCINOMA OF UPPER-INNER QUADRANT OF RIGHT BREAST IN FEMALE, ESTROGEN RECEPTOR NEGATIVE (HCC): Status: ACTIVE | Noted: 2024-05-21

## 2024-05-21 PROBLEM — C50.211 CARCINOMA OF UPPER-INNER QUADRANT OF RIGHT BREAST IN FEMALE, ESTROGEN RECEPTOR NEGATIVE (HCC): Status: RESOLVED | Noted: 2024-05-21 | Resolved: 2024-05-21

## 2024-05-21 PROBLEM — Z17.1 CARCINOMA OF UPPER-INNER QUADRANT OF RIGHT BREAST IN FEMALE, ESTROGEN RECEPTOR NEGATIVE (HCC): Status: RESOLVED | Noted: 2024-05-21 | Resolved: 2024-05-21

## 2024-05-21 PROBLEM — C50.211 CARCINOMA OF UPPER-INNER QUADRANT OF RIGHT BREAST IN FEMALE, ESTROGEN RECEPTOR NEGATIVE (HCC): Status: ACTIVE | Noted: 2024-05-21

## 2024-05-21 PROCEDURE — 99214 OFFICE O/P EST MOD 30 MIN: CPT | Performed by: INTERNAL MEDICINE

## 2024-05-21 PROCEDURE — 99213 OFFICE O/P EST LOW 20 MIN: CPT | Performed by: OBSTETRICS & GYNECOLOGY

## 2024-05-21 PROCEDURE — G0439 PPPS, SUBSEQ VISIT: HCPCS | Performed by: INTERNAL MEDICINE

## 2024-05-21 RX ORDER — ESTRADIOL 0.1 MG/G
CREAM VAGINAL
Qty: 42.5 G | Refills: 0 | Status: SHIPPED | OUTPATIENT
Start: 2024-05-23

## 2024-05-21 NOTE — PATIENT INSTRUCTIONS
Kegel Exercises for Women   AMBULATORY CARE:   Kegel exercises  help strengthen your pelvic muscles. Pelvic muscles hold your pelvic organs, such as your bladder and uterus, in place. Kegel exercises help prevent or control certain conditions, such as urine incontinence (leakage) or uterine prolapse.       Call your doctor or physical therapist if:   You cannot feel your muscles tighten or relax.    You continue to leak urine.    You have questions or concerns about your condition or care.    Use the correct muscles:  Pelvic muscles are the muscles you use to control urine flow. To target these muscles, stop and start the flow of urine several times. This will help you become familiar with how it feels to tighten and relax these muscles.  How to do Kegel exercises:   Get into a comfortable position.  You may lie down, stand up, or sit down to do these exercises. When you first try to do these exercises, it may be easier if you lie down.    Tighten or squeeze your pelvic muscles slowly.  It may feel like you are trying to hold back urine or gas. Hold this position for 3 seconds. Relax for 3 seconds. Repeat this cycle 10 times. Do not hold your breath when you do Kegel exercises. Keep your stomach, back, and leg muscles relaxed.    Do 10 sets of Kegel exercises, at least 3 times a day.  When you know how to do Kegel exercises, use different positions. This will help to strengthen your pelvic muscles as much as possible. You can do these exercises while you lie on the floor, watch TV, or while you stand. Tighten your pelvic muscles before you sneeze, cough, or lift to prevent urine leakage. You may notice improved bladder control within about 6 weeks.    Follow up with your doctor or physical therapist as directed:  Write down your questions so you remember to ask them during your visits.  © Copyright [employer] 2023 Information is for End User's use only and may not be sold, redistributed or otherwise used for  commercial purposes.  The above information is an  only. It is not intended as medical advice for individual conditions or treatments. Talk to your doctor, nurse or pharmacist before following any medical regimen to see if it is safe and effective for you.      Vaginal Estrogen Replacement Therapy    What is Estrogen vaginal cream?   Estrogen is a female hormone which controls many processes in the female   body. During menopause, estrogen production slows down and then stops.   Estrogen vaginal cream is supplied as a brand name of either Estrace or   Premarin    What is Estrogen cream used for?   Estrogen vaginal cream is used to decrease menopause symptoms such as   vaginal dryness, burning, and itching of the vaginal area. It also may reduce   symptoms of urinary urgency and irritation with urination.     How do I apply the Estrogen cream?   Estrogen cream comes in a tube with an applicator. The applicator is used to   insert the cream into your vagina. Your doctor will tell you how often to use the   cream. Generally you will use the cream daily for several weeks and then   decrease use to 3 times a week. It is best to use at bedtime so that there is less   leakage of the cream. The packaging will have specific instructions for applying   the cream and pictures to help you locate the vagina. General instructions are   as follows:   ? Wash your hands with warm water and soap.   ? Remove the cap from the tube and attach the end of the applicator onto   the tube.   ? Squeeze from the bottom of the tube to get the prescribed amount of   cream into the applicator. Use the measurement markings on the   applicator to make sure you have the correct dose.   ? Remove the applicator from the tube.   ? Locate the opening to your vagina.   ? Insert the applicator into the vagina (like using a tampon), and push on   the plunger of the applicator to insert the medication. Then slowly   withdraw the applicator.    ? Clean the applicator with warm water and soap. It is best to use liquid   soap as bar soap can leave a film on the applicator.   ? Wash hands with warm water and soap.     What are side effects of estrogen vaginal cream?   Side effects may include headache, breast pain, irregular vaginal bleeding or   spotting, stomach cramps or bloating, nausea and vomiting, or hair loss. Some   women may also notice vaginal burning, irritation, itching, or discharge. If you   experience any of these side effects, you should call your doctor.     Safety Concerns   If you have a history of breast cancer, please ask your oncologist before   starting estrogen therapy. Although topical application of estrogen has less   absorption than oral supplements, there may be a slight increased risk of blood   clots with this medication.       Dermatitis   AMBULATORY CARE:   Dermatitis  is skin inflammation. Dermatitis may be caused by allergens such as dust mites, pet dander, pollen, and certain foods. Dermatitis can also develop when something touches your skin and irritates it or causes an allergic reaction. Examples include soaps, chemicals, latex, and poison ivy.  Signs and symptoms of dermatitis  depend on the cause:  An itchy rash    Redness    Bumps or blisters that crust over or ooze clear fluid    Swelling    Call your local emergency number (911 in the [place]) or have someone call if:   You have symptoms of anaphylaxis, such as sudden trouble breathing, throat swelling, or feeling dizzy or lightheaded.      Seek care immediately if:   You develop a fever or have red streaks going up your arm or leg.    Your rash gets more swollen, red, or hot.    Call your doctor or dermatologist if:   Your skin blisters, oozes white or yellow pus, or has a foul-smelling discharge.    Your rash spreads or does not get better, even after treatment.    You have questions or concerns about your condition or care.    Treatment for dermatitis  depends on  the cause of your rash. You may need medicines to help decrease itching and inflammation or treat a bacterial infection. They may be given as a topical cream, shot, or a pill.  Manage dermatitis:   Apply a cool compress to your rash.  This will help soothe your skin.    Apply lotions or creams to the area.  These help keep your skin moist and decrease itching. Apply the lotion or cream right after a lukewarm bath or shower when your skin is still damp. Use products that do not contain dye or a scent.    Avoid skin irritants.  Examples include makeup, hair products, soaps, and cleansers. Use products that do not contain a scent or dye.    Follow up with your doctor or dermatologist as directed:  Write down your questions so you remember to ask them during your visits.  © Copyright [employer] 2023 Information is for End User's use only and may not be sold, redistributed or otherwise used for commercial purposes.  The above information is an  only. It is not intended as medical advice for individual conditions or treatments. Talk to your doctor, nurse or pharmacist before following any medical regimen to see if it is safe and effective for you.  Urinary Incontinence   WHAT YOU NEED TO KNOW:   What is urinary incontinence (UI)?  UI is loss of bladder control. UI develops because your bladder cannot store or empty urine properly. The 3 most common types of UI are stress incontinence, urge incontinence, or both.       What are the signs and symptoms of UI?   You feel like your bladder does not empty completely when you urinate.    You urinate often and need to urinate immediately.    You leak urine when you sleep, or you wake up with the urge to urinate.    You leak urine when you cough, sneeze, exercise, or laugh.    How is UI treated?   Medicines  can help strengthen your bladder control.    Electrical stimulation  is used to send a small amount of electrical energy to your pelvic floor muscles. This helps  control your bladder function. Electrodes may be placed outside your body or in your rectum. For women, the electrodes may be placed in the vagina.    A bulking agent  may be injected into the wall of your urethra to make it thicker. This helps keep your urethra closed and decreases urine leakage.         Devices  such as a clamp, pessary, or tampon may help stop urine leaks. Ask your healthcare provider for more information about these and other devices.    Surgery  may be needed if other treatments do not work. Several types of surgery can help improve your bladder control. Ask your provider for more information about the surgery you may need.    How can I manage my symptoms?   Do pelvic muscle exercises often.  Your pelvic muscles help you stop urinating. Squeeze these muscles tight for 5 seconds, then relax for 5 seconds. Gradually work up to squeezing for 10 seconds. Do 3 sets of 15 repetitions a day, or as directed. This will help strengthen your pelvic muscles and improve bladder control.    Keep a UI record.  Write down how often you leak urine and how much you leak. Make a note of what you were doing when you leaked urine.    Train your bladder.  Go to the bathroom at set times, such as every 2 hours, even if you do not feel the urge to go. You can also try to hold your urine when you feel the urge to go. For example, hold your urine for 5 minutes when you feel the urge to go. As that becomes easier, hold your urine for 10 minutes.    Drink liquids as directed.  Ask your healthcare provider how much liquid to drink each day and which liquids are best for you. You may need to limit the amount of liquid you drink to help control your urine leakage. Do not drink any liquid right before you go to bed. Limit or do not have drinks that contain caffeine or alcohol.    Prevent constipation.  Eat a variety of high-fiber foods. Good examples are high-fiber cereals, beans, vegetables, and whole-grain breads. Prune  juice may help make your bowel movement softer. Walking is the best way to trigger your intestines to have a bowel movement.         Exercise regularly and maintain a healthy weight.  Ask your provider how much you should weigh and about the best exercise plan for you. Weight loss and exercise will decrease pressure on your bladder and help you control your leakage. Ask your provider to help you create a weight loss plan, if needed.         Use a catheter as directed  to help empty your bladder. A catheter is a tiny, plastic tube that is put into your bladder to drain your urine. Your provider may tell you to use a catheter to prevent your bladder from getting too full and leaking urine.    Go to behavior therapy as directed.  Behavior therapy may be used to help you learn to control your urge to urinate.    When should I seek immediate care?   You have severe pain.    You are confused or cannot think clearly.    You see blood in your urine.    You have pain when you urinate.    You have new or worse pain, even after treatment.    When should I call my doctor?   You have a fever.    Your mouth feels dry or you have vision changes.    Your urine is cloudy or smells bad.    You have questions or concerns about your condition or care.    CARE AGREEMENT:   You have the right to help plan your care. Learn about your health condition and how it may be treated. Discuss treatment options with your healthcare providers to decide what care you want to receive. You always have the right to refuse treatment. The above information is an  only. It is not intended as medical advice for individual conditions or treatments. Talk to your doctor, nurse or pharmacist before following any medical regimen to see if it is safe and effective for you.  © Copyright [employer] 2023 Information is for End User's use only and may not be sold, redistributed or otherwise used for commercial purposes.

## 2024-05-21 NOTE — ASSESSMENT & PLAN NOTE
Weight loss is NOT easy for anyone.  Achieving weight loss goals have been a challenge for many people and a quick fad diet will NOT work the way the product or commercials promises us it will work.  Life changes are long lasting.      Weight loss comes in time and with changes that are personalized for you, NOT the same for everyone.       7% weight loss of your body weight will MAKE a HUGE difference in your health status including improvement to Diabetes and high blood pressure management.       Here are some quick calculations for 7% weight loss:      If you weigh 180 pounds then GOAL 7% weight loss would be 12.5 pounds.  If you weigh 190 pounds then GOAL 7% weight loss would be 13.0 pounds.  If you weigh 200 pounds then GOAL 7% weight loss would be 14.0 pounds.  If you weigh 210 pounds then GOAL 7% weight loss would be 14.7 pounds.  If you weigh 220 pounds then GOAL 7% weight loss would be 15.5 pounds.  If you weigh 230 pounds then GOAL 7% weight loss would be 16.0 pounds.  If you weigh 240 pounds then GOAL 7% weight loss would be 17.0 pounds.     If you weight over 250 pounds then a weight loss of >17 pounds is recommended.       Quick tips:  1. Track food intake with an sheri or tracker like www.[facility].com, sparkpeople.com, loseit.com, calorieking.com.   Reduce calorie intake by 500 a day to help lose weight but do NOT eat less than 1400 calories a day as this will turn off your metabolism and result in no weight loss at all.       2. Higher protein and lower carbohydrate intake works well for many but do NOT eliminate ONE food group completely as this is difficult to maintain in the long-term and will result in gaining back the weight quickly.       3. Track your steps with sheri on phone, Heart sheri on iphone, or a pedometer such as fitbit or paOndebone Up 24 to help you reach 10,000 steps daily.       4.  Many patients do well with a carbohydrate level daily that is less than 200 g of carbs.  In addition  the protein level should be over 75 g of protein in the full day.

## 2024-05-21 NOTE — ASSESSMENT & PLAN NOTE
Due for DXA scan. New order was placed today.    Orders:    DXA bone density spine hip and pelvis; Future

## 2024-05-21 NOTE — PROGRESS NOTES
Diagnoses and all orders for this visit:    Frequent UTI  -     estradiol (ESTRACE) 0.1 mg/g vaginal cream; And apply a pea sized amount to external vaginal opening and urethra area two to three times weekly Do not start before May 23, 2024.    Vulvar dermatitis        See after visit summary for further information and recommendations to the above mentioned subjects which we may or may not have covered in detail during your visit     Subjective    CC: Problem visit     Yi Foy is a 79 y.o. female  presents for follow up from 2024, she was treated for yeast and vulvar derm after taking antibiotics.  She also has frequent UTI's , she follows with urology   She reports today that she is doing much better her symptoms have mostly resolved, she no longer is having any itching  She does still have some buring of the skin as she leaks urine, is using Vasaline with decent relief, advised to apply more often throughout the day  Was considering Botox injections to the bladder to help with leakage but was hesitant d/t frequent UTI's , we discussed trailing topical estrogen to the urethra 2-3 x weekly to see if that helps reduce frequency of UTI;s , advised to follow up with urology sooner if needed   No LMP recorded. Patient is postmenopausal.    No LMP recorded. Patient is postmenopausal.    Past Medical History:   Diagnosis Date    Anxiety     Arthritis     Benign neoplasm of skin     Breast cancer (HCC)     Bursitis     Right hip    Cancer (HCC)     Depression     Diverticulitis     Frequency of urination     HL (hearing loss)     Hypercholesterolemia     IBS (irritable bowel syndrome)     Lyme disease     Morbid obesity (HCC)     Partial small bowel obstruction (HCC)     Prediabetes     Sleep apnea 2018    Small bowel obstruction (HCC) 2021    Urinary tract infection 2022    Vitamin D deficiency      Past Surgical History:   Procedure Laterality Date    APPENDECTOMY  1950     BREAST LUMPECTOMY Right     In spring of 2020 as per pt      SECTION      COLECTOMY      COLONOSCOPY      COMPLETE. ,     HERNIA REPAIR      SMALL INTESTINE SURGERY         Immunization History   Administered Date(s) Administered    INFLUENZA 09/15/2009, 2012, 2013, 10/18/2014, 10/17/2015, 2018, 2019    Influenza Split High Dose Preservative Free IM 10/13/2017, 2018, 11/10/2019    Influenza, high dose seasonal 0.7 mL 10/18/2020, 10/14/2022, 2023    Influenza, seasonal, injectable 2014, 2016    Pneumococcal Conjugate 13-Valent 2016    Pneumococcal Polysaccharide PPV23 2013    Tdap 1945, 2017    Zoster 2013       Family History   Problem Relation Age of Onset    No Known Problems Mother     Cancer Father         Gallbladder    Hypertension Father     Hypertension Son     Hypotension Son     Breast cancer Neg Hx     Colon cancer Neg Hx     Ovarian cancer Neg Hx     Uterine cancer Neg Hx     Cervical cancer Neg Hx      Social History     Tobacco Use    Smoking status: Never     Passive exposure: Never    Smokeless tobacco: Never   Vaping Use    Vaping status: Never Used   Substance Use Topics    Alcohol use: No    Drug use: No       Current Outpatient Medications:     ascorbic acid (VITAMIN C) 500 mg tablet, Take 1 tablet by mouth every morning, Disp: , Rfl:     aspirin 81 MG tablet, Take 81 mg by mouth daily, Disp: , Rfl:     cholecalciferol (VITAMIN D3) 1,000 units tablet, Take 1 tablet by mouth 2 (two) times a day, Disp: , Rfl:     Docusate Sodium 100 MG capsule, Take 100 mg by mouth 2 (two) times a day 1 time per day, Disp: , Rfl:     escitalopram (LEXAPRO) 20 mg tablet, Take 1 tablet (20 mg total) by mouth daily, Disp: 90 tablet, Rfl: 3    [START ON 2024] estradiol (ESTRACE) 0.1 mg/g vaginal cream, And apply a pea sized amount to external vaginal opening and urethra area two to three times weekly Do not start  "before May 23, 2024., Disp: 42.5 g, Rfl: 0    magnesium 30 MG tablet, Take 30 mg by mouth 2 (two) times a day, Disp: , Rfl:     Misc Natural Products (FIBER 7) POWD, Take by mouth daily , Disp: , Rfl:     nystatin (MYCOSTATIN) powder, Apply 1 Application topically 2 (two) times a day as needed (rash) to affected area, Disp: 30 g, Rfl: 3    nystatin-triamcinolone (MYCOLOG-II) ointment, Apply topically 2 (two) times a day 2 times daily or as needed for itching, Disp: 30 g, Rfl: 0    Probiotic Product (PROBIOTIC PO), Take by mouth, Disp: , Rfl:     VITAMIN B COMPLEX-C PO, Take by mouth daily , Disp: , Rfl:     zinc gluconate 50 mg tablet, Take 50 mg by mouth in the morning. As needed., Disp: , Rfl:   Patient Active Problem List    Diagnosis Date Noted    Recurrent major depression in full remission ([facility]) 2023    History of breast cancer 2022    Thrombocytopenia ([facility]) 2021    Other irritable bowel syndrome 2020    CASEY (obstructive sleep apnea)     Vitamin D deficiency 07/10/2018    Borderline hyperlipidemia 05/10/2017    Osteopenia of multiple sites 2016    Morbid obesity with BMI of 45.0-49.9, adult ([facility]) 2016    Prediabetes 2014       No Known Allergies    OB History    Para Term  AB Living   2 2 2     2   SAB IAB Ectopic Multiple Live Births           2      # Outcome Date GA Lbr Jaziel/2nd Weight Sex Type Anes PTL Lv   2 Term            1 Term                Vitals:    24 0940   BP: 140/76   BP Location: Left arm   Patient Position: Sitting   Cuff Size: Large   Weight: 108 kg (238 lb)   Height: 4' 10\" (1.473 m)     Body mass index is 49.74 kg/m².    Review of Systems     Constitutional: Negative for chills, fatigue, fever, headaches, visual disturbances, and unexpected weight change.   Respiratory: Negative for cough, & shortness of breath.  Cardiovascular: Negative for chest pain. .    Gastrointestinal: Negative for Abd pain, nausea & vomiting, constipation " and diarrhea.   Genitourinary: Negative for difficulty urinating, dysuria, hematuria, dyspareunia, unusual vaginal bleeding or discharge  Skin: Negative skin changes    Physical Exam     Constitutional: Alert & Oriented x3, well-developed and well-nourished. No distress.   HENT: Atraumatic, Normocephalic,   Neck: Normal range of motion.   Pulmonary: Effort normal.   Abdominal: Soft. No tenderness or masses  Musculoskeletal: Normal ROM  Skin: Warm & Dry  Psychological: Normal mood, thought content, behavior & judgement       Pelvic exam was performed with patient supine, lithotomy position.   Exam consistent with post menopausal atrophy    Labia: Negative rash, tenderness, lesion or injury on the right labia.              Negative rash, tenderness, lesion or injury on the left labia.   Urethral meatus:  Negative for  tenderness, inflammation. Visible urinary leakage   Uterus: not deviated, enlarged, fixed or tender.   Cervix: No CMT, no discharge or friability.   Right adnexa: no mass, no tenderness and no fullness.  Left adnexa: no mass, no tenderness and no fullness.   Vagina: No erythema, tenderness, masses, or foreign body in the vagina. No signs of injury around the vagina. No unusual vaginal discharge   Perineum without lesions, signs of injury, erythema or swelling.  Inguinal Canal:        Right: No inguinal adenopathy or hernia present.        Left: No inguinal adenopathy or hernia present.

## 2024-05-21 NOTE — PATIENT INSTRUCTIONS
Type 2 Diabetes Management for Adults   WHAT YOU NEED TO KNOW:   What do I need to know about type 2 diabetes management?  Type 2 diabetes is a disease that affects how your body uses glucose (sugar). Either your body cannot make enough insulin, or it cannot use the insulin correctly. It is important to keep diabetes controlled to prevent damage to your heart, blood vessels, and other organs. Management will help you feel well and enjoy your daily activities. Your diabetes care team providers can help you make a plan to fit diabetes care into your schedule. Your plan can change over time to fit your needs and your family's needs.       What do I need to know about high blood sugar levels?  High blood sugar levels may not cause any symptoms. You may feel more thirsty or urinate more often than usual. Over time, high blood sugar levels can damage your nerves, blood vessels, tissues, and organs. The following can increase your blood sugar levels:  Large meals or large amounts of carbohydrates at one time    Less physical activity    Stress    Illness    A lower dose of diabetes medicine or insulin, or a late dose    What do I need to know about low blood sugar levels?  Symptoms include feeling shaky, dizzy, irritable, or confused. You can prevent symptoms by keeping your blood sugar levels from going too low.  Treat a low blood sugar level right away:      Drink 4 ounces of juice or have 1 tube of glucose gel.    Check your blood sugar level again 10 to 15 minutes later.    When the level goes back to normal, eat a meal or snack to prevent another decrease.       Keep glucose gel, raisins, or hard candy with you at all times to treat a low blood sugar level.     Your blood sugar level can get too low if you take diabetes medicine or insulin and do not eat enough food.     If you use insulin, check your blood sugar level before you exercise.      If your blood sugar level is below 100 mg/dL, eat 4 crackers or 2 ounces  of raisins, or drink 4 ounces of juice.    Check your level every 30 minutes if you exercise longer than 1 hour.    You may need a snack during or after exercise.    What can I do to manage my blood sugar levels?   Check your blood sugar levels as directed and as needed.  Several items are available to use to check your levels. You may need to check by testing a drop of blood in a glucose monitor. You may instead be given a continuous glucose monitoring (CGM) device. The device is worn at all times. The CGM checks your blood sugar level every 5 minutes. It sends results to an electronic device such as a smart phone. A CGM can be used with or without an insulin pump. You and your diabetes care team providers will decide on the best method for you. The goal for blood sugar levels before meals  is between 80 and 130 mg/dL and 2 hours after eating  is lower than 180 mg/dL.            Make healthy food choices.  Work with a dietitian to create a meal plan that works for you and your schedule. A dietitian can help you learn how to eat the right amount of carbohydrates (sugar and starchy foods) during your meals and snacks. Examples of carbohydrates are breads, cereals, rice, pasta, fruit, low-fat dairy, and sweets. Carbohydrates can raise your blood sugar level if you eat too many at one time.         Eat high-fiber foods as directed.  Fiber helps improve blood sugar levels. Fiber also lowers your risk for heart disease and other problems diabetes can cause. Examples of high-fiber foods include vegetables, whole-grain bread, and beans such as fields beans. Your dietitian can tell you how much fiber to have each day.         Get regular physical activity.  Physical activity can help you get to your target blood sugar level goal and manage your weight. Get at least 150 minutes of moderate to vigorous aerobic physical activity each week. Resistance training, such as lifting weights, should be done 3 times each week. Do not  miss more than 2 days of physical activity in a row. Do not sit longer than 30 minutes at a time. Your healthcare provider can help you create an activity plan. The plan can include the best activities for you and can help you build your strength and endurance.            Maintain a healthy weight.  Ask your team what a healthy weight is for you. A healthy weight can help you control diabetes and prevent heart disease. Ask your team to help you create a weight-loss plan, if needed. Even a loss of 3% to 7% of your excess body weight can help make a difference in managing diabetes. Your team will help you set a weight-loss goal, such as 10 to 15 pounds, or 5% of your extra weight. Together you and your team can set manageable weight-loss goals.    Take your diabetes medicine or insulin as directed.  You may need diabetes medicine, insulin, or both to help control your blood sugar levels. Your provider will teach you how and when to take your diabetes medicine or insulin. You will also be taught about side effects oral diabetes medicine can cause. Insulin may be injected or given through a pump or pen. You and your providers will decide on the best method for you:    An insulin pump  is an implanted device that gives your insulin 24 hours a day. An insulin pump prevents the need for multiple insulin injections in a day.         An insulin pen  is a device prefilled with the right amount of insulin.         You and your family members will be taught how to draw up and give insulin  if this is the best method for you. Your providers will also teach you how to dispose of needles and syringes.    You will learn how much insulin you need  and when to give it. You will be taught when not to give insulin. You will also be taught what to do if your blood sugar level drops too low. This may happen if you take insulin and do not eat the right amount of carbohydrates.    What else can I do to manage type 2 diabetes?   Wear  medical alert identification.  Wear medical alert jewelry or carry a card that says you have diabetes. Ask your care team provider where to get these items.         Do not smoke.  Nicotine and other chemicals in cigarettes and cigars can cause lung and blood vessel damage. It also makes it more difficult to manage your diabetes. Ask your provider for information if you currently smoke and need help to quit. Do not use e-cigarettes or smokeless tobacco in place of cigarettes or to help you quit. They still contain nicotine.    Check your feet each day for cuts, scratches, calluses, or other wounds.  Look for redness and swelling, and feel for warmth. Wear shoes that fit well. Check your shoes for rocks or other objects that can hurt your feet. Do not walk barefoot or wear shoes without socks. Wear cotton socks to help keep your feet dry.         Ask about vaccines you may need.  You have a higher risk for serious illness if you get the flu, pneumonia, COVID-19, or hepatitis. Ask your provider if you should get vaccines to prevent these or other diseases, and when to get the vaccines.    Talk to your provider if you become stressed about diabetes care.  Sometimes being able to fit diabetes care into your life can cause increased stress. The stress can cause you not to take care of yourself properly. Your provider can help by offering tips about self-care. A mental health provider can listen and offer help with self-care issues. Other types of counseling can help you make nutrition or physical activity changes.    Have your A1c checked as directed.  Your provider may check your A1c every 3 months, or 2 times each year if your diabetes is controlled. An A1c test shows the average amount of sugar in your blood over the past 2 to 3 months. Your provider will tell you what your A1c level should be.    Have screening tests as directed.  Your provider may recommend screening for complications of diabetes and other conditions  that may develop. Some screenings may begin right away and some may happen within the first 5 years of diagnosis:    Examples of diabetes complications  include kidney problems, high cholesterol, high blood pressure, blood vessel problems, eye problems, and sleep apnea.    You may be screened for a low vitamin B level  if you take oral diabetes medicine for a long time.    You may be screened for polycystic ovarian syndrome (PCOS)  if you are of childbearing age.    Have someone call your local emergency number (911 in the [place]) if:   You cannot be woken.    You have signs of diabetic ketoacidosis:     confusion, fatigue    vomiting    rapid heartbeat    fruity smelling breath    extreme thirst    dry mouth and skin    You have any of the following signs of a heart attack:      Squeezing, pressure, or pain in your chest    You may  also have any of the following:     Discomfort or pain in your back, neck, jaw, stomach, or arm    Shortness of breath    Nausea or vomiting    Lightheadedness or a sudden cold sweat    You have any of the following signs of a stroke:      Numbness or drooping on one side of your face     Weakness in an arm or leg    Confusion or difficulty speaking    Dizziness, a severe headache, or vision loss    When should I call my doctor or diabetes care team provider?   You have a sore or wound that will not heal.    You have a change in the amount you urinate.    Your blood sugar levels are higher than your target goals.    You often have lower blood sugar levels than your target goals.    Your skin is red, dry, warm, or swollen.    You have trouble coping with diabetes, or you feel anxious or depressed.    You have trouble following any part of your care plan, such as your meal plan.    You have questions or concerns about your condition or care.    CARE AGREEMENT:   You have the right to help plan your care. Learn about your health condition and how it may be treated. Discuss treatment options  with your healthcare providers to decide what care you want to receive. You always have the right to refuse treatment. The above information is an  only. It is not intended as medical advice for individual conditions or treatments. Talk to your doctor, nurse or pharmacist before following any medical regimen to see if it is safe and effective for you.  © Copyright [employer] 2023 Information is for End User's use only and may not be sold, redistributed or otherwise used for commercial purposes.

## 2024-05-21 NOTE — PROGRESS NOTES
Ambulatory Visit  Name: Yi Foy      : 1945      MRN: [id number]  Encounter Provider: Garry Gama DO  Encounter Date: 2024   Encounter department: [facility] [place]    Assessment & Plan  Recurrent major depression in full remission (HCC)    Stable and will continue current dose of escitalopram.  Thrombocytopenia (HCC)    Stable and no bleeding episodes. Will monitor.    Orders:    CBC; Future    Morbid obesity with BMI of 45.0-49.9, adult (HCC)    Weight loss is NOT easy for anyone.  Achieving weight loss goals have been a challenge for many people and a quick fad diet will NOT work the way the product or commercials promises us it will work.  Life changes are long lasting.      Weight loss comes in time and with changes that are personalized for you, NOT the same for everyone.       7% weight loss of your body weight will MAKE a HUGE difference in your health status including improvement to Diabetes and high blood pressure management.       Here are some quick calculations for 7% weight loss:      If you weigh 180 pounds then GOAL 7% weight loss would be 12.5 pounds.  If you weigh 190 pounds then GOAL 7% weight loss would be 13.0 pounds.  If you weigh 200 pounds then GOAL 7% weight loss would be 14.0 pounds.  If you weigh 210 pounds then GOAL 7% weight loss would be 14.7 pounds.  If you weigh 220 pounds then GOAL 7% weight loss would be 15.5 pounds.  If you weigh 230 pounds then GOAL 7% weight loss would be 16.0 pounds.  If you weigh 240 pounds then GOAL 7% weight loss would be 17.0 pounds.     If you weight over 250 pounds then a weight loss of >17 pounds is recommended.       Quick tips:  1. Track food intake with an sheri or tracker like www.twenty5media.com, sparkpeople.com, loseit.com, calorieking.com.   Reduce calorie intake by 500 a day to help lose weight but do NOT eat less than 1400 calories a day as this will turn off your metabolism and result in no weight  loss at all.       2. Higher protein and lower carbohydrate intake works well for many but do NOT eliminate ONE food group completely as this is difficult to maintain in the long-term and will result in gaining back the weight quickly.       3. Track your steps with sheri on phone, Heart sheri on iphone, or a pedometer such as fitbit or jawbone Up 24 to help you reach 10,000 steps daily.       4.  Many patients do well with a carbohydrate level daily that is less than 200 g of carbs.  In addition the protein level should be over 75 g of protein in the full day.     Prediabetes    Watch carb/sugar intake. Weight loss encouraged.    Orders:    Hemoglobin A1C; Future    Borderline hyperlipidemia    Cholesterol stable and will continue heart healthy diet.    Orders:    Basic metabolic panel; Future    Lipid Panel with Direct LDL reflex; Future    Osteopenia of multiple sites    Due for DXA scan. New order was placed today.    Orders:    DXA bone density spine hip and pelvis; Future    Medicare annual wellness visit, subsequent        Depression Screening and Follow-up Plan: Patient was screened for depression during today's encounter. They screened negative with a PHQ-9 score of 0.      Preventive health issues were discussed with patient, and age appropriate screening tests were ordered as noted in patient's After Visit Summary. Personalized health advice and appropriate referrals for health education or preventive services given if needed, as noted in patient's After Visit Summary.    History of Present Illness     Yi presents for follow-up and medicare wellness visit. No big changes with her health. Continues to struggle with arthritis in her knees and has had steroid injections to help aid with pain relief. 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  Patient Care Team:  Garry Gama DO as PCP - General (Internal Medicine)    Review of Systems   Constitutional:  Negative for activity change, appetite change and  fatigue.   Respiratory:  Negative for apnea, cough, chest tightness, shortness of breath and wheezing.    Cardiovascular:  Negative for chest pain, palpitations and leg swelling.   Gastrointestinal:  Negative for abdominal distention, abdominal pain, blood in stool, constipation, diarrhea, nausea and vomiting.   Musculoskeletal:  Negative for arthralgias, back pain, gait problem, joint swelling and myalgias.   Skin:  Negative for rash and wound.   Neurological:  Negative for dizziness, weakness, light-headedness, numbness and headaches.   Psychiatric/Behavioral:  Negative for behavioral problems, confusion, hallucinations, sleep disturbance and suicidal ideas. The patient is not nervous/anxious.      Medical History Reviewed by provider this encounter:  Tobacco  Allergies  Meds  Problems  Med Hx  Surg Hx  Fam Hx       Annual Wellness Visit Questionnaire   Yi is here for her Subsequent Wellness visit. Last Medicare Wellness visit information reviewed, patient interviewed and updates made to the record today.      Health Risk Assessment:   Patient rates overall health as good. Patient feels that their physical health rating is same. Patient is satisfied with their life. Eyesight was rated as same. Hearing was rated as slightly worse. Patient feels that their emotional and mental health rating is same. Patients states they are never, rarely angry. Patient states they are sometimes unusually tired/fatigued. Pain experienced in the last 7 days has been none. Patient states that she has experienced no weight loss or gain in last 6 months.     Depression Screening:   PHQ-9 Score: 0      Fall Risk Screening:   In the past year, patient has experienced: no history of falling in past year      Urinary Incontinence Screening:   Patient has not leaked urine accidently in the last six months.     Home Safety:  Patient does not have trouble with stairs inside or outside of their home. Patient has working smoke alarms  and has working carbon monoxide detector. Home safety hazards include: none.     Nutrition:   Current diet is Regular.     Medications:   Patient is currently taking over-the-counter supplements. OTC medications include: see medication list. Patient is able to manage medications.     Activities of Daily Living (ADLs)/Instrumental Activities of Daily Living (IADLs):   Walk and transfer into and out of bed and chair?: Yes  Dress and groom yourself?: Yes    Bathe or shower yourself?: Yes    Feed yourself? Yes  Do your laundry/housekeeping?: Yes  Manage your money, pay your bills and track your expenses?: Yes  Make your own meals?: Yes    Do your own shopping?: Yes    Durable Medical Equipment Suppliers  none    Previous Hospitalizations:   Any hospitalizations or ED visits within the last 12 months?: No      Advance Care Planning:   Living will: Yes    Durable POA for healthcare: Yes    Advanced directive: Yes    Five wishes given: No      Cognitive Screening:   Provider or family/friend/caregiver concerned regarding cognition?: No    PREVENTIVE SCREENINGS      Cardiovascular Screening:    General: Screening Not Indicated and History Lipid Disorder      Diabetes Screening:     General: Screening Current      Colorectal Cancer Screening:     General: Screening Not Indicated      Breast Cancer Screening:     General: History Breast Cancer      Cervical Cancer Screening:    General: Screening Not Indicated      Osteoporosis Screening:    General: Screening Current      Abdominal Aortic Aneurysm (AAA) Screening:        General: Screening Not Indicated      Lung Cancer Screening:     General: Screening Not Indicated      Hepatitis C Screening:    General: Screening Current    Screening, Brief Intervention, and Referral to Treatment (SBIRT)    Screening  Typical number of drinks in a day: 0  Typical number of drinks in a week: 0  Interpretation: Low risk drinking behavior.    AUDIT-C Screenin) How often did you have a  "drink containing alcohol in the past year? never  2) How many drinks did you have on a typical day when you were drinking in the past year? 0  3) How often did you have 6 or more drinks on one occasion in the past year? never    AUDIT-C Score: 0  Interpretation: Score 0-2 (female): Negative screen for alcohol misuse    Single Item Drug Screening:  How often have you used an illegal drug (including marijuana) or a prescription medication for non-medical reasons in the past year? never    Single Item Drug Screen Score: 0  Interpretation: Negative screen for possible drug use disorder    Brief Intervention  Alcohol & drug use screenings were reviewed. No concerns regarding substance use disorder identified.     Other Counseling Topics:   Car/seat belt/driving safety, skin self-exam, sunscreen and regular weightbearing exercise.     Social Determinants of Health     Financial Resource Strain: Low Risk  (5/4/2023)    Overall Financial Resource Strain (CARDIA)     Difficulty of Paying Living Expenses: Not hard at all   Food Insecurity: No Food Insecurity (5/21/2024)    Hunger Vital Sign     Worried About Running Out of Food in the Last Year: Never true     Ran Out of Food in the Last Year: Never true   Transportation Needs: No Transportation Needs (5/21/2024)    PRAPARE - Transportation     Lack of Transportation (Medical): No     Lack of Transportation (Non-Medical): No   Housing Stability: Low Risk  (5/21/2024)    Housing Stability Vital Sign     Unable to Pay for Housing in the Last Year: No     Number of Times Moved in the Last Year: 0     Homeless in the Last Year: No   Utilities: Not At Risk (5/21/2024)    [facility] Utilities     Threatened with loss of utilities: No     Objective     /78   Pulse 95   Temp (!) 96.8 °F (36 °C) (Tympanic)   Ht 4' 10.25\" (1.48 m)   Wt 108 kg (238 lb 12.8 oz)   SpO2 95%   BMI 49.48 kg/m²     Physical Exam  Constitutional:       General: She is not in acute distress.     " Appearance: She is obese. She is not ill-appearing.   Cardiovascular:      Rate and Rhythm: Normal rate and regular rhythm.      Heart sounds: No murmur heard.  Pulmonary:      Effort: Pulmonary effort is normal. No respiratory distress.      Breath sounds: No wheezing.   Abdominal:      General: Bowel sounds are normal. There is no distension.      Tenderness: There is no abdominal tenderness.   Musculoskeletal:      Right lower leg: No edema.      Left lower leg: No edema.   Neurological:      Mental Status: She is alert.       Garry [facility], DO

## 2024-05-21 NOTE — PROGRESS NOTES
There are no diagnoses linked to this encounter.    See after visit summary for further information and recommendations to the above mentioned subjects which we may or may not have covered in detail during your visit     Subjective    CC: Problem visit     Yi Foy is a 79 y.o. female  presents for follow up from 2024, she was treated for yeast and vulvar derm after taking antibiotics.  She reports today that she is doing much better her symptoms have mostly resolved, she no longer is having any itching  She does still have some buring of the skin as she leaks urine, is using Vasaline with decent relief   No LMP recorded. Patient is postmenopausal.    Past Medical History:   Diagnosis Date    Anxiety     Arthritis     Benign neoplasm of skin     Breast cancer (HCC)     Bursitis     Right hip    Cancer (HCC)     Depression     Diverticulitis     Frequency of urination     HL (hearing loss)     Hypercholesterolemia     IBS (irritable bowel syndrome)     Lyme disease     Morbid obesity (HCC)     Partial small bowel obstruction (HCC)     Prediabetes     Sleep apnea 2018    Small bowel obstruction (HCC) 2021    Urinary tract infection 2022    Vitamin D deficiency      Past Surgical History:   Procedure Laterality Date    APPENDECTOMY  1950    BREAST LUMPECTOMY Right     In spring of 2020 as per pt      SECTION      COLECTOMY      COLONOSCOPY      COMPLETE. ,     HERNIA REPAIR      SMALL INTESTINE SURGERY         Immunization History   Administered Date(s) Administered    INFLUENZA 09/15/2009, 2012, 2013, 10/18/2014, 10/17/2015, 2018, 2019    Influenza Split High Dose Preservative Free IM 10/13/2017, 2018, 11/10/2019    Influenza, high dose seasonal 0.7 mL 10/18/2020, 10/14/2022, 2023    Influenza, seasonal, injectable 2014, 2016    Pneumococcal Conjugate 13-Valent 2016    Pneumococcal Polysaccharide  PPV23 05/21/2013    Tdap 1945, 06/28/2017    Zoster 05/21/2013       Family History   Problem Relation Age of Onset    No Known Problems Mother     Cancer Father         Gallbladder    Hypertension Father     Hypertension Son     Hypotension Son     Breast cancer Neg Hx     Colon cancer Neg Hx     Ovarian cancer Neg Hx     Uterine cancer Neg Hx     Cervical cancer Neg Hx      Social History     Tobacco Use    Smoking status: Never     Passive exposure: Never    Smokeless tobacco: Never   Vaping Use    Vaping status: Never Used   Substance Use Topics    Alcohol use: No    Drug use: No       Current Outpatient Medications:     ascorbic acid (VITAMIN C) 500 mg tablet, Take 1 tablet by mouth every morning, Disp: , Rfl:     aspirin 81 MG tablet, Take 81 mg by mouth daily, Disp: , Rfl:     cholecalciferol (VITAMIN D3) 1,000 units tablet, Take 1 tablet by mouth 2 (two) times a day, Disp: , Rfl:     Docusate Sodium 100 MG capsule, Take 100 mg by mouth 2 (two) times a day 1 time per day, Disp: , Rfl:     escitalopram (LEXAPRO) 20 mg tablet, Take 1 tablet (20 mg total) by mouth daily, Disp: 90 tablet, Rfl: 3    magnesium 30 MG tablet, Take 30 mg by mouth 2 (two) times a day, Disp: , Rfl:     Misc Natural Products (FIBER 7) POWD, Take by mouth daily , Disp: , Rfl:     Multiple Vitamin (STRESS/BIOTIN PO), Take by mouth, Disp: , Rfl:     nystatin (MYCOSTATIN) powder, Apply 1 Application topically 2 (two) times a day as needed (rash) to affected area, Disp: 30 g, Rfl: 3    nystatin-triamcinolone (MYCOLOG-II) ointment, Apply topically 2 (two) times a day 2 times daily or as needed for itching, Disp: 30 g, Rfl: 0    Probiotic Product (PROBIOTIC PO), Take by mouth, Disp: , Rfl:     VITAMIN B COMPLEX-C PO, Take by mouth daily , Disp: , Rfl:     zinc gluconate 50 mg tablet, Take 50 mg by mouth in the morning. As needed., Disp: , Rfl:   Patient Active Problem List    Diagnosis Date Noted    Recurrent major depression in full  remission ([facility]) 2023    History of breast cancer 2022    Thrombocytopenia ([facility]) 2021    Other irritable bowel syndrome 2020    CASEY (obstructive sleep apnea)     Vitamin D deficiency 07/10/2018    Borderline hyperlipidemia 05/10/2017    Osteopenia of multiple sites 2016    Morbid obesity with BMI of 45.0-49.9, adult ([facility]) 2016    Prediabetes 2014       No Known Allergies    OB History    Para Term  AB Living   2 2 2     2   SAB IAB Ectopic Multiple Live Births           2      # Outcome Date GA Lbr Jaziel/2nd Weight Sex Type Anes PTL Lv   2 Term            1 Term                There were no vitals filed for this visit.  There is no height or weight on file to calculate BMI.    Review of Systems     Constitutional: Negative for chills, fatigue, fever, headaches, visual disturbances, and unexpected weight change.   Respiratory: Negative for cough, & shortness of breath.  Cardiovascular: Negative for chest pain. .    Gastrointestinal: Negative for Abd pain, nausea & vomiting, constipation and diarrhea.   Genitourinary: Negative for difficulty urinating, dysuria, hematuria, dyspareunia, unusual vaginal bleeding or discharge  Skin: Negative skin changes    Physical Exam     Constitutional: Alert & Oriented x3, well-developed and well-nourished. No distress.   HENT: Atraumatic, Normocephalic,   Neck: Normal range of motion.   Pulmonary: Effort normal.   Abdominal: Soft. No tenderness or masses  Musculoskeletal: Normal ROM  Skin: Warm & Dry  Psychological: Normal mood, thought content, behavior & judgement     Pelvic exam was performed with patient supine, lithotomy position.      Labia: Negative rash, tenderness, lesion or injury on the right labia.              Negative rash, tenderness, lesion or injury on the left labia.   Urethral meatus:  Negative for  tenderness, inflammation or discharge.   Uterus: not deviated, enlarged, fixed or tender.   Cervix: No CMT, no  discharge or friability.   Right adnexa: no mass, no tenderness and no fullness.  Left adnexa: no mass, no tenderness and no fullness.   Vagina: No erythema, tenderness, masses, or foreign body in the vagina. No signs of injury around the vagina. No unusual vaginal discharge   Perineum without lesions, signs of injury, erythema or swelling.  Inguinal Canal:        Right: No inguinal adenopathy or hernia present.        Left: No inguinal adenopathy or hernia present.     OBGyn Exam

## 2024-05-21 NOTE — ASSESSMENT & PLAN NOTE
Cholesterol stable and will continue heart healthy diet.    Orders:    Basic metabolic panel; Future    Lipid Panel with Direct LDL reflex; Future

## 2024-10-31 NOTE — PROGRESS NOTES
Diagnoses and all orders for this visit:    Encounter for gynecological examination without abnormal finding    Encounter for screening mammogram for malignant neoplasm of breast  -     Cancel: Mammo screening bilateral w 3d and cad; Future    Postmenopausal      Use Estrace cream as prescribed at our last visit for prevention of frequent UTIs as well as treatment for vulvar Derm.  We again reviewed perineal hygiene at length.  Advised against any liquid soaps body washes or other products to vulvar area  Patient does report that she has changed her routine and that has significantly helped.  Advised to call with any Post Menopausal bleeding.   Calcium/ Vit D dietary requirements discussed,   Weight bearing exercises minium of 150 mins/weekly advised.   Kegel exercises advised daily, see after visit summary for instructions and recommendations  Reviewed perineal hygiene and vaginal dryness post menopause  SBE and yearly mammography advised.  ASCCP guidelines reviewed. Will discontinue PAP's according to recommendations. Condoms encouraged with all sexual activity to prevent STI's.   Health maintenance encouraged with PMD, remain current with Colonoscopy, Dexa scan, and recommended vaccines.  Advised to call with any issues, all concerns & questions addressed.   See after visit summary for further information and recommendations to the above mentioned subjects which we may or may not have covered in detail during your visit     F/U annually or Biannual if Medicare       Health Maintenance:    Last PAP: 06/17/2021   Neg   Next PAP Due: no longer needs Pap smears     Last Mammogram:05/29/2024 @[facility]  Life time Angélica Skelton % not documented , Density B scattered areas of fibroglandular density , Bi-Rads 2 Benign  Next Mammogram:follows with provider at [facility] for orders     Last Colonoscopy:01/18/2017 No longer needed     Last DEXA:  has order pending from primary    Subjective    CC: Yearly Exam     Pleasant 79 y.o. ,  female   postmenopausal here for annual exam.   GYN hx includes: 2 vaginal deliveries ,  left breast cancer with lumpectomy,  frequent UTIs, Vulvar Derm  Family Hx:  No GYN cancers  Pt reports she fell about 3 weeks ago, she landed on right elbow, she does report tenderness in her right breast post fall . Normal clinical exam today, advised to call Dr Bacon her breast specialist and report fall and get further recommendations      Denies history of abnormal pap smears.  Reports abnormal Mammograms hx of Breast cancer   Denies postmenopausal bleeding   Denies issues with vasomotor symptoms  Denies pelvic pain   Reports occasional vulvar itching , prescribed Estrace cream at our previous visit.  She reports she is not using it.  Reports symptoms of pelvic organ prolapse, urinary, or fecal incontinence.   Mentions occasional urinary leakage with cough, laugh, sneeze. Reviewed Kegel's, instructions provided in AVS, if not helpful after 3 months of regular Kegel's we can refer to PT if patient desires.  Is rarely sexually active. Monogamous relationship.   Denies dyspareunia   Denies STI concerns. 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     Past Medical History:   Diagnosis Date    Anxiety     Arthritis     Benign neoplasm of skin     Breast cancer (HCC)     Bursitis     Right hip    Cancer (HCC) 2019    Depression     Diverticulitis     Frequency of urination     HL (hearing loss)     Hypercholesterolemia     IBS (irritable bowel syndrome)     Lyme disease     Morbid obesity (HCC)     Partial small bowel obstruction (HCC)     Prediabetes     Sleep apnea 2018    Small bowel obstruction (HCC) 2021    Urinary tract infection 2022    Varicella     Vitamin D deficiency      Past Surgical History:   Procedure Laterality Date    APPENDECTOMY  1950    BREAST LUMPECTOMY Right     In spring of 2020 as per pt       SECTION  2010    COLECTOMY      COLONOSCOPY      COMPLETE. ,     HERNIA REPAIR      SMALL INTESTINE SURGERY  2012      Family History   Problem Relation Age of Onset    No Known Problems Mother     Cancer Father         Gallbladder    Hypertension Father     No Known Problems Son     Hypotension Son     Breast cancer Neg Hx     Colon cancer Neg Hx     Ovarian cancer Neg Hx     Uterine cancer Neg Hx     Cervical cancer Neg Hx      Social History     Tobacco Use    Smoking status: Never     Passive exposure: Never    Smokeless tobacco: Never   Vaping Use    Vaping status: Never Used   Substance Use Topics    Alcohol use: No    Drug use: No       Current Outpatient Medications:     ascorbic acid (VITAMIN C) 500 mg tablet, Take 1 tablet by mouth every morning, Disp: , Rfl:     aspirin 81 MG tablet, Take 81 mg by mouth daily, Disp: , Rfl:     cholecalciferol (VITAMIN D3) 1,000 units tablet, Take 1 tablet by mouth 2 (two) times a day, Disp: , Rfl:     Docusate Sodium 100 MG capsule, Take 100 mg by mouth 2 (two) times a day 1 time per day, Disp: , Rfl:     escitalopram (LEXAPRO) 20 mg tablet, Take 1 tablet (20 mg total) by mouth daily, Disp: 90 tablet, Rfl: 3    estradiol (ESTRACE) 0.1 mg/g vaginal cream, And apply a pea sized amount to external vaginal opening and urethra area two to three times weekly Do not start before May 23, 2024., Disp: 42.5 g, Rfl: 0    magnesium 30 MG tablet, Take 30 mg by mouth 2 (two) times a day, Disp: , Rfl:     Misc Natural Products (FIBER 7) POWD, Take by mouth daily , Disp: , Rfl:     nystatin-triamcinolone (MYCOLOG-II) ointment, Apply topically 2 (two) times a day 2 times daily or as needed for itching, Disp: 30 g, Rfl: 0    Probiotic Product (PROBIOTIC PO), Take by mouth, Disp: , Rfl:     VITAMIN B COMPLEX-C PO, Take by mouth daily , Disp: , Rfl:     zinc gluconate 50 mg tablet, Take 50 mg by mouth in the morning. As needed., Disp: , Rfl:     nystatin (MYCOSTATIN) powder,  "Apply 1 Application topically 2 (two) times a day as needed (rash) to affected area, Disp: 30 g, Rfl: 3  Patient Active Problem List    Diagnosis Date Noted    Recurrent major depression in full remission ([facility]) 2023    History of breast cancer 2022    Thrombocytopenia ([facility]) 2021    Other irritable bowel syndrome 2020    CASEY (obstructive sleep apnea)     Vitamin D deficiency 07/10/2018    Borderline hyperlipidemia 05/10/2017    Osteopenia of multiple sites 2016    Morbid obesity with BMI of 45.0-49.9, adult ([facility]) 2016    Prediabetes 2014       No Known Allergies    OB History    Para Term  AB Living   2 2 2     2   SAB IAB Ectopic Multiple Live Births           2      # Outcome Date GA Lbr Jaziel/2nd Weight Sex Type Anes PTL Lv   2 Term     M Vag-Spont   RUTH   1 Term     M Vag-Spont   RUTH       Vitals:    24 0747   BP: 120/76   BP Location: Left arm   Patient Position: Sitting   Cuff Size: Large   Weight: 108 kg (237 lb)   Height: 4' 10\" (1.473 m)     Body mass index is 49.53 kg/m².    Review of Systems     Constitutional: Negative for chills, fatigue, fever, headaches, visual disturbances, and unexpected weight change.   Respiratory: Negative for cough, & shortness of breath.  Cardiovascular: Negative for chest pain. .    Gastrointestinal: Negative for Abd pain, nausea & vomiting, constipation and diarrhea.   Genitourinary: Negative for difficulty urinating, dysuria, hematuria, , unusual vaginal bleeding or discharge.   Skin: Negative skin changes    Physical Exam     Constitutional: Alert & Oriented x3, well-developed and well-nourished. No distress.   HENT: Atraumatic, Normocephalic, Conjunctivae clear  Neck: Normal range of motion. Neck supple. No thyromegaly, mass, nodules or tenderness  Pulmonary: Effort normal.   Abdominal: Soft. No tenderness or masses  Musculoskeletal: Normal ROM  Skin: Warm & Dry  Psychological: Normal mood, thought content, " behavior & judgement     Breasts:   Right: tissue soft without masses, tenderness, skin changes or nipple discharge. No areas of erythema or pain. No subclavicular, axillary, pectoral adenopathy  Left:  tissue soft without masses, tenderness, skin changes or nipple discharge. No areas of erythema or pain. No subclavicular, axillary, pectoral adenopathy    Pelvic exam was performed with patient supine, lithotomy position.     Exam is consistent with  atrophic changes.  Vulva/ Vestibule: Right: Negative rash, tenderness, lesion or injury                               Left: Negative rash, tenderness, lesion or injury   Urethral meatus: Negative for  tenderness, inflammation or discharge.   Uterus: not deviated, enlarged, fixed or tender.   Cervix: No CMT, no discharge or friability.   Right adnexa: no mass, no tenderness and no fullness.  Left adnexa: no mass, no tenderness and no fullness.   Vagina: Consistent with  atrophic changes. No erythema, tenderness, masses, or foreign body in the vagina. No signs of injury around the vagina. No unusual vaginal discharge   Perineum without lesions, signs of injury, erythema or swelling.  Inguinal Canal:        Right: No inguinal adenopathy or hernia present.        Left: No inguinal adenopathy or hernia present.

## 2024-11-01 NOTE — PATIENT INSTRUCTIONS
Patient Education     Lowering Your Risk of Breast Cancer   About this topic   Breast cancer is a serious illness. Breast cancer is when abnormal cells grow and divide more quickly in your breast. These cells form a growth or tumor. The abnormal cells may enter nearby tissue and spread to other parts of the body. It is the type of cancer most often seen in women. Men can have breast cancer, but it is a rare condition.  General   Some things in your life may increase your risk of breast cancer. You may not be able to change some of these. Others you can control.  You are more likely to get breast cancer if you:  Have a mother, sister, or daughter who has had breast cancer  Have used hormones for menopause for more than 5 years  Have had radiation therapy to the breast or chest in the past  Are overweight or do not exercise  Had your first menstrual period before you were 11 years old  Went through menopause after age 55  Have never been pregnant or had your first child after age 35  Have had breast cancer before  Drink alcohol in any form  Have dense breasts  Are older in age  There is no certain way to prevent breast cancer. There are things you can do to lower your chances of having breast cancer.  Keep a healthy weight. Lose weight if you are overweight. Being overweight raises your chances of having breast cancer.  Eat a healthy diet to maintain a healthy weight, such as more fruits, vegetables, and lean cuts of meat. Decrease the amount of saturated fat in your diet.  Exercise. Being active helps you keep a healthy weight.  Limit your alcohol intake or do not drink alcohol. The more alcohol you drink, the higher your risk.  Do not smoke cigarettes. Smoking can increase your risk of many types of cancer.  Breastfeed your baby. This may help protect you. The longer you breastfeed, the more protection you have.  Talk with your doctor about:  Limiting or stopping hormone therapy.  Taking certain drugs to prevent  breast cancer. For women at high risk of having breast cancer, there are a few drugs that may lower your risk.  Surgery to prevent you from having breast cancer if you are very high risk.  When do I need to call the doctor?   Changes in your breasts  A lump or area in your breast that feels different  Discharge from your nipple  Skin on your breast is dimpled or indented  You have questions or concerns about your breasts  Helpful tips   Talk to your doctor about the best kind of breast cancer screening for you.  If you want to do self breast exams, have your doctor show you the right way to do them.  Tell your doctor of any abnormal finding.  Last Reviewed Date   2021-10-04  Consumer Information Use and Disclaimer   This generalized information is a limited summary of diagnosis, treatment, and/or medication information. It is not meant to be comprehensive and should be used as a tool to help the user understand and/or assess potential diagnostic and treatment options. It does NOT include all information about conditions, treatments, medications, side effects, or risks that may apply to a specific patient. It is not intended to be medical advice or a substitute for the medical advice, diagnosis, or treatment of a health care provider based on the health care provider's examination and assessment of a patient’s specific and unique circumstances. Patients must speak with a health care provider for complete information about their health, medical questions, and treatment options, including any risks or benefits regarding use of medications. This information does not endorse any treatments or medications as safe, effective, or approved for treating a specific patient. Up[employer] and its affiliates disclaim any warranty or liability relating to this information or the use thereof. The use of this information is governed by the Terms of Use, available at  https://www.woltersTransmensionuwer.com/en/know/clinical-effectiveness-terms   Copyright   Copyright © 2024 [employer]To[employer], Inc. and its affiliates and/or licensors. All rights reserved.       Perineal Hygiene      Your vaginal naturally takes care of its self, it is a self washing system, the less you mess the healthier it will be     No soaps or feminine wash to the vulva, these products can cause dermitis, bacterial infections and other vulvar problems.   Use only water to cleanse, or water with Dove or Dove Sensitive Skin Bar soap if necessary.    Avoid the use of washcloths, exfoliating cheryl's, netted scrubbers, loofa's, use your hands only to cleanse the vulvar tissues    No scented lotions or products are advised in or near your vulva.    Use coconut oil in its solid form for moisture if needed.  No douching this may cause imbalance in your vaginal PH and further issues.    If you wear panty liners, you may apply a thin coating of Vaseline, A&D ointment or coconut oil to the vulvar tissues as a skin barrier     Cotton underware, loose fitting clothing  Only perfume-free, dye-free laundry detergent, use a second rinse cycle   Avoid fabric softeners/dryer sheets.       Your partner should avoid the same products as well.       Over the counter probiotic to restore vaginal sammi may be helpful as well, take daily.       You may also look into Boric Acid vaginal suppositories to restore vaginal PH balance for up to 2 weeks as directed on the box. You may not use these if you are pregnant      For vaginal dryness:      You may use:     Coconut oil in solid form, not liquid (organic, pure, unscented) as needed for moisture or lubrication. ( Do not use if allergic)       Replens moisture restore external comfort gel daily ( use as directed on the box)        Replens long lasting vaginal moisturizer  ( use as directed on the box)     May try Qazzow vulvar moisturizer ( found on Amazon )    May try Revaree vaginal inserts        For  Vaginal Lubrication:          You may use:     Coconut oil in solid form, not liquid (organic, pure, unscented) as a lubricant or another scent-free lubricant (Astroglide, Uberlube) if needed.  Do not use coconut oil or silicone if using a condom as this may break down the integrity of the condom and cause an unplanned pregnancy              Do not use coconut oil if allergic               Replens silky smooth lubricant, premium silicone based lubricant for intercourse. ( use as directed, a small amount will provide an enhanced natural feeling)     Any premium over the counter vaginal lubricant water or silicone based. Silicone based will have more staying power.        For Vulvar irritation/itching:        You may use Hydrocortisone 1 % over the counter to external vulvar tissues 2 x daily for 5-7 days to help with irriation and itch relief.  Patient Education     Pelvic Floor Exercises   About this topic   The pelvic floor consists of muscles and strong bands of tissues which support all of the organs in your pelvis. Some of these organs are the bladder and the small and large bowel as well as the womb and the prostate. If the muscles and tissues get weak, your organs may drop. This can lead to other problems. Your urine may leak when you laugh, sneeze, or cough. You may not be able to drain the bladder fully. You may have less problems if you do exercises to strengthen your pelvic floor and abdominal muscles.  Kegel exercises help make the muscles in the pelvic floor stronger. Anyone can do them. It is also important to make your abdominal muscles stronger. In order for these exercises to work, you must be consistent when doing them.  General   Before starting with a program, ask your doctor if you are healthy enough to do these exercises. Your doctor may have you work with a  or physical therapist to make a safe exercise program to meet your needs.  Strengthening Exercises   Kegel exercises keep your  pelvic muscles firm and strong. You can do these in many different positions. Start by lying down with your knees bent and feet on the bed. Squeeze the pelvic muscles as if you are trying to stop the flow of urine. Hold these muscles for a count of 3, and then slowly relax them for a count of 3. Try to work up to squeezing for a count of 10 and slowly relaxing for a count of 10. Increase the muscle squeeze until you get to 10. When relaxing, slowly relax to a count of 10.  Breathe out when you are squeezing and breathe in when you are relaxing. Your goal is to try to do 10 Kegels 3 or more times each day. Take time to rest between sets. Be sure to only contract your pelvic floor muscles, not your buttocks, thighs, or abdominal muscles.  Pelvic floor contractions ? There are a few ways to feel the pelvic muscles contract.  When you are passing urine, try suddenly stopping your flow of urine. Do not do this on a regular basis, but only to feel what the contraction feels like. Doing this while passing urine can lead to other problems.  Put a finger into the vagina or rectum. Contract the muscles around your finger as if you were trying to stop the flow of urine or stop the passing of gas.  Place two chairs without arms about 2 inches (5 cm) apart. Sit so you have one butt cheek on each chair. Now, try rosalba your pelvic floor muscles. This will help you keep from using other muscles.  Pelvic tilts ? Lie on your back with your knees bent and feet flat on the floor. Tighten your stomach muscles and press your lower back down to the floor. Try doing Kegels with this exercise when your back is flattened. Hold 3 to 5 seconds. Relax.  Straight leg raises lying down ? Lie on your back with one leg straight. Bend your other knee so the foot is flat on the bed. Keeping your leg straight, lift the leg up to the level of your other knee. Lower it back down. Repeat with the other leg.  Hip lifts ? Lie on your back with your  knees bent and feet flat on the floor. Tighten your stomach muscles and lift your buttocks off the floor. Try doing Kegels when up in this position. Hold 3 to 5 seconds. Relax.  Abdominal bracing ? Do this exercise in different positions: Lying down, sitting, and standing. Tighten your stomach muscles. While keeping the stomach muscles tight, tighten your pelvic floor muscles. Now, forcefully laugh or cough to see if you were able to prevent urine from leaking.  Abdominal crunches ? Lie on your back with both knees bent. Keep your feet flat on the floor. Place your hands in one of these positions. Try starting with the first position since it is the easiest. As you get better, use the other positions to make it harder.  Crunches with arms at sides.  Crunches with arms across chest.  Crunches with arms behind head. Be careful not to interlock your fingers behind your neck or head while doing crunches. This may add tension to your neck and cause strain.  Look at the ceiling. Tighten your belly muscles and lift your shoulders and upper back off the floor. Breathe out while you are doing this. Lower your shoulders to the floor. Breathe in while you are doing this. Relax your belly muscles all the way before starting another crunch.             What will the results be?   Less leakage of urine when you cough, sneeze, laugh, or run  Fewer strong urges to pass urine  Fewer trips to the bathroom each day  Less risk of organs, such as the uterus or bladder, dropping into the vagina  Faster recovery after childbirth or prostate surgery  Stronger core muscles  Increased sensitivity during sex  Helpful tips   You can also try doing a different kind of Kegels. Do 5 quick, strong pelvic floor contractions. Sometimes, if you have an urge to pass urine but are not near a bathroom, you can do this kind of Kegel to calm the urge.  Stay active and work out to keep your muscles strong and flexible.  Keep a healthy weight to avoid  putting too much stress on your spine. Eat a healthy diet to keep your muscles healthy.  Be sure you do not hold your breath when exercising. This can raise your blood pressure. If you tend to hold your breath, try counting out loud when exercising. If any exercise bothers you, stop right away.  Try walking or cycling at an easy pace for a few minutes to warm up your muscles. Do this again after exercising.  Doing exercises before a meal may be a good way to get into a routine. A good time to do these exercises is each time you are stopped at a stop light while driving.  Exercise may be slightly uncomfortable, but you should not have sharp pains. If you do get sharp pains, stop what you are doing. If the sharp pains continue, call your doctor.  Last Reviewed Date   2021-03-31  Consumer Information Use and Disclaimer   This generalized information is a limited summary of diagnosis, treatment, and/or medication information. It is not meant to be comprehensive and should be used as a tool to help the user understand and/or assess potential diagnostic and treatment options. It does NOT include all information about conditions, treatments, medications, side effects, or risks that may apply to a specific patient. It is not intended to be medical advice or a substitute for the medical advice, diagnosis, or treatment of a health care provider based on the health care provider's examination and assessment of a patient’s specific and unique circumstances. Patients must speak with a health care provider for complete information about their health, medical questions, and treatment options, including any risks or benefits regarding use of medications. This information does not endorse any treatments or medications as safe, effective, or approved for treating a specific patient. UpToDate, Inc. and its affiliates disclaim any warranty or liability relating to this information or the use thereof. The use of this information is  "governed by the Terms of Use, available at https://www.DealTractionuwer.com/en/know/clinical-effectiveness-terms   Copyright   Copyright © 2024 UpToDate, Inc. and its affiliates and/or licensors. All rights reserved.  Patient Education     Menopause   The Basics   Written by the doctors and editors at [facility]   What is menopause? -- Menopause is the time in life when monthly periods naturally stop. At this time, the ovaries stop releasing eggs and stop making the hormones estrogen and progesterone.  Menopause usually occurs between the ages of 45 and 55. The average age is 51.  Menopause does not happen all at once. It is a \"transition\" that takes years. It can cause symptoms that are difficult for a lot of people. But there are treatments that can help.  How do I know if I am going through menopause? -- You might wonder about menopause when your periods start to change. If you are going through menopause, you might:   Have periods more or less often than usual (for example, every 5 to 6 weeks instead of every 4)   Have shorter periods than before   Skip 1 or more periods   Have symptoms like hot flashes  If you have had a hysterectomy (surgery to remove your uterus), but you still have your ovaries, it might be tough to tell when you are going through menopause. Still, you can have menopause symptoms even if you no longer have a uterus.  If your ovaries were removed before the usual age of menopause, you had what doctors call \"surgical menopause.\" That just means that you went through it early, because your ovaries were removed.  What are the symptoms of menopause? -- Some people go through menopause without symptoms. But most have 1 or more of these symptoms:   Hot flashes - Hot flashes feel like a wave of heat that starts in your chest and face and then moves through your body. Hot flashes usually start happening before you stop having periods.   Night sweats - When hot flashes happen during sleep, they are called " "\"night sweats.\" They can make it hard to get a good night's sleep.   Sleep problems - During the transition to menopause, some people have trouble falling or staying asleep. This can happen even if night sweats are not a problem.   Vaginal dryness - Menopause can cause the vagina and tissues near the vagina to become dry and thin. This usually starts a few years after menopause. It can be uncomfortable or make sex painful.   Depression - During the transition to menopause, many people start having symptoms of depression or anxiety. This is more likely if you have had depression before. Depression symptoms include:   Sadness   Losing interest in doing things   Sleeping too much or too little   Trouble concentrating or remembering things - This might be caused by lack of sleep that often happens at menopause, or by the lack of estrogen. Some experts suspect that estrogen is important for good brain function.   Headaches - If you get migraine headaches related to your period, these might get worse during menopause.   Joint pain - Some people have joint aches or pains during and after menopause.  Many of these symptoms get better after menopause. But some people continue to have hot flashes, even for years afterwards.  Should I see a doctor or nurse? -- It depends. If your periods start changing and you are 45 or older, you do not need to see your doctor for this reason alone. But you should tell them if you have symptoms that bother you.  For example, see your doctor if you cannot sleep because of night sweats, if it is hard to work because of your hot flashes, or if you feel sad or down and don't seem to enjoy things anymore. If your regular doctor cannot recommend treatments that can help, you might consider looking for a doctor who is a specialist in menopause or women's health. They might have more information about ways to relieve symptoms.  You should also see a doctor or nurse if you:   Have your period more " "often than every 3 weeks   Have very heavy bleeding during your period   Have bleeding or \"spotting\" between periods   Have been through menopause (have gone 12 months without a period) and start bleeding again, even if it's just a spot of blood  Is there a test for menopause? -- There is a test that can help tell if you are going through menopause. But doctors usually use this only in people who are too young to be in menopause or who have special circumstances.  Can I still get pregnant? -- As long as you are still having periods, even if they do not happen often, it is possible to get pregnant. If you have sex and do not want to get pregnant, use some form of birth control.  If you have not had a period for a full year, it is probably safe to say you have been through menopause and can no longer get pregnant.  How are the symptoms of menopause treated? -- There are treatments that can help relieve symptoms.  Treatments for hot flashes include:   Hormone therapy - The hormone estrogen is the most effective treatment for menopause symptoms. Most people need to take estrogen with another hormone, called progesterone. People who have had a hysterectomy (surgery to remove the uterus) can take estrogen by itself. Experts think that these hormones are effective and safe for most people.  If you want to take hormones, ask your doctor or nurse if it is an option for you. You should not take hormones if you have had breast cancer, a heart attack, a stroke, or a blood clot.   Antidepressants - Some types of antidepressants can ease hot flashes and depression. Even if you do not have depression, these medicines can still help with hot flashes. They are often used by people who have had breast cancer and cannot take estrogen.   Anti-seizure medicine - One of the medicines used to prevent seizures seems to help some people with hot flashes, even if they do not have seizures.  Treatments for vaginal dryness include:   Vaginal " "estrogen - Vaginal estrogen is any form of estrogen that goes directly into the vagina. It comes in creams, tablets, or a flexible ring. Vaginal estrogen comes in small doses that don't increase the levels of estrogen in other parts of the body very much.   Other medicines - In most cases, doctors recommend vaginal estrogen. That's because there is more evidence that it helps with vaginal dryness compared with other medicines. But if you do not want to use vaginal estrogen, your doctor can suggest other options. For example:   You might choose to use a vaginal moisturizer several times a week. Vaginal moisturizers (sample brand names: Replens, K-Y SILK-E) do not contain hormones. They help keep the vagina moist all of the time. You can also add a lubricant (sample brand names: Astroglide, K-Y Jelly) when you have sex.   In some cases, doctors might suggest another medicine. For example, there is a tablet that you insert into the vagina once a day. There is also a pill that might help some people who cannot use vaginal products.  Some doctors are not used to prescribing hormone therapy for menopause. If you feel that you are not getting the help you need, you might choose to talk to a different doctor who is an expert in menopause treatment. You can ask your doctor or nurse to refer you to someone.  Can I do anything on my own to reduce the symptoms of menopause? -- Yes. There are some steps you can try (table 1). But ask your doctor before you take any \"natural remedies,\" especially if you have a history of breast cancer. Things like herbs and supplements are not proven to help, and in some cases, could harm you.  What can I do to protect my bones? -- You can:   Take calcium and vitamin D supplements.   Be active (physical activity helps keep bones strong).   Ask your doctor when you should start having bone density tests.  If needed, your doctor can prescribe medicines to help keep your bones strong.  All topics " are updated as new evidence becomes available and our peer review process is complete.  This topic retrieved from [employer] on: Feb 26, 2024.  Topic 30249 Version 11.0  Release: 32.2.4 - C32.56  © 2024 UpToDate, Inc. and/or its affiliates. All rights reserved.  table 1: Ways to cope with menopause symptoms  Symptom  What you can do    Hot flashes and night sweats Dress in layers so you can take off clothes if you get hot.    Keep your home at a comfortable temperature. Avoid hot drinks, such as coffee and tea.    Put a cold, wet washcloth against your neck during hot flashes.    Quit smoking, if you smoke. (Smoking makes hot flashes worse.) If you are having trouble quitting, your doctor or nurse can help.   Vaginal dryness Use a vaginal moisturizer a few times a week (sample brand names: Replens, K-Y SILK-E).    Use a lubricant before sex (sample brand names: Astroglide, K-Y Jelly).   Sleep problems Try to go to sleep and get up at the same time every day, even when you don't sleep well.    Avoid caffeine in the afternoon, and limit alcohol.   Depression Try to stay active. Exercise can help your mood.    Seek support from other people going through menopause.   Graphic 67694 Version 4.0  Consumer Information Use and Disclaimer   Disclaimer: This generalized information is a limited summary of diagnosis, treatment, and/or medication information. It is not meant to be comprehensive and should be used as a tool to help the user understand and/or assess potential diagnostic and treatment options. It does NOT include all information about conditions, treatments, medications, side effects, or risks that may apply to a specific patient. It is not intended to be medical advice or a substitute for the medical advice, diagnosis, or treatment of a health care provider based on the health care provider's examination and assessment of a patient's specific and unique circumstances. Patients must speak with a health care provider  "for complete information about their health, medical questions, and treatment options, including any risks or benefits regarding use of medications. This information does not endorse any treatments or medications as safe, effective, or approved for treating a specific patient. UpToDate, Inc. and its affiliates disclaim any warranty or liability relating to this information or the use thereof.The use of this information is governed by the Terms of Use, available at https://www.myNoticePeriod.comuwer.com/en/know/clinical-effectiveness-terms. 2024© UpToDate, Inc. and its affiliates and/or licensors. All rights reserved.  Copyright   © 2024 UpTo[employer], Inc. and/or its affiliates. All rights reserved.  Patient Education     Urinary incontinence in females   The Basics   Written by the doctors and editors at [employer]   What is urinary incontinence? -- Urinary incontinence is the medical term for when a person leaks urine or loses bladder control.  Incontinence is a very common problem, but it is not a normal part of aging. If you have this problem, there are treatments that can help. There are also things that you can do on your own to stop or reduce urine leakage so you don't have to \"just live with it.\"  What are the symptoms of incontinence? -- There are different types of incontinence. Each causes different symptoms. The 3 most common types are:   Stress incontinence - With stress incontinence, you leak urine when you laugh, cough, sneeze, or do anything that \"stresses\" the belly. Stress incontinence is most common in females, especially those who have had a baby.   Urgency incontinence - With urgency incontinence, you feel a strong need to urinate all of a sudden. This is also known as \"urge incontinence.\" Often, the \"urge\" is so strong that you can't make it to the bathroom in time. \"Overactive bladder\" is another term for having a sudden, frequent urge to urinate. People with overactive bladder might or might not actually " "leak urine.   Mixed incontinence - With mixed incontinence, you have symptoms of both stress and urgency incontinence.  Is there anything I can do on my own to feel better? -- Yes. Here are some things that can help reduce urine leaks:   Reduce the amount of liquid that you drink, especially a few hours before bed.   Cut down on any foods or drinks that make your symptoms worse. Some people find that alcohol, caffeine, or spicy or acidic foods irritate the bladder.   Try to lose weight, if you are overweight. Your doctor or nurse can help you do this in a healthy way.   If you have diabetes, keep your blood sugar as close to your goal level as possible.   If you take medicines called diuretics, plan ahead. These medicines increase the need to urinate. Try to take them when you know you will be near a bathroom for a few hours. If you keep having problems with leakage because of diuretics, ask your doctor if you can take a lower dose or switch to a different medicine.  These techniques can also help improve bladder control:   Bladder retraining - During bladder retraining, you go to the bathroom at scheduled times. For instance, you might decide that you will go every hour. Make yourself go every hour, even if you don't feel like you need to. Try to wait the whole hour, even if you need to go sooner. Then, once you get used to going every hour, increase the amount of time you wait in between bathroom visits. Over time, you might be able to \"retrain\" your bladder to wait 3 or 4 hours between bathroom visits.   Pelvic floor muscle training - This involves learning exercises to strengthen and relax your pelvic muscles. These include the muscles that control the flow of urine and bowel movements. When done right, these exercises can help. But people often do them wrong. Ask your doctor or nurse how to do them right. Your doctor might suggest working with a physical therapist who has special training in these " exercises.  Should I see my doctor or nurse? -- Yes. Your doctor or nurse can find out what might be causing your incontinence. They can also suggest ways to relieve the problem.  When you speak to your doctor or nurse, ask if any of the medicines you take could be causing your symptoms. Some medicines can cause incontinence or make it worse.  Some people choose to wear pads or special underwear. These can help if you accidentally leak urine once in a while. But they can also cause skin irritation if you use them a lot. If you have incontinence, ask your doctor or nurse how to treat it.  How is incontinence treated? -- The treatment options differ depending on what type of incontinence you have. Some of the options include:   Medicines to relax the bladder   Surgery to repair the tissues that support the bladder or to improve the flow of urine   Electrical stimulation of the nerves that relax the bladder  Urinary incontinence is more common in people who have been through menopause. (Menopause is when you stop having monthly periods). Some people have vaginal dryness after menopause. If this is the case for you, a treatment called vaginal estrogen might help.  What will my life be like? -- Many people with incontinence can regain bladder control or at least reduce the amount of leakage they have. The most important thing is to tell your doctor or nurse. Then, work with them to find an approach that helps you.  All topics are updated as new evidence becomes available and our peer review process is complete.  This topic retrieved from [employer] on: Feb 26, 2024.  Topic 00430 Version 18.0  Release: 32.2.4 - C32.56  © 2024 UpToDate, Inc. and/or its affiliates. All rights reserved.  figure 1: Location of the bladder     This drawing shows the side view of a woman's body. The bladder is in front of the vagina. The urethra is the tube that carries urine from the bladder out of the body.  Graphic 647075 Version 1.0  Consumer  Information Use and Disclaimer   Disclaimer: This generalized information is a limited summary of diagnosis, treatment, and/or medication information. It is not meant to be comprehensive and should be used as a tool to help the user understand and/or assess potential diagnostic and treatment options. It does NOT include all information about conditions, treatments, medications, side effects, or risks that may apply to a specific patient. It is not intended to be medical advice or a substitute for the medical advice, diagnosis, or treatment of a health care provider based on the health care provider's examination and assessment of a patient's specific and unique circumstances. Patients must speak with a health care provider for complete information about their health, medical questions, and treatment options, including any risks or benefits regarding use of medications. This information does not endorse any treatments or medications as safe, effective, or approved for treating a specific patient. UpToDate, Inc. and its affiliates disclaim any warranty or liability relating to this information or the use thereof.The use of this information is governed by the Terms of Use, available at https://www.[employer].com/en/know/clinical-effectiveness-terms. 2024© UpToDate, Inc. and its affiliates and/or licensors. All rights reserved.  Copyright   © 2024 Up[employer]Date, Inc. and/or its affiliates. All rights reserved.  Patient Education     Vaginal dryness   The Basics   Written by the doctors and editors at [employer]   What is vaginal dryness? -- Vaginal dryness is when the vagina and vulva get dry, thin, and inflamed. (The vulva is the area around the vagina.) This can be uncomfortable or make sex painful. It happens when your body does not make enough of a hormone called estrogen. This is often related to menopause (when you stop having a monthly period). It can also happen if your ovaries were removed, if you take certain  "medicines, or if you are breastfeeding.  Medical terms for vaginal dryness include \"vaginal atrophy,\" \"vulvovaginal atrophy,\" and \"atrophic vaginitis.\" If your symptoms are related to menopause, your doctor might also use the term \"genitourinary syndrome of menopause,\" or \"GSM.\"  What other symptoms can happen? -- Some people have other symptoms in addition to dryness. These symptoms can include:   Vaginal burning or irritation   Making less lubrication (wetness) during sex   Pain during sex   Bleeding when something touches or rubs the vagina, such as after sex. (If you have this symptom, see a doctor.)   Discharge or fluid from the vagina   Urinary problems, such as having to urinate often, having pain with urination, or leaking urine. (If you have these symptoms, see a doctor.)  Should I see a doctor or nurse? -- See your doctor or nurse if you have vaginal dryness or related symptoms that bother you.  Some people never tell their doctor that they are having symptoms. Often, they are embarrassed or think that the symptoms are a normal part of aging. But vaginal dryness is a common medical issue, and there are treatments that can help.  Is there anything I can do on my own to feel better? -- Yes. Some people feel better if they use lubricants before sex and use a vaginal moisturizer several times a week. Vaginal moisturizers (sample brand names: Replens, K-Y SILK-E) are not the same as lubricants. They help keep the vagina moist all of the time, not just during sex.  You should also practice good hygiene for the vagina and vulva. This means avoiding bubble baths, douching, and using scented soaps or lotions in your genital area. These can cause dryness or irritation.  Can I still have sex? -- Yes, if it is not uncomfortable. Regular sexual activity, with a partner or by yourself, can help to keep the tissues in your vagina more elastic. But if sex is uncomfortable or painful, you should see a doctor.  How is " "vaginal dryness treated? -- The most effective treatment for vaginal dryness is the hormone estrogen. In this situation, doctors recommend \"vaginal estrogen.\" This means any form of estrogen that goes directly into the vagina. It comes in creams, tablets, or a flexible ring. Vaginal estrogen comes in small doses, so it does not increase the levels of estrogen in other parts of the body very much.  Estrogen also comes in higher doses. These forms come as a pill, skin patch, or different vaginal ring. These are sometimes called \"hormone therapy.\" Vaginal estrogen is better for treating symptoms of vaginal dryness. But if you have other menopause symptoms, such as hot flashes or night sweats, higher-dose estrogen might be an option. Your doctor or nurse can talk to you about this. There are different risks and benefits, and some people cannot safely take high doses of hormones.  Besides estrogen, medicines that can treat vaginal dryness include:   Ospemifene (brand name: Osphena) - This is similar to estrogen, but is not estrogen. It comes as a pill that you take once a day. It can cause hot flashes.   Prasterone - This is also called \"DHEA.\" It is a medicine that you put into your vagina once a day. It comes as a \"suppository.\" A suppository is similar to a tablet or pill but goes directly into the vagina.  Other treatments are also available, but are not used as often.  What problems should I watch for? -- Call your doctor or nurse for advice if:   Your symptoms are not getting better with treatment or are getting worse.   You have signs of a urinary tract infection - These might include a fever of 100.4°F (38°C) or higher, chills, pain or burning when urinating, or blood in your urine.  All topics are updated as new evidence becomes available and our peer review process is complete.  This topic retrieved from [employer] on: Feb 26, 2024.  Topic 32990 Version 15.0  Release: 32.2.4 - C32.56  © 2024 UpToDate, Inc. and/or " its affiliates. All rights reserved.  Consumer Information Use and Disclaimer   Disclaimer: This generalized information is a limited summary of diagnosis, treatment, and/or medication information. It is not meant to be comprehensive and should be used as a tool to help the user understand and/or assess potential diagnostic and treatment options. It does NOT include all information about conditions, treatments, medications, side effects, or risks that may apply to a specific patient. It is not intended to be medical advice or a substitute for the medical advice, diagnosis, or treatment of a health care provider based on the health care provider's examination and assessment of a patient's specific and unique circumstances. Patients must speak with a health care provider for complete information about their health, medical questions, and treatment options, including any risks or benefits regarding use of medications. This information does not endorse any treatments or medications as safe, effective, or approved for treating a specific patient. UpToDate, Inc. and its affiliates disclaim any warranty or liability relating to this information or the use thereof.The use of this information is governed by the Terms of Use, available at https://www.[employer].com/en/know/clinical-effectiveness-terms. 2024© UpToDate, Inc. and its affiliates and/or licensors. All rights reserved.  Copyright   © 2024 UpToDate, Inc. and/or its affiliates. All rights reserved.

## 2024-11-05 ENCOUNTER — ANNUAL EXAM (OUTPATIENT)
Dept: OBGYN CLINIC | Facility: CLINIC | Age: 79
End: 2024-11-05
Payer: COMMERCIAL

## 2024-11-05 VITALS
HEIGHT: 58 IN | BODY MASS INDEX: 49.75 KG/M2 | SYSTOLIC BLOOD PRESSURE: 120 MMHG | WEIGHT: 237 LBS | DIASTOLIC BLOOD PRESSURE: 76 MMHG

## 2024-11-05 DIAGNOSIS — Z01.419 ENCOUNTER FOR GYNECOLOGICAL EXAMINATION WITHOUT ABNORMAL FINDING: Primary | ICD-10-CM

## 2024-11-05 DIAGNOSIS — Z12.31 ENCOUNTER FOR SCREENING MAMMOGRAM FOR MALIGNANT NEOPLASM OF BREAST: ICD-10-CM

## 2024-11-05 DIAGNOSIS — Z78.0 POSTMENOPAUSAL: ICD-10-CM

## 2024-11-05 PROCEDURE — G0101 CA SCREEN;PELVIC/BREAST EXAM: HCPCS | Performed by: OBSTETRICS & GYNECOLOGY

## 2024-11-07 ENCOUNTER — APPOINTMENT (OUTPATIENT)
Age: 79
End: 2024-11-07
Payer: COMMERCIAL

## 2024-11-07 DIAGNOSIS — E78.5 BORDERLINE HYPERLIPIDEMIA: ICD-10-CM

## 2024-11-07 DIAGNOSIS — D69.6 THROMBOCYTOPENIA (HCC): ICD-10-CM

## 2024-11-07 DIAGNOSIS — R73.03 PREDIABETES: ICD-10-CM

## 2024-11-07 LAB
ANION GAP SERPL CALCULATED.3IONS-SCNC: 6 MMOL/L (ref 4–13)
BUN SERPL-MCNC: 15 MG/DL (ref 5–25)
CALCIUM SERPL-MCNC: 9.1 MG/DL (ref 8.4–10.2)
CHLORIDE SERPL-SCNC: 104 MMOL/L (ref 96–108)
CHOLEST SERPL-MCNC: 175 MG/DL
CO2 SERPL-SCNC: 31 MMOL/L (ref 21–32)
CREAT SERPL-MCNC: 0.66 MG/DL (ref 0.6–1.3)
ERYTHROCYTE [DISTWIDTH] IN BLOOD BY AUTOMATED COUNT: 13.4 % (ref 11.6–15.1)
EST. AVERAGE GLUCOSE BLD GHB EST-MCNC: 128 MG/DL
GFR SERPL CREATININE-BSD FRML MDRD: 84 ML/MIN/1.73SQ M
GLUCOSE P FAST SERPL-MCNC: 111 MG/DL (ref 65–99)
HBA1C MFR BLD: 6.1 %
HCT VFR BLD AUTO: 41.1 % (ref 34.8–46.1)
HDLC SERPL-MCNC: 49 MG/DL
HGB BLD-MCNC: 13.7 G/DL (ref 11.5–15.4)
LDLC SERPL CALC-MCNC: 94 MG/DL (ref 0–100)
MCH RBC QN AUTO: 29.9 PG (ref 26.8–34.3)
MCHC RBC AUTO-ENTMCNC: 33.3 G/DL (ref 31.4–37.4)
MCV RBC AUTO: 90 FL (ref 82–98)
PLATELET # BLD AUTO: 137 THOUSANDS/UL (ref 149–390)
PMV BLD AUTO: 11.3 FL (ref 8.9–12.7)
POTASSIUM SERPL-SCNC: 4.1 MMOL/L (ref 3.5–5.3)
RBC # BLD AUTO: 4.58 MILLION/UL (ref 3.81–5.12)
SODIUM SERPL-SCNC: 141 MMOL/L (ref 135–147)
TRIGL SERPL-MCNC: 159 MG/DL
WBC # BLD AUTO: 6.2 THOUSAND/UL (ref 4.31–10.16)

## 2024-11-07 PROCEDURE — 80061 LIPID PANEL: CPT

## 2024-11-07 PROCEDURE — 85027 COMPLETE CBC AUTOMATED: CPT

## 2024-11-07 PROCEDURE — 80048 BASIC METABOLIC PNL TOTAL CA: CPT

## 2024-11-07 PROCEDURE — 36415 COLL VENOUS BLD VENIPUNCTURE: CPT

## 2024-11-07 PROCEDURE — 83036 HEMOGLOBIN GLYCOSYLATED A1C: CPT

## 2024-11-12 ENCOUNTER — OFFICE VISIT (OUTPATIENT)
Age: 79
End: 2024-11-12
Payer: COMMERCIAL

## 2024-11-12 ENCOUNTER — IMMUNIZATIONS (OUTPATIENT)
Age: 79
End: 2024-11-12
Payer: COMMERCIAL

## 2024-11-12 VITALS
OXYGEN SATURATION: 97 % | HEIGHT: 58 IN | SYSTOLIC BLOOD PRESSURE: 118 MMHG | DIASTOLIC BLOOD PRESSURE: 58 MMHG | WEIGHT: 231.4 LBS | TEMPERATURE: 97.3 F | BODY MASS INDEX: 48.57 KG/M2 | HEART RATE: 67 BPM

## 2024-11-12 DIAGNOSIS — E78.5 BORDERLINE HYPERLIPIDEMIA: ICD-10-CM

## 2024-11-12 DIAGNOSIS — R73.03 PREDIABETES: Primary | Chronic | ICD-10-CM

## 2024-11-12 DIAGNOSIS — D69.6 THROMBOCYTOPENIA (HCC): ICD-10-CM

## 2024-11-12 DIAGNOSIS — Z23 ENCOUNTER FOR IMMUNIZATION: Primary | ICD-10-CM

## 2024-11-12 DIAGNOSIS — E55.9 VITAMIN D DEFICIENCY: ICD-10-CM

## 2024-11-12 DIAGNOSIS — K58.8 OTHER IRRITABLE BOWEL SYNDROME: ICD-10-CM

## 2024-11-12 PROCEDURE — 99214 OFFICE O/P EST MOD 30 MIN: CPT | Performed by: INTERNAL MEDICINE

## 2024-11-12 PROCEDURE — 90662 IIV NO PRSV INCREASED AG IM: CPT

## 2024-11-12 PROCEDURE — G0008 ADMIN INFLUENZA VIRUS VAC: HCPCS

## 2024-11-12 RX ORDER — DICYCLOMINE HYDROCHLORIDE 10 MG/1
10 CAPSULE ORAL
Qty: 30 CAPSULE | Refills: 3 | Status: SHIPPED | OUTPATIENT
Start: 2024-11-12

## 2024-11-12 NOTE — PROGRESS NOTES
"Ambulatory Visit  Name: Yi Foy      : 1945      MRN: [id number]  Encounter Provider: Garry Gama DO  Encounter Date: 2024   Encounter department: [facility] [place]    Assessment & Plan  Prediabetes    Work on diet and lifestyle changes. Continue to monitor A1c.    Orders:    Hemoglobin A1C; Future    Borderline hyperlipidemia    Stable from previous. Heart healthy diet advised. Exercise is limited by her knee problems.    Orders:    CBC; Future    Basic metabolic panel; Future    Lipid Panel with Direct LDL reflex; Future    Thrombocytopenia (HCC)    Chronic and stable. Will continue to monitor. No bleeding episodes.  Vitamin D deficiency    Orders:    Vitamin D 25 hydroxy; Future    Other irritable bowel syndrome    Orders:    dicyclomine (BENTYL) 10 mg capsule; Take 1 capsule (10 mg total) by mouth 3 (three) times a day before meals         History of Present Illness     History of Present Illness  The patient presents for a routine follow-up.    She reports a history of bowel complications, including twisted bowels and two hernia surgeries. She experiences discomfort when her bowels tighten. Dicyclomine has been prescribed to manage these symptoms. 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 However, the most recent injection did not alleviate her pain. She has not tried gel injections and expresses reluctance towards surgical intervention. She maintains a healthy diet and avoids junk food. Her previous exercise routine included extensive walking.     Review of Systems   Constitutional: Negative.    Respiratory: Negative.     Cardiovascular: Negative.    Gastrointestinal: Negative.    Musculoskeletal:  Positive for arthralgias.     Objective     /58   Pulse 67   Temp (!) 97.3 °F (36.3 °C) (Tympanic)   Ht 4' 10\" (1.473 m)   Wt 105 kg (231 lb 6.4 oz)   SpO2 97%   BMI 48.36 kg/m²     Physical " Exam  Constitutional:       General: She is not in acute distress.     Appearance: She is obese. She is not ill-appearing.   Cardiovascular:      Rate and Rhythm: Normal rate and regular rhythm.      Heart sounds: No murmur heard.  Pulmonary:      Effort: Pulmonary effort is normal. No respiratory distress.      Breath sounds: No wheezing.   Abdominal:      General: Abdomen is protuberant. Bowel sounds are normal. There is no distension.      Tenderness: There is no abdominal tenderness.   Musculoskeletal:      Right lower leg: No edema.      Left lower leg: No edema.   Neurological:      Mental Status: She is alert.       Garry Nottein, DO

## 2024-11-12 NOTE — PATIENT INSTRUCTIONS
"Patient Education     Weight Loss Diet   About this topic   There are many \"trendy\" weight loss diets that are popular today. Many of these diets can end up being more harmful than helpful. The healthiest way to lose weight is to burn more calories than you eat.  A weight loss diet should help you have a healthy view of eating. It is NOT healthy to stop eating to try and lose weight. A good diet plan will help you cut down your food intake and make healthy choices.  A healthy weight loss goal is 1 to 2 pounds (0.5 to 1 kg) per week. Reducing calories in your diet, burning calories through exercise, or both can help you lose weight. Combining a healthy diet with regular physical activity can help you get the best results.  To cut calories in your diet you can:  Switch from whole milk to 1% or skim milk.  Switch from regular cheese to low-fat or fat-free cheese.  Use healthier condiment choices:  Fat-free or low-fat sour cream or salad dressings  Spray butter  Diet syrups or jellies over regular  Try frozen yogurt as a dessert rather than eating ice cream.  Skip the chips. Snack on carrots, vegetables, or fruit. If chips are a favorite of yours, try the baked style and watch portion size.  Eat grilled, roasted, boiled, broiled, or baked meats. Avoid deep-frying. Choose skinless poultry, lean red meat, lean cuts of pork, and fish for good protein sources.  Try flavored no-calorie ramos. Do not drink soda and juices that have many calories.  Choose fruit instead of sweets.  General   Eating smaller meals more often may be helpful. This will keep you from overeating at your next meal. Also, eating meals slowly helps you feel full faster.  If eating 3 meals is a part of your lifestyle, choose more lean proteins and higher fiber foods to fill you up at each meal.  Do not skip meals. Most often if you skip a meal, you eat too much at the next meal.  Eat smaller portions. Use a smaller plate or bowl for meals, and when you " are eating out, eat half and take the rest home.  Plan ahead. Plan your meals and grocery list before going to the store. Planning will keep you from getting meals from restaurants.  Do not go to the grocery store hungry. You are more likely to buy snacks that are not good for you.  Portion out snacks. When you are having a snack, instead of grabbing the whole bag, portion a small amount out to give yourself a stopping point.  Drink water before and after your meals to help fill you up without the calories.  When eating starchy foods, choose whole-grain products. These have a lot of fiber which will make you feel full. Fiber also helps lower cholesterol and helps with bowel function.  If you need a helpful start, ask your doctor to send you to a dietitian for weight loss help.         What will the results be?   Losing excess weight will make your whole body healthier. You will have more energy for your daily activities and lower your risk for health problems.  What lifestyle changes are needed?   Stay active. Eating healthy is not always enough to lose weight. Burning calories by exercising is a big part of weight loss.  What foods are good to eat?   The key is to watch your portion sizes. It is best to choose foods that are lower in fat and calories.  Choose lean meats:  Boneless, skinless chicken breast  Pork loin  90% lean beef  Lean turkey meat  Fresh fish (not fried)  Choose low-fat dairy products:  1% or skim milk  Spray butter or margarine  Low-fat or fat-free cheese  Frozen yogurt or low-calorie ice cream  Choose fresh fruits, vegetables, beans and lentils, and whole wheat products more often.  Choose water to drink more often. Drink diet or no-calorie beverages when you want something other than water. Aim to get your calories from the foods you eat.  Choose smart snacks:  Fruits  Vegetables  Low-fat or nonfat yogurt  Low-fat or no-fat cheese, such as cottage cheese  Unsalted nuts  Hard-boiled  egg  Hummus  Guacamole  Natural peanut butter  Popcorn with no butter ? use pepper, garlic, or another spice to taste  Whole grain crackers  What foods should be limited or avoided?   Limit high-fat, high-sodium, and high-calorie foods like:  Fried foods  Processed meats  Whole-fat dairy products  Candy, cookies, chips, pastries  Sausage, ng, any full-fat meats  Soda, juice  Beer, wine, and mixed drinks (alcohol)  Will there be any other care needed?   What do I do first before trying to lose weight?  Talk to your doctor and dietitian to see if you need to lose weight. Work with them to set your weight loss goals.  If you have a chronic illness, such as high blood sugar or high blood pressure, ask a doctor or dietitian what diet and exercise is right for you.  Ask your doctor about how much you are able to exercise and what type of exercise is good for you.  Helpful tips   Keep a food journal to help keep you on track.  Join a support group.  Tips for burning calories:  If your workplace is near your house, choose to walk or bike to work instead of driving.  Take 20-minute walks each day. Walk around during your lunch break. You will not only burn calories, but will raise your energy for the rest of the day.  Take the stairs over the elevator.  Join a gym or exercise class with a friend.  Try to exercise 30 minutes a day for overall health. Three 10-minute sessions work too. Aim for 60 to 90 minutes a day to lose weight.  Drink lots of water before, during, and after exercise.  Last Reviewed Date   2021-06-24  Consumer Information Use and Disclaimer   This generalized information is a limited summary of diagnosis, treatment, and/or medication information. It is not meant to be comprehensive and should be used as a tool to help the user understand and/or assess potential diagnostic and treatment options. It does NOT include all information about conditions, treatments, medications, side effects, or risks that may  apply to a specific patient. It is not intended to be medical advice or a substitute for the medical advice, diagnosis, or treatment of a health care provider based on the health care provider's examination and assessment of a patient’s specific and unique circumstances. Patients must speak with a health care provider for complete information about their health, medical questions, and treatment options, including any risks or benefits regarding use of medications. This information does not endorse any treatments or medications as safe, effective, or approved for treating a specific patient. UpToDate, Inc. and its affiliates disclaim any warranty or liability relating to this information or the use thereof. The use of this information is governed by the Terms of Use, available at https://www.[employer].com/en/know/clinical-effectiveness-terms   Copyright   Copyright © 2024 UpToDate, Inc. and its affiliates and/or licensors. All rights reserved.

## 2024-11-12 NOTE — ASSESSMENT & PLAN NOTE
Orders:    dicyclomine (BENTYL) 10 mg capsule; Take 1 capsule (10 mg total) by mouth 3 (three) times a day before meals

## 2024-11-12 NOTE — ASSESSMENT & PLAN NOTE
Stable from previous. Heart healthy diet advised. Exercise is limited by her knee problems.    Orders:    CBC; Future    Basic metabolic panel; Future    Lipid Panel with Direct LDL reflex; Future

## 2025-01-01 DIAGNOSIS — F33.42 RECURRENT MAJOR DEPRESSION IN FULL REMISSION (HCC): ICD-10-CM

## 2025-01-01 RX ORDER — ESCITALOPRAM OXALATE 20 MG/1
20 TABLET ORAL DAILY
Qty: 90 TABLET | Refills: 3 | Status: SHIPPED | OUTPATIENT
Start: 2025-01-01

## 2025-01-03 ENCOUNTER — TELEPHONE (OUTPATIENT)
Age: 80
End: 2025-01-03

## 2025-01-03 ENCOUNTER — APPOINTMENT (OUTPATIENT)
Age: 80
End: 2025-01-03
Payer: MEDICARE

## 2025-01-03 ENCOUNTER — OFFICE VISIT (OUTPATIENT)
Age: 80
End: 2025-01-03
Payer: MEDICARE

## 2025-01-03 ENCOUNTER — RESULTS FOLLOW-UP (OUTPATIENT)
Age: 80
End: 2025-01-03

## 2025-01-03 VITALS
WEIGHT: 231.2 LBS | RESPIRATION RATE: 16 BRPM | HEIGHT: 58 IN | BODY MASS INDEX: 48.53 KG/M2 | DIASTOLIC BLOOD PRESSURE: 72 MMHG | TEMPERATURE: 97.5 F | SYSTOLIC BLOOD PRESSURE: 122 MMHG | OXYGEN SATURATION: 94 % | HEART RATE: 67 BPM

## 2025-01-03 DIAGNOSIS — J18.9 PNEUMONIA OF RIGHT LOWER LOBE DUE TO INFECTIOUS ORGANISM: ICD-10-CM

## 2025-01-03 DIAGNOSIS — J40 BRONCHITIS: Primary | ICD-10-CM

## 2025-01-03 DIAGNOSIS — R06.2 WHEEZING: ICD-10-CM

## 2025-01-03 PROCEDURE — 71046 X-RAY EXAM CHEST 2 VIEWS: CPT

## 2025-01-03 PROCEDURE — 99214 OFFICE O/P EST MOD 30 MIN: CPT

## 2025-01-03 RX ORDER — DOXYCYCLINE HYCLATE 100 MG
100 TABLET ORAL 2 TIMES DAILY
Qty: 10 TABLET | Refills: 0 | Status: SHIPPED | OUTPATIENT
Start: 2025-01-03 | End: 2025-01-08

## 2025-01-03 RX ORDER — ALBUTEROL SULFATE 90 UG/1
INHALANT RESPIRATORY (INHALATION)
Qty: 18 G | Refills: 0 | Status: SHIPPED | OUTPATIENT
Start: 2025-01-03

## 2025-01-03 RX ORDER — BENZONATATE 200 MG/1
200 CAPSULE ORAL 3 TIMES DAILY PRN
Qty: 20 CAPSULE | Refills: 0 | Status: SHIPPED | OUTPATIENT
Start: 2025-01-03

## 2025-01-03 NOTE — PROGRESS NOTES
Name: Yi Foy      : 1945      MRN: [id number]  Encounter Provider: Jennifer Lee PA-C  Encounter Date: 1/3/2025   Encounter department: [facility], [place]  :  Assessment & Plan  Bronchitis  With decreased lung sounds and wheezing, large amount of mucus and cough will treat for bronchitis.  Concerning for pneumonia with decreased lung sounds in bases.  Will obtain CXR, doxycycline twice daily for 5 days.  Stop the magnesium and calcium supplement while on doxy.  Supportive measures discussed, monitor for signs of dehydration.  Benzonatate for cough, may also use guaifenesin DM OTC.  Will follow up with xray results.  Follow up if not improving in two weeks.  Cough can linger for several weeks after other symptoms resolve.  Orders:  •  XR chest pa and lateral; Future  •  doxycycline hyclate (VIBRA-TABS) 100 mg tablet; Take 1 tablet (100 mg total) by mouth 2 (two) times a day for 5 days  •  benzonatate (TESSALON) 200 MG capsule; Take 1 capsule (200 mg total) by mouth 3 (three) times a day as needed for cough    Wheezing  Mild wheezing and reports o chest tightness at times.  Given albuterol inhaler to use every 4-6 hours as needed for wheezing, SOB, chest tightness.  Given verbal and written instructions for inhaler use.  Orders:  •  albuterol (Ventolin HFA) 90 mcg/act inhaler; 1-2 puffs inhaled every 4 to 6 hours as needed for wheezing or shortness of breath           History of Present Illness     Cough  Associated symptoms include headaches, myalgias, postnasal drip, rhinorrhea and a sore throat. Pertinent negatives include no chest pain, chills, ear pain, fever or shortness of breath.       Pt is here for congestion that started 4 days ago.  Has associated sinus congestion, cough productive of phelgm, myalgias, sore throat, rhinorrhea, PND, and fatigue.    She is eating and drinking fine.  Denies fevers, chills, night sweats, SOB.    Review of Systems   Constitutional:   "Positive for appetite change and fatigue. Negative for chills, diaphoresis and fever.   HENT:  Positive for congestion, postnasal drip, rhinorrhea, sinus pressure, sore throat and voice change (hoarseness). Negative for drooling, ear pain, sinus pain and trouble swallowing.    Eyes: Negative.    Respiratory:  Positive for cough and chest tightness. Negative for shortness of breath.    Cardiovascular:  Negative for chest pain and palpitations.   Gastrointestinal:  Negative for abdominal distention, abdominal pain, constipation, diarrhea, nausea and vomiting.   Genitourinary: Negative.    Musculoskeletal:  Positive for myalgias. Negative for gait problem.   Neurological:  Positive for headaches. Negative for dizziness, weakness and light-headedness.       Objective   /72 (BP Location: Left arm, Patient Position: Sitting, Cuff Size: Large)   Pulse 67   Temp 97.5 °F (36.4 °C) (Tympanic)   Resp 16   Ht 4' 10\" (1.473 m)   Wt 105 kg (231 lb 3.2 oz)   SpO2 94%   BMI 48.32 kg/m²      Physical Exam  Vitals and nursing note reviewed.   Constitutional:       General: She is not in acute distress.     Appearance: Normal appearance.   HENT:      Head: Normocephalic and atraumatic.      Right Ear: Hearing, tympanic membrane, ear canal and external ear normal. No middle ear effusion. No mastoid tenderness. Tympanic membrane is not erythematous.      Left Ear: Hearing, tympanic membrane, ear canal and external ear normal.  No middle ear effusion. No mastoid tenderness. Tympanic membrane is not erythematous.      Nose: Congestion and rhinorrhea present. Rhinorrhea is clear.      Right Sinus: No maxillary sinus tenderness or frontal sinus tenderness.      Left Sinus: No maxillary sinus tenderness or frontal sinus tenderness.      Mouth/Throat:      Lips: Pink.      Mouth: Mucous membranes are dry. No oral lesions.      Dentition: Normal dentition.      Tongue: No lesions.      Pharynx: Uvula midline. Posterior " oropharyngeal erythema and postnasal drip present. No oropharyngeal exudate or uvula swelling.      Tonsils: No tonsillar exudate.   Eyes:      General: Lids are normal. Vision grossly intact. No scleral icterus.  Cardiovascular:      Rate and Rhythm: Normal rate and regular rhythm.      Heart sounds: Normal heart sounds.   Pulmonary:      Effort: Pulmonary effort is normal. No accessory muscle usage or respiratory distress.      Breath sounds: No stridor. Examination of the right-lower field reveals wheezing. Examination of the left-lower field reveals wheezing. Wheezing and rhonchi present. No rales.   Abdominal:      General: Bowel sounds are normal.      Palpations: Abdomen is soft.      Tenderness: There is no abdominal tenderness.   Musculoskeletal:         General: No tenderness, deformity or signs of injury.      Cervical back: Normal range of motion and neck supple.   Lymphadenopathy:      Cervical: No cervical adenopathy.   Skin:     General: Skin is warm and dry.   Neurological:      Mental Status: She is alert and oriented to person, place, and time.      Gait: Gait is intact.

## 2025-01-03 NOTE — TELEPHONE ENCOUNTER
called requesting an appointment for patient due to heavy chest congestion, cough and phlegm.  Pt was given an appointment with April for 10 am today. No appointments available with PCP.    Pt also has new insurance and  states they will bring the new cards along with them this morning.

## 2025-02-10 ENCOUNTER — APPOINTMENT (EMERGENCY)
Dept: CT IMAGING | Facility: HOSPITAL | Age: 80
DRG: 389 | End: 2025-02-10
Payer: MEDICARE

## 2025-02-10 ENCOUNTER — HOSPITAL ENCOUNTER (INPATIENT)
Facility: HOSPITAL | Age: 80
LOS: 3 days | Discharge: HOME | DRG: 389 | End: 2025-02-14
Admitting: STUDENT IN AN ORGANIZED HEALTH CARE EDUCATION/TRAINING PROGRAM
Payer: MEDICARE

## 2025-02-10 DIAGNOSIS — R03.0 ELEVATED BLOOD PRESSURE READING: ICD-10-CM

## 2025-02-10 DIAGNOSIS — K56.609 SBO (SMALL BOWEL OBSTRUCTION) (HCC): Primary | ICD-10-CM

## 2025-02-10 LAB
ALBUMIN SERPL BCG-MCNC: 4.4 G/DL (ref 3.5–5)
ALP SERPL-CCNC: 66 U/L (ref 34–104)
ALT SERPL W P-5'-P-CCNC: 15 U/L (ref 7–52)
ANION GAP SERPL CALCULATED.3IONS-SCNC: 7 MMOL/L (ref 4–13)
AST SERPL W P-5'-P-CCNC: 25 U/L (ref 13–39)
BASOPHILS # BLD AUTO: 0.04 THOUSANDS/ÂΜL (ref 0–0.1)
BASOPHILS NFR BLD AUTO: 0 % (ref 0–1)
BILIRUB SERPL-MCNC: 0.7 MG/DL (ref 0.2–1)
BUN SERPL-MCNC: 26 MG/DL (ref 5–25)
CALCIUM SERPL-MCNC: 9.8 MG/DL (ref 8.4–10.2)
CHLORIDE SERPL-SCNC: 101 MMOL/L (ref 96–108)
CO2 SERPL-SCNC: 30 MMOL/L (ref 21–32)
CREAT SERPL-MCNC: 0.76 MG/DL (ref 0.6–1.3)
EOSINOPHIL # BLD AUTO: 0.17 THOUSAND/ÂΜL (ref 0–0.61)
EOSINOPHIL NFR BLD AUTO: 1 % (ref 0–6)
ERYTHROCYTE [DISTWIDTH] IN BLOOD BY AUTOMATED COUNT: 13.7 % (ref 11.6–15.1)
GFR SERPL CREATININE-BSD FRML MDRD: 74 ML/MIN/1.73SQ M
GLUCOSE SERPL-MCNC: 125 MG/DL (ref 65–140)
HCT VFR BLD AUTO: 45 % (ref 34.8–46.1)
HGB BLD-MCNC: 15 G/DL (ref 11.5–15.4)
IMM GRANULOCYTES # BLD AUTO: 0.04 THOUSAND/UL (ref 0–0.2)
IMM GRANULOCYTES NFR BLD AUTO: 0 % (ref 0–2)
LACTATE SERPL-SCNC: 1.8 MMOL/L (ref 0.5–2)
LIPASE SERPL-CCNC: 35 U/L (ref 11–82)
LYMPHOCYTES # BLD AUTO: 0.95 THOUSANDS/ÂΜL (ref 0.6–4.47)
LYMPHOCYTES NFR BLD AUTO: 7 % (ref 14–44)
MCH RBC QN AUTO: 29.7 PG (ref 26.8–34.3)
MCHC RBC AUTO-ENTMCNC: 33.3 G/DL (ref 31.4–37.4)
MCV RBC AUTO: 89 FL (ref 82–98)
MONOCYTES # BLD AUTO: 0.87 THOUSAND/ÂΜL (ref 0.17–1.22)
MONOCYTES NFR BLD AUTO: 6 % (ref 4–12)
NEUTROPHILS # BLD AUTO: 11.66 THOUSANDS/ÂΜL (ref 1.85–7.62)
NEUTS SEG NFR BLD AUTO: 86 % (ref 43–75)
NRBC BLD AUTO-RTO: 0 /100 WBCS
PLATELET # BLD AUTO: 165 THOUSANDS/UL (ref 149–390)
PMV BLD AUTO: 10.5 FL (ref 8.9–12.7)
POTASSIUM SERPL-SCNC: 4 MMOL/L (ref 3.5–5.3)
PROT SERPL-MCNC: 7.6 G/DL (ref 6.4–8.4)
RBC # BLD AUTO: 5.05 MILLION/UL (ref 3.81–5.12)
SODIUM SERPL-SCNC: 138 MMOL/L (ref 135–147)
WBC # BLD AUTO: 13.73 THOUSAND/UL (ref 4.31–10.16)

## 2025-02-10 PROCEDURE — 83690 ASSAY OF LIPASE: CPT

## 2025-02-10 PROCEDURE — 96374 THER/PROPH/DIAG INJ IV PUSH: CPT

## 2025-02-10 PROCEDURE — 96375 TX/PRO/DX INJ NEW DRUG ADDON: CPT

## 2025-02-10 PROCEDURE — 85025 COMPLETE CBC W/AUTO DIFF WBC: CPT

## 2025-02-10 PROCEDURE — 36415 COLL VENOUS BLD VENIPUNCTURE: CPT

## 2025-02-10 PROCEDURE — 96361 HYDRATE IV INFUSION ADD-ON: CPT

## 2025-02-10 PROCEDURE — 80053 COMPREHEN METABOLIC PANEL: CPT

## 2025-02-10 PROCEDURE — 74177 CT ABD & PELVIS W/CONTRAST: CPT

## 2025-02-10 PROCEDURE — 99285 EMERGENCY DEPT VISIT HI MDM: CPT

## 2025-02-10 PROCEDURE — 83605 ASSAY OF LACTIC ACID: CPT

## 2025-02-10 PROCEDURE — 99284 EMERGENCY DEPT VISIT MOD MDM: CPT

## 2025-02-10 RX ORDER — ONDANSETRON 2 MG/ML
4 INJECTION INTRAMUSCULAR; INTRAVENOUS ONCE
Status: COMPLETED | OUTPATIENT
Start: 2025-02-10 | End: 2025-02-10

## 2025-02-10 RX ORDER — FENTANYL CITRATE 50 UG/ML
50 INJECTION, SOLUTION INTRAMUSCULAR; INTRAVENOUS ONCE
Refills: 0 | Status: COMPLETED | OUTPATIENT
Start: 2025-02-10 | End: 2025-02-10

## 2025-02-10 RX ADMIN — ONDANSETRON 4 MG: 2 INJECTION INTRAMUSCULAR; INTRAVENOUS at 21:23

## 2025-02-10 RX ADMIN — FENTANYL CITRATE 50 MCG: 50 INJECTION INTRAMUSCULAR; INTRAVENOUS at 21:23

## 2025-02-10 RX ADMIN — SODIUM CHLORIDE 500 ML: 0.9 INJECTION, SOLUTION INTRAVENOUS at 21:24

## 2025-02-10 RX ADMIN — IOHEXOL 100 ML: 350 INJECTION, SOLUTION INTRAVENOUS at 22:25

## 2025-02-11 ENCOUNTER — APPOINTMENT (OUTPATIENT)
Dept: RADIOLOGY | Facility: HOSPITAL | Age: 80
DRG: 389 | End: 2025-02-11
Payer: MEDICARE

## 2025-02-11 LAB
ANION GAP SERPL CALCULATED.3IONS-SCNC: 5 MMOL/L (ref 4–13)
BASOPHILS # BLD AUTO: 0.04 THOUSANDS/ÂΜL (ref 0–0.1)
BASOPHILS NFR BLD AUTO: 0 % (ref 0–1)
BUN SERPL-MCNC: 24 MG/DL (ref 5–25)
CALCIUM SERPL-MCNC: 8.2 MG/DL (ref 8.4–10.2)
CHLORIDE SERPL-SCNC: 108 MMOL/L (ref 96–108)
CO2 SERPL-SCNC: 28 MMOL/L (ref 21–32)
CREAT SERPL-MCNC: 0.63 MG/DL (ref 0.6–1.3)
EOSINOPHIL # BLD AUTO: 0.25 THOUSAND/ÂΜL (ref 0–0.61)
EOSINOPHIL NFR BLD AUTO: 3 % (ref 0–6)
ERYTHROCYTE [DISTWIDTH] IN BLOOD BY AUTOMATED COUNT: 13.9 % (ref 11.6–15.1)
GFR SERPL CREATININE-BSD FRML MDRD: 85 ML/MIN/1.73SQ M
GLUCOSE SERPL-MCNC: 100 MG/DL (ref 65–140)
HCT VFR BLD AUTO: 39.8 % (ref 34.8–46.1)
HGB BLD-MCNC: 12.9 G/DL (ref 11.5–15.4)
IMM GRANULOCYTES # BLD AUTO: 0.03 THOUSAND/UL (ref 0–0.2)
IMM GRANULOCYTES NFR BLD AUTO: 0 % (ref 0–2)
LYMPHOCYTES # BLD AUTO: 1.59 THOUSANDS/ÂΜL (ref 0.6–4.47)
LYMPHOCYTES NFR BLD AUTO: 18 % (ref 14–44)
MAGNESIUM SERPL-MCNC: 2 MG/DL (ref 1.9–2.7)
MCH RBC QN AUTO: 30 PG (ref 26.8–34.3)
MCHC RBC AUTO-ENTMCNC: 32.4 G/DL (ref 31.4–37.4)
MCV RBC AUTO: 93 FL (ref 82–98)
MONOCYTES # BLD AUTO: 0.67 THOUSAND/ÂΜL (ref 0.17–1.22)
MONOCYTES NFR BLD AUTO: 8 % (ref 4–12)
NEUTROPHILS # BLD AUTO: 6.33 THOUSANDS/ÂΜL (ref 1.85–7.62)
NEUTS SEG NFR BLD AUTO: 71 % (ref 43–75)
NRBC BLD AUTO-RTO: 0 /100 WBCS
PHOSPHATE SERPL-MCNC: 3.5 MG/DL (ref 2.3–4.1)
PLATELET # BLD AUTO: 134 THOUSANDS/UL (ref 149–390)
PMV BLD AUTO: 10.6 FL (ref 8.9–12.7)
POTASSIUM SERPL-SCNC: 3.8 MMOL/L (ref 3.5–5.3)
RBC # BLD AUTO: 4.3 MILLION/UL (ref 3.81–5.12)
SODIUM SERPL-SCNC: 141 MMOL/L (ref 135–147)
WBC # BLD AUTO: 8.91 THOUSAND/UL (ref 4.31–10.16)

## 2025-02-11 PROCEDURE — 99222 1ST HOSP IP/OBS MODERATE 55: CPT | Performed by: SURGERY

## 2025-02-11 PROCEDURE — 71045 X-RAY EXAM CHEST 1 VIEW: CPT

## 2025-02-11 PROCEDURE — 85025 COMPLETE CBC W/AUTO DIFF WBC: CPT | Performed by: STUDENT IN AN ORGANIZED HEALTH CARE EDUCATION/TRAINING PROGRAM

## 2025-02-11 PROCEDURE — 36415 COLL VENOUS BLD VENIPUNCTURE: CPT | Performed by: STUDENT IN AN ORGANIZED HEALTH CARE EDUCATION/TRAINING PROGRAM

## 2025-02-11 PROCEDURE — 83735 ASSAY OF MAGNESIUM: CPT | Performed by: STUDENT IN AN ORGANIZED HEALTH CARE EDUCATION/TRAINING PROGRAM

## 2025-02-11 PROCEDURE — 84100 ASSAY OF PHOSPHORUS: CPT | Performed by: STUDENT IN AN ORGANIZED HEALTH CARE EDUCATION/TRAINING PROGRAM

## 2025-02-11 PROCEDURE — 80048 BASIC METABOLIC PNL TOTAL CA: CPT | Performed by: STUDENT IN AN ORGANIZED HEALTH CARE EDUCATION/TRAINING PROGRAM

## 2025-02-11 RX ORDER — SODIUM CHLORIDE 9 MG/ML
130 INJECTION, SOLUTION INTRAVENOUS CONTINUOUS
Status: DISCONTINUED | OUTPATIENT
Start: 2025-02-11 | End: 2025-02-11

## 2025-02-11 RX ORDER — LORAZEPAM 0.5 MG/1
0.5 TABLET ORAL ONCE
Status: DISCONTINUED | OUTPATIENT
Start: 2025-02-11 | End: 2025-02-11

## 2025-02-11 RX ORDER — LIDOCAINE HYDROCHLORIDE 20 MG/ML
1 JELLY TOPICAL ONCE
Status: COMPLETED | OUTPATIENT
Start: 2025-02-11 | End: 2025-02-11

## 2025-02-11 RX ORDER — MORPHINE SULFATE 4 MG/ML
4 INJECTION, SOLUTION INTRAMUSCULAR; INTRAVENOUS EVERY 4 HOURS PRN
Status: DISCONTINUED | OUTPATIENT
Start: 2025-02-11 | End: 2025-02-12

## 2025-02-11 RX ORDER — SODIUM CHLORIDE, SODIUM LACTATE, POTASSIUM CHLORIDE, CALCIUM CHLORIDE 600; 310; 30; 20 MG/100ML; MG/100ML; MG/100ML; MG/100ML
125 INJECTION, SOLUTION INTRAVENOUS CONTINUOUS
Status: DISCONTINUED | OUTPATIENT
Start: 2025-02-11 | End: 2025-02-14

## 2025-02-11 RX ORDER — LORAZEPAM 2 MG/ML
0.5 INJECTION INTRAMUSCULAR ONCE
Status: DISCONTINUED | OUTPATIENT
Start: 2025-02-11 | End: 2025-02-14 | Stop reason: HOSPADM

## 2025-02-11 RX ORDER — ACETAMINOPHEN 10 MG/ML
1000 INJECTION, SOLUTION INTRAVENOUS EVERY 6 HOURS PRN
Status: DISCONTINUED | OUTPATIENT
Start: 2025-02-11 | End: 2025-02-14 | Stop reason: HOSPADM

## 2025-02-11 RX ORDER — ONDANSETRON 2 MG/ML
4 INJECTION INTRAMUSCULAR; INTRAVENOUS EVERY 6 HOURS PRN
Status: DISCONTINUED | OUTPATIENT
Start: 2025-02-11 | End: 2025-02-14 | Stop reason: HOSPADM

## 2025-02-11 RX ORDER — HEPARIN SODIUM 5000 [USP'U]/ML
7500 INJECTION, SOLUTION INTRAVENOUS; SUBCUTANEOUS EVERY 8 HOURS SCHEDULED
Status: DISCONTINUED | OUTPATIENT
Start: 2025-02-11 | End: 2025-02-14 | Stop reason: HOSPADM

## 2025-02-11 RX ADMIN — ONDANSETRON 4 MG: 2 INJECTION INTRAMUSCULAR; INTRAVENOUS at 00:49

## 2025-02-11 RX ADMIN — SODIUM CHLORIDE, SODIUM LACTATE, POTASSIUM CHLORIDE, AND CALCIUM CHLORIDE 125 ML/HR: .6; .31; .03; .02 INJECTION, SOLUTION INTRAVENOUS at 16:30

## 2025-02-11 RX ADMIN — ONDANSETRON 4 MG: 2 INJECTION INTRAMUSCULAR; INTRAVENOUS at 07:10

## 2025-02-11 RX ADMIN — ONDANSETRON 4 MG: 2 INJECTION INTRAMUSCULAR; INTRAVENOUS at 13:33

## 2025-02-11 RX ADMIN — HEPARIN SODIUM 7500 UNITS: 5000 INJECTION INTRAVENOUS; SUBCUTANEOUS at 02:00

## 2025-02-11 RX ADMIN — HEPARIN SODIUM 7500 UNITS: 5000 INJECTION INTRAVENOUS; SUBCUTANEOUS at 15:04

## 2025-02-11 RX ADMIN — LIDOCAINE HYDROCHLORIDE 1 APPLICATION: 20 JELLY TOPICAL at 01:02

## 2025-02-11 RX ADMIN — MORPHINE SULFATE 2 MG: 2 INJECTION, SOLUTION INTRAMUSCULAR; INTRAVENOUS at 01:03

## 2025-02-11 RX ADMIN — HEPARIN SODIUM 7500 UNITS: 5000 INJECTION INTRAVENOUS; SUBCUTANEOUS at 23:22

## 2025-02-11 RX ADMIN — TOPICAL ANESTHETIC 2 SPRAY: 200 SPRAY DENTAL; PERIODONTAL at 01:02

## 2025-02-11 RX ADMIN — MORPHINE SULFATE 4 MG: 4 INJECTION INTRAVENOUS at 07:10

## 2025-02-11 RX ADMIN — ACETAMINOPHEN 1000 MG: 10 INJECTION INTRAVENOUS at 17:12

## 2025-02-11 RX ADMIN — SODIUM CHLORIDE 130 ML/HR: 0.9 INJECTION, SOLUTION INTRAVENOUS at 00:49

## 2025-02-11 NOTE — ASSESSMENT & PLAN NOTE
70y F with Abdominal pain, nausea and vomiting  H/o recurrent SBO often managed at home  NG tube placed in ED  Afebrile, On 2LNC, VSS   WBC 8.91 from 13.73  Abdomen soft with minimal LLQ tenderness non peritoneal  No flatus    CTAP: Findings compatible with small bowel obstruction likely secondary to adhesions. Recommend surgical consultation.     Plan   Continue NG tube to LIWS for decompression and monitor for return of bowel function  Serial abdominal exams and will check imaging if not resolving  PRN pain medication and anti-emetics  Monitor labs and vitals  Encourage ambulation  DVT ppx: heparin  Incentive spirometry 10 times/hour while awake

## 2025-02-11 NOTE — H&P
H&P - Surgery-General   Name: Yi Foy 79 y.o. female I MRN: [id number]  Unit/Bed#: ED 27 I Date of Admission: 2/10/2025   Date of Service: 2/11/2025 I Hospital Day: 0     Assessment & Plan  Small bowel obstruction (HCC)  70y F with Abdominal pain, nausea and vomiting  H/o recurrent SBO often managed at home  NG tube placed in ED  Afebrile, On 2LNC, VSS   WBC 8.91 from 13.73  Abdomen soft with minimal LLQ tenderness non peritoneal  No flatus    CTAP: Findings compatible with small bowel obstruction likely secondary to adhesions. Recommend surgical consultation.     Plan   Continue NG tube to LIWS for decompression and monitor for return of bowel function  Serial abdominal exams and will check imaging if not resolving  PRN pain medication and anti-emetics  Monitor labs and vitals  Encourage ambulation  DVT ppx: heparin  Incentive spirometry 10 times/hour while awake    History of Present Illness   Yi Foy is a 79 y.o. female who presents with Abdominal pain, nausea, and vomiting.  Patient reports having some abdominal pain starting on Sunday evening that progressed to became more severe yesterday with associated nausea vomiting.  This prompted her to come to the emergency department for evaluation.  Patient states she does occasionally get the symptoms at home and will manage them by only taking clears or decrease oral intake and symptoms are resolved.  Patient states she has not had nausea or vomiting at home in the past.  She does have history of several abdominal surgeries including a colectomy and 2 ventral hernia repairs.    Patient had NG tube placed in the emergency department.  Today she is feeling much better.  She denies any abdominal pain or nausea at the time however she did just have medication.  She has no further abdominal distention.  She denies any flatus.  Her last bowel movement was yesterday.    Review of Systems   Constitutional:  Negative for activity change, chills and fever.    HENT:  Negative for congestion, ear pain and sore throat.    Eyes:  Negative for pain and visual disturbance.   Respiratory:  Negative for cough and shortness of breath.    Cardiovascular:  Negative for chest pain and palpitations.   Gastrointestinal:  Negative for abdominal pain, constipation, diarrhea, nausea and vomiting.   Genitourinary:  Negative for difficulty urinating, dysuria and hematuria.   Musculoskeletal:  Negative for arthralgias and back pain.   Skin:  Negative for color change and rash.   Neurological:  Negative for seizures and syncope.   All other systems reviewed and are negative.    Historical Information   Past Medical History:   Diagnosis Date    Anxiety     Arthritis     Benign neoplasm of skin     Breast cancer (HCC)     Bursitis     Right hip    Cancer ([facility]) 2019    Depression     Diverticulitis     Frequency of urination     HL (hearing loss)     Hypercholesterolemia     IBS (irritable bowel syndrome)     Lyme disease     Morbid obesity ([facility])     Partial small bowel obstruction ([facility])     Prediabetes     Sleep apnea 2018    Small bowel obstruction ([facility]) 2021    Urinary tract infection 2022    Varicella     Vitamin D deficiency      Past Surgical History:   Procedure Laterality Date    APPENDECTOMY  1950    BREAST LUMPECTOMY Right     In spring of 2020 as per pt      SECTION      COLECTOMY      COLONOSCOPY      COMPLETE. ,     HERNIA REPAIR      SMALL INTESTINE SURGERY  2012     Social History     Tobacco Use    Smoking status: Never     Passive exposure: Never    Smokeless tobacco: Never   Vaping Use    Vaping status: Never Used   Substance and Sexual Activity    Alcohol use: No    Drug use: No    Sexual activity: Yes     Partners: Male     Birth control/protection: Post-menopausal     E-Cigarette/Vaping    E-Cigarette Use Never User      E-Cigarette/Vaping Substances    Nicotine No     THC No     CBD No     Flavoring No     Other No     Unknown No       Family history non-contributory  Prior to Admission Medications   Prescriptions Last Dose Informant Patient Reported? Taking?   Docusate Sodium 100 MG capsule 2/10/2025 Evening Self Yes Yes   Sig: Take 100 mg by mouth 2 (two) times a day 1 time per day   Probiotic Product (PROBIOTIC PO) Past Week Self Yes Yes   Sig: Take by mouth   VITAMIN B COMPLEX-C PO Past Week Self Yes Yes   Sig: Take by mouth daily    albuterol (Ventolin HFA) 90 mcg/act inhaler Not Taking  No No   Si-2 puffs inhaled every 4 to 6 hours as needed for wheezing or shortness of breath   Patient not taking: Reported on 2025   ascorbic acid (VITAMIN C) 500 mg tablet Past Week Self Yes Yes   Sig: Take 1 tablet by mouth every morning   aspirin 81 MG tablet Past Week Self Yes Yes   Sig: Take 81 mg by mouth daily   benzonatate (TESSALON) 200 MG capsule Not Taking  No No   Sig: Take 1 capsule (200 mg total) by mouth 3 (three) times a day as needed for cough   Patient not taking: Reported on 2025   cholecalciferol (VITAMIN D3) 1,000 units tablet Past Week Self Yes Yes   Sig: Take 1 tablet by mouth 2 (two) times a day   dicyclomine (BENTYL) 10 mg capsule 2/10/2025 Evening  No Yes   Sig: Take 1 capsule (10 mg total) by mouth 3 (three) times a day before meals   escitalopram (LEXAPRO) 20 mg tablet 2/10/2025  No Yes   Sig: TAKE 1 TABLET BY MOUTH EVERY DAY   estradiol (ESTRACE) 0.1 mg/g vaginal cream Past Week Self No Yes   Sig: And apply a pea sized amount to external vaginal opening and urethra area two to three times weekly Do not start before May 23, 2024.   magnesium 30 MG tablet Not Taking Self Yes No   Sig: Take 30 mg by mouth 2 (two) times a day   Patient not taking: Reported on 2025   nystatin (MYCOSTATIN) powder Past Week Self No Yes   Sig: Apply 1 Application topically 2 (two) times a day as needed (rash) to affected area   nystatin-triamcinolone (MYCOLOG-II) ointment Past Week Self No Yes   Sig: Apply topically 2 (two) times a  day 2 times daily or as needed for itching   zinc gluconate 50 mg tablet Past Week Self Yes Yes   Sig: Take 50 mg by mouth in the morning. As needed.      Facility-Administered Medications: None     Patient has no known allergies.    Objective :  Temp:  [98 °F (36.7 °C)] 98 °F (36.7 °C)  HR:  [60-92] 60  BP: (112-181)/(62-80) 115/64  Resp:  [16-20] 18  SpO2:  [89 %-97 %] 97 %  O2 Device: Nasal cannula  Nasal Cannula O2 Flow Rate (L/min):  [2 L/min] 2 L/min    Lines/Drains/Airways       Active Status       Name Placement date Placement time Site Days    NG/OG/Enteral Tube Nasogastric 16 Fr Left nare 02/11/25  0122  Left nare  less than 1                  Physical Exam  Vitals and nursing note reviewed.   Constitutional:       General: She is not in acute distress.     Appearance: She is well-developed.   HENT:      Head: Normocephalic and atraumatic.   Eyes:      Conjunctiva/sclera: Conjunctivae normal.   Cardiovascular:      Rate and Rhythm: Normal rate and regular rhythm.      Heart sounds: No murmur heard.  Pulmonary:      Effort: Pulmonary effort is normal. No respiratory distress.      Breath sounds: Normal breath sounds.   Abdominal:      Palpations: Abdomen is soft.      Tenderness: There is no abdominal tenderness.   Musculoskeletal:         General: No swelling.      Cervical back: Neck supple.   Skin:     General: Skin is warm and dry.      Capillary Refill: Capillary refill takes less than 2 seconds.   Neurological:      Mental Status: She is alert.   Psychiatric:         Mood and Affect: Mood normal.         Lab Results: I have reviewed the following results:  Recent Labs     02/10/25  2122 02/11/25  0518   WBC 13.73* 8.91   HGB 15.0 12.9   HCT 45.0 39.8    134*   SODIUM 138 141   K 4.0 3.8    108   CO2 30 28   BUN 26* 24   CREATININE 0.76 0.63   GLUC 125 100   MG  --  2.0   PHOS  --  3.5   AST 25  --    ALT 15  --    ALB 4.4  --    TBILI 0.70  --    ALKPHOS 66  --    LACTICACID 1.8  --         Imaging Results Review: I reviewed radiology reports from this admission including: CT abdomen/pelvis.  Other Study Results Review: No additional pertinent studies reviewed.    VTE Pharmacologic Prophylaxis: VTE covered by:  heparin (porcine), Subcutaneous, 7,500 Units at 02/11/25 0200   VTE Mechanical Prophylaxis: sequential compression device    Manju Khan  2/11/2025

## 2025-02-11 NOTE — ED NOTES
Patient transported to CT     Xochilt Licea  02/10/25 5723    
Patients spo2 continues to dip to 86%.  2l NC applied and provider made aware.     Elaine Killian RN  02/11/25 0155    
No

## 2025-02-12 ENCOUNTER — APPOINTMENT (INPATIENT)
Dept: RADIOLOGY | Facility: HOSPITAL | Age: 80
DRG: 389 | End: 2025-02-12
Payer: MEDICARE

## 2025-02-12 PROCEDURE — 99232 SBSQ HOSP IP/OBS MODERATE 35: CPT | Performed by: STUDENT IN AN ORGANIZED HEALTH CARE EDUCATION/TRAINING PROGRAM

## 2025-02-12 PROCEDURE — 74022 RADEX COMPL AQT ABD SERIES: CPT

## 2025-02-12 RX ORDER — OXYCODONE HYDROCHLORIDE 5 MG/1
5 TABLET ORAL EVERY 4 HOURS PRN
Refills: 0 | Status: DISCONTINUED | OUTPATIENT
Start: 2025-02-12 | End: 2025-02-14 | Stop reason: HOSPADM

## 2025-02-12 RX ORDER — OXYCODONE HYDROCHLORIDE 10 MG/1
10 TABLET ORAL EVERY 4 HOURS PRN
Refills: 0 | Status: DISCONTINUED | OUTPATIENT
Start: 2025-02-12 | End: 2025-02-14 | Stop reason: HOSPADM

## 2025-02-12 RX ORDER — XYLITOL/YERBA SANTA
5 AEROSOL, SPRAY WITH PUMP (ML) MUCOUS MEMBRANE 4 TIMES DAILY PRN
Status: DISCONTINUED | OUTPATIENT
Start: 2025-02-12 | End: 2025-02-14 | Stop reason: HOSPADM

## 2025-02-12 RX ADMIN — ONDANSETRON 4 MG: 2 INJECTION INTRAMUSCULAR; INTRAVENOUS at 16:17

## 2025-02-12 RX ADMIN — MORPHINE SULFATE 2 MG: 2 INJECTION, SOLUTION INTRAMUSCULAR; INTRAVENOUS at 09:54

## 2025-02-12 RX ADMIN — SODIUM CHLORIDE, SODIUM LACTATE, POTASSIUM CHLORIDE, AND CALCIUM CHLORIDE 125 ML/HR: .6; .31; .03; .02 INJECTION, SOLUTION INTRAVENOUS at 01:42

## 2025-02-12 RX ADMIN — HEPARIN SODIUM 7500 UNITS: 5000 INJECTION INTRAVENOUS; SUBCUTANEOUS at 22:39

## 2025-02-12 RX ADMIN — MORPHINE SULFATE 2 MG: 2 INJECTION, SOLUTION INTRAMUSCULAR; INTRAVENOUS at 20:20

## 2025-02-12 RX ADMIN — HEPARIN SODIUM 7500 UNITS: 5000 INJECTION INTRAVENOUS; SUBCUTANEOUS at 09:55

## 2025-02-12 NOTE — PROGRESS NOTES
Progress Note - Surgery-General   Name: Yi Foy 79 y.o. female I MRN: [id number]  Unit/Bed#: -01 I Date of Admission: 2/10/2025   Date of Service: 2/12/2025 I Hospital Day: 1    Assessment & Plan  Small bowel obstruction (HCC)  70y F with Abdominal pain, nausea and vomiting  H/o recurrent SBO often managed at home  CTAP: Findings compatible with small bowel obstruction likely secondary to adhesions. Recommend surgical consultation.         Pt is passing gas, she has much less abdominal pain, no nausea or vomiting.  , 1 x unmeasured today    last 24 h   Obstruction Series, NGT in position, no dilated loops of bowel     -clamp NGT x 4 hours and check residual  -if residual is low then d/c NGT and start on CLD  -add chloraseptic and mouth kote,  -ambulation  -will add p.o. pain meds, ok to give via clamped NGT  -continue IVF for adequate urine output  -cont to monitor bowel function,             Subjective   I am passing gas. I have much less pain, I am belching as well. Pt has O2 NC, may be contributing to belching.  I will be walking today\  No nausea,     Objective :  Temp:  [98.4 °F (36.9 °C)-99.2 °F (37.3 °C)] 98.4 °F (36.9 °C)  HR:  [62-76] 76  BP: (119-161)/(47-72) 161/72  Resp:  [17-18] 17  SpO2:  [93 %-98 %] 93 %  O2 Device: Nasal cannula  Nasal Cannula O2 Flow Rate (L/min):  [2 L/min] 2 L/min    I/O         02/10 0701 02/11 0700 02/11 0701  02/12 0700 02/12 0701 02/13 0700    I.V. (mL/kg)  2150 (18.7)     Total Intake(mL/kg)  2150 (18.7)     Urine (mL/kg/hr)  200 (0.1)     Emesis/NG output  150     Total Output  350     Net  +1800            Unmeasured Urine Occurrence   1 x          Lines/Drains/Airways       Active Status       Name Placement date Placement time Site Days    NG/OG/Enteral Tube Nasogastric 16 Fr Left nare 02/11/25  0122  Left nare  1                  Physical Exam  Constitutional:       Appearance: She is obese.   Cardiovascular:      Rate and Rhythm: Normal  rate and regular rhythm.   Pulmonary:      Effort: Pulmonary effort is normal.      Breath sounds: Normal breath sounds.   Abdominal:      General: Bowel sounds are normal. There is no distension.      Palpations: Abdomen is soft.      Tenderness: There is no abdominal tenderness.   Musculoskeletal:         General: Normal range of motion.   Neurological:      General: No focal deficit present.      Mental Status: She is alert and oriented to person, place, and time.   Psychiatric:         Mood and Affect: Mood normal.         Behavior: Behavior normal.           Lab Results: I have reviewed the following results:  Recent Labs     02/10/25  2122 02/11/25  0518   WBC 13.73* 8.91   HGB 15.0 12.9   HCT 45.0 39.8    134*   SODIUM 138 141   K 4.0 3.8    108   CO2 30 28   BUN 26* 24   CREATININE 0.76 0.63   GLUC 125 100   MG  --  2.0   PHOS  --  3.5   AST 25  --    ALT 15  --    ALB 4.4  --    TBILI 0.70  --    ALKPHOS 66  --    LACTICACID 1.8  --        Isamar SIMMONS

## 2025-02-12 NOTE — CASE MANAGEMENT
Case Management Assessment & Discharge Planning Note    Patient name Yi Foy  Location /-01 MRN [id number]  : 1945 Date 2025       Current Admission Date: 2/10/2025  Current Admission Diagnosis:Small bowel obstruction (HCC)  Patient Active Problem List    Diagnosis Date Noted Date Diagnosed    Recurrent major depression in full remission (HCC) 2023     History of breast cancer 2022     Small bowel obstruction (HCC) 2021     Thrombocytopenia (HCC) 2021     Other irritable bowel syndrome 2020     CASEY (obstructive sleep apnea)      Vitamin D deficiency 07/10/2018     Borderline hyperlipidemia 05/10/2017     Osteopenia of multiple sites 2016     Morbid obesity with BMI of 45.0-49.9, adult ([facility]) 2016     Prediabetes 2014       LOS (days): 1  Geometric Mean LOS (GMLOS) (days): 3  Days to GMLOS:2     OBJECTIVE:    Risk of Unplanned Readmission Score: 9.32         Current admission status: Inpatient       Preferred Pharmacy:   [id number]  [place] - [address] [address]  Phone: [phone] Fax: [phone]    Primary Care Provider: Garry Gama DO    Primary Insurance: MEDICARE  Secondary Insurance:     ASSESSMENT:  Active Health Care Proxies    There are no active Health Care Proxies on file.       Advance Directives  Does patient have a Health Care POA?: No  Was patient offered paperwork?: Yes  Does patient currently have a Health Care decision maker?: No  Does patient have Advance Directives?: No  Was patient offered paperwork?: Yes  Primary Contact: alley Higgins         Readmission Root Cause  30 Day Readmission: No    Patient Information  Admitted from:: Home  Mental Status: Alert  During Assessment patient was accompanied by: Not accompanied during assessment  Assessment information provided by:: Patient  Primary Caregiver: Self  Support Systems: Self, Spouse/significant  other  County of Residence: [place]  What [facility] do you live in?: [place]  Home entry access options. Select all that apply.: Ramp  Type of Current Residence: 2 story home  Upon entering residence, is there a bedroom on the main floor (no further steps)?: Yes  Upon entering residence, is there a bathroom on the main floor (no further steps)?: Yes  Living Arrangements: Lives w/ Spouse/significant other  Is patient a ?: No    Activities of Daily Living Prior to Admission  Functional Status: Independent  Completes ADLs independently?: Yes  Ambulates independently?: Yes  Does patient use assisted devices?: No  Does patient currently own DME?: No  Does patient have a history of Outpatient Therapy (PT/OT)?: No  Does the patient have a history of Short-Term Rehab?: No  Does patient have a history of HHC?: No  Does patient currently have HHC?: No         Patient Information Continued  Income Source: Employed  Does patient have prescription coverage?: Yes  Does patient receive dialysis treatments?: No  Does patient have a history of substance abuse?: No  Does patient have a history of Mental Health Diagnosis?: Yes (MDD)  Is patient receiving treatment for mental health?: Yes  Has patient received inpatient treatment related to mental health in the last 2 years?: No         Means of Transportation  Means of Transport to Appts:: Drives Self      Social Determinants of Health (SDOH)      Flowsheet Row Most Recent Value   Housing Stability    In the last 12 months, was there a time when you were not able to pay the mortgage or rent on time? N   In the past 12 months, how many times have you moved where you were living? 0   At any time in the past 12 months, were you homeless or living in a shelter (including now)? N   Transportation Needs    In the past 12 months, has lack of transportation kept you from medical appointments or from getting medications? no   In the past 12 months, has lack of transportation kept you from  meetings, work, or from getting things needed for daily living? No   Food Insecurity    Within the past 12 months, you worried that your food would run out before you got the money to buy more. Never true   Within the past 12 months, the food you bought just didn't last and you didn't have money to get more. Never true   Utilities    In the past 12 months has the electric, gas, oil, or water company threatened to shut off services in your home? No            DISCHARGE DETAILS:    Discharge planning discussed with:: Patient at the bedside  Freedom of Choice: Yes  Comments - Freedom of Choice: FOC maintained in discussion regarding discharge planning. Patient is alert oriented and competent to make decisions. Introduced self and role, explained role of CM in arranging services such as DME, STR, HHC, and providing community resource information. Discussed current living situation and needs at discharge. Patient is IPTA. CM offered HHC, STR, DME, PCP, OP CM, community resources. Patient declined CM needs. Does not use DME. Pt is aware and encouraged to seek CM for any questions or concerns. CM continues to follow.  CM contacted family/caregiver?: No- see comments  Were Treatment Team discharge recommendations reviewed with patient/caregiver?: Yes  Did patient/caregiver verbalize understanding of patient care needs?: Yes  Were patient/caregiver advised of the risks associated with not following Treatment Team discharge recommendations?: Yes    Contacts  Patient Contacts: [place] spouse  Relationship to Patient:: Family  Reason/Outcome: Emergency Contact    Requested Home Health Care         Is the patient interested in HHC at discharge?: No    DME Referral Provided  Referral made for DME?: No    Other Referral/Resources/Interventions Provided:  Interventions: Declines resources  Referral Comments: CM will continue to follow for needs.    Would you like to participate in our [facility] service program?  : No -  Declined    Treatment Team Recommendation: Home  Discharge Destination Plan:: Home  Transport at Discharge : Family

## 2025-02-12 NOTE — ASSESSMENT & PLAN NOTE
70y F with Abdominal pain, nausea and vomiting  H/o recurrent SBO often managed at home  CTAP: Findings compatible with small bowel obstruction likely secondary to adhesions. Recommend surgical consultation.         Pt is passing gas, she has much less abdominal pain, no nausea or vomiting.  , 1 x unmeasured today    last 24 h   Obstruction Series, NGT in position, no dilated loops of bowel     -clamp NGT x 4 hours and check residual  -if residual is low then d/c NGT and start on CLD  -add chloraseptic and mouth kote,  -ambulation  -will add p.o. pain meds, ok to give via clamped NGT  -continue IVF for adequate urine output  -cont to monitor bowel function,

## 2025-02-13 LAB
ANION GAP SERPL CALCULATED.3IONS-SCNC: 4 MMOL/L (ref 4–13)
BASOPHILS # BLD AUTO: 0.03 THOUSANDS/ÂΜL (ref 0–0.1)
BASOPHILS NFR BLD AUTO: 0 % (ref 0–1)
BUN SERPL-MCNC: 22 MG/DL (ref 5–25)
CALCIUM SERPL-MCNC: 8.7 MG/DL (ref 8.4–10.2)
CHLORIDE SERPL-SCNC: 107 MMOL/L (ref 96–108)
CO2 SERPL-SCNC: 31 MMOL/L (ref 21–32)
CREAT SERPL-MCNC: 0.49 MG/DL (ref 0.6–1.3)
EOSINOPHIL # BLD AUTO: 0.16 THOUSAND/ÂΜL (ref 0–0.61)
EOSINOPHIL NFR BLD AUTO: 2 % (ref 0–6)
ERYTHROCYTE [DISTWIDTH] IN BLOOD BY AUTOMATED COUNT: 13.2 % (ref 11.6–15.1)
GFR SERPL CREATININE-BSD FRML MDRD: 92 ML/MIN/1.73SQ M
GLUCOSE SERPL-MCNC: 74 MG/DL (ref 65–140)
HCT VFR BLD AUTO: 36.4 % (ref 34.8–46.1)
HGB BLD-MCNC: 12 G/DL (ref 11.5–15.4)
IMM GRANULOCYTES # BLD AUTO: 0.03 THOUSAND/UL (ref 0–0.2)
IMM GRANULOCYTES NFR BLD AUTO: 0 % (ref 0–2)
LYMPHOCYTES # BLD AUTO: 1.7 THOUSANDS/ÂΜL (ref 0.6–4.47)
LYMPHOCYTES NFR BLD AUTO: 24 % (ref 14–44)
MCH RBC QN AUTO: 30 PG (ref 26.8–34.3)
MCHC RBC AUTO-ENTMCNC: 33 G/DL (ref 31.4–37.4)
MCV RBC AUTO: 91 FL (ref 82–98)
MONOCYTES # BLD AUTO: 0.55 THOUSAND/ÂΜL (ref 0.17–1.22)
MONOCYTES NFR BLD AUTO: 8 % (ref 4–12)
NEUTROPHILS # BLD AUTO: 4.61 THOUSANDS/ÂΜL (ref 1.85–7.62)
NEUTS SEG NFR BLD AUTO: 66 % (ref 43–75)
NRBC BLD AUTO-RTO: 0 /100 WBCS
PLATELET # BLD AUTO: 116 THOUSANDS/UL (ref 149–390)
PMV BLD AUTO: 10.6 FL (ref 8.9–12.7)
POTASSIUM SERPL-SCNC: 3.8 MMOL/L (ref 3.5–5.3)
RBC # BLD AUTO: 4 MILLION/UL (ref 3.81–5.12)
SODIUM SERPL-SCNC: 142 MMOL/L (ref 135–147)
WBC # BLD AUTO: 7.08 THOUSAND/UL (ref 4.31–10.16)

## 2025-02-13 PROCEDURE — 99232 SBSQ HOSP IP/OBS MODERATE 35: CPT | Performed by: SURGERY

## 2025-02-13 PROCEDURE — 80048 BASIC METABOLIC PNL TOTAL CA: CPT | Performed by: PHYSICIAN ASSISTANT

## 2025-02-13 PROCEDURE — 85025 COMPLETE CBC W/AUTO DIFF WBC: CPT | Performed by: PHYSICIAN ASSISTANT

## 2025-02-13 RX ORDER — PANTOPRAZOLE SODIUM 40 MG/10ML
40 INJECTION, POWDER, LYOPHILIZED, FOR SOLUTION INTRAVENOUS
Status: DISCONTINUED | OUTPATIENT
Start: 2025-02-13 | End: 2025-02-14 | Stop reason: HOSPADM

## 2025-02-13 RX ADMIN — HEPARIN SODIUM 7500 UNITS: 5000 INJECTION INTRAVENOUS; SUBCUTANEOUS at 06:02

## 2025-02-13 RX ADMIN — HEPARIN SODIUM 7500 UNITS: 5000 INJECTION INTRAVENOUS; SUBCUTANEOUS at 13:40

## 2025-02-13 RX ADMIN — PANTOPRAZOLE SODIUM 40 MG: 40 INJECTION, POWDER, FOR SOLUTION INTRAVENOUS at 11:43

## 2025-02-13 RX ADMIN — SODIUM CHLORIDE, SODIUM LACTATE, POTASSIUM CHLORIDE, AND CALCIUM CHLORIDE 125 ML/HR: .6; .31; .03; .02 INJECTION, SOLUTION INTRAVENOUS at 16:36

## 2025-02-13 RX ADMIN — SODIUM CHLORIDE, SODIUM LACTATE, POTASSIUM CHLORIDE, AND CALCIUM CHLORIDE 125 ML/HR: .6; .31; .03; .02 INJECTION, SOLUTION INTRAVENOUS at 01:18

## 2025-02-13 RX ADMIN — HEPARIN SODIUM 7500 UNITS: 5000 INJECTION INTRAVENOUS; SUBCUTANEOUS at 21:56

## 2025-02-13 RX ADMIN — SODIUM CHLORIDE, SODIUM LACTATE, POTASSIUM CHLORIDE, AND CALCIUM CHLORIDE 125 ML/HR: .6; .31; .03; .02 INJECTION, SOLUTION INTRAVENOUS at 09:57

## 2025-02-13 NOTE — PLAN OF CARE
Problem: Nutrition/Hydration-ADULT  Goal: Nutrient/Hydration intake appropriate for improving, restoring or maintaining nutritional needs  Description: Monitor and assess patient's nutrition/hydration status for malnutrition. Collaborate with interdisciplinary team and initiate plan and interventions as ordered.  Monitor patient's weight and dietary intake as ordered or per policy. Utilize nutrition screening tool and intervene as necessary. Determine patient's food preferences and provide high-protein, high-caloric foods as appropriate.     INTERVENTIONS:  - Monitor oral intake, urinary output, labs, and treatment plans  - Assess nutrition and hydration status and recommend course of action  - Evaluate amount of meals eaten  - Assist patient with eating if necessary   - Allow adequate time for meals  - Recommend/ encourage appropriate diets, oral nutritional supplements, and vitamin/mineral supplements  - Order, calculate, and assess calorie counts as needed  - Recommend, monitor, and adjust tube feedings and TPN/PPN based on assessed needs  - Assess need for intravenous fluids  - Provide specific nutrition/hydration education as appropriate  - Include patient/family/caregiver in decisions related to nutrition  Outcome: Progressing     Problem: Prexisting or High Potential for Compromised Skin Integrity  Goal: Skin integrity is maintained or improved  Description: INTERVENTIONS:  - Identify patients at risk for skin breakdown  - Assess and monitor skin integrity  - Assess and monitor nutrition and hydration status  - Monitor labs   - Assess for incontinence   - Turn and reposition patient  - Assist with mobility/ambulation  - Relieve pressure over bony prominences  - Avoid friction and shearing  - Provide appropriate hygiene as needed including keeping skin clean and dry  - Evaluate need for skin moisturizer/barrier cream  - Collaborate with interdisciplinary team   - Patient/family teaching  - Consider wound  care consult   Outcome: Progressing     Problem: PAIN - ADULT  Goal: Verbalizes/displays adequate comfort level or baseline comfort level  Description: Interventions:  - Encourage patient to monitor pain and request assistance  - Assess pain using appropriate pain scale  - Administer analgesics based on type and severity of pain and evaluate response  - Implement non-pharmacological measures as appropriate and evaluate response  - Consider cultural and social influences on pain and pain management  - Notify physician/advanced practitioner if interventions unsuccessful or patient reports new pain  Outcome: Progressing     Problem: INFECTION - ADULT  Goal: Absence or prevention of progression during hospitalization  Description: INTERVENTIONS:  - Assess and monitor for signs and symptoms of infection  - Monitor lab/diagnostic results  - Monitor all insertion sites, i.e. indwelling lines, tubes, and drains  - Monitor endotracheal if appropriate and nasal secretions for changes in amount and color  - [place] appropriate cooling/warming therapies per order  - Administer medications as ordered  - Instruct and encourage patient and family to use good hand hygiene technique  - Identify and instruct in appropriate isolation precautions for identified infection/condition  Outcome: Progressing  Goal: Absence of fever/infection during neutropenic period  Description: INTERVENTIONS:  - Monitor WBC    Outcome: Progressing     Problem: SAFETY ADULT  Goal: Patient will remain free of falls  Description: INTERVENTIONS:  - Educate patient/family on patient safety including physical limitations  - Instruct patient to call for assistance with activity   - Consult OT/PT to assist with strengthening/mobility   - Keep Call bell within reach  - Keep bed low and locked with side rails adjusted as appropriate  - Keep care items and personal belongings within reach  - Initiate and maintain comfort rounds  - Make Fall Risk Sign visible to  staff  - Apply yellow socks and bracelet for high fall risk patients  - Consider moving patient to room near nurses station  Outcome: Progressing  Goal: Maintain or return to baseline ADL function  Description: INTERVENTIONS:  -  Assess patient's ability to carry out ADLs; assess patient's baseline for ADL function and identify physical deficits which impact ability to perform ADLs (bathing, care of mouth/teeth, toileting, grooming, dressing, etc.)  - Assess/evaluate cause of self-care deficits   - Assess range of motion  - Assess patient's mobility; develop plan if impaired  - Assess patient's need for assistive devices and provide as appropriate  - Encourage maximum independence but intervene and supervise when necessary  - Involve family in performance of ADLs  - Assess for home care needs following discharge   - Consider OT consult to assist with ADL evaluation and planning for discharge  - Provide patient education as appropriate  Outcome: Progressing  Goal: Maintains/Returns to pre admission functional level  Description: INTERVENTIONS:  - Perform AM-PAC 6 Click Basic Mobility/ Daily Activity assessment daily.  - Set and communicate daily mobility goal to care team and patient/family/caregiver.   - Collaborate with rehabilitation services on mobility goals if consulted  - Out of bed for toileting  - Record patient progress and toleration of activity level   Outcome: Progressing     Problem: DISCHARGE PLANNING  Goal: Discharge to home or other facility with appropriate resources  Description: INTERVENTIONS:  - Identify barriers to discharge w/patient and caregiver  - Arrange for needed discharge resources and transportation as appropriate  - Identify discharge learning needs (meds, wound care, etc.)  - Arrange for interpretive services to assist at discharge as needed  - Refer to Case Management Department for coordinating discharge planning if the patient needs post-hospital services based on  physician/advanced practitioner order or complex needs related to functional status, cognitive ability, or social support system  Outcome: Progressing     Problem: Knowledge Deficit  Goal: Patient/family/caregiver demonstrates understanding of disease process, treatment plan, medications, and discharge instructions  Description: Complete learning assessment and assess knowledge base.  Interventions:  - Provide teaching at level of understanding  - Provide teaching via preferred learning methods  Outcome: Progressing

## 2025-02-13 NOTE — ASSESSMENT & PLAN NOTE
70y F with Abdominal pain, nausea and vomiting  H/o recurrent SBO often managed at home  CTAP: Findings compatible with small bowel obstruction likely secondary to adhesions. Recommend surgical consultation.         Pt was complaining of nausea throughout the night, NGT tube manipulated by nursing and then felt better. NGT was functioning with gastric content in the tubing, no coffee grounds, no maroon color.   NGT was not discontinued because of nausea, and pt felt more comfortable with waiting for more bowel function,   Pt continues to pass gas, no bowel movement   VSS  UO no record of output entered yet   last 24 h, currently with 400  per nursing this am  Obstruction Series 2/13 , NGT in position, no dilated loops of bowel     -clamp NGT x 4 hours and check residual, pt refused clamp trial yesterday due to nausea and anxiety, wanted to wait one more day   -if residual is low then d/c NGT and start on CLD  -will add Protonix   -ambulation  -will add p.o. pain meds, ok to give via clamped NGT  -continue IVF for adequate urine output  -cont to monitor bowel function,   -ambulate

## 2025-02-13 NOTE — NURSING NOTE
Pt was educated on the importance of having her bed/ chair alarm on.Pt has refused the use of the alarms.

## 2025-02-13 NOTE — PROGRESS NOTES
Progress Note - Surgery-General   Name: Yi Foy 79 y.o. female I MRN: [id number]  Unit/Bed#: -[facility] I Date of Admission: 2/10/2025   Date of Service: 2/13/2025 I Hospital Day: 2    Assessment & Plan  Small bowel obstruction (HCC)  70y F with Abdominal pain, nausea and vomiting  H/o recurrent SBO often managed at home  CTAP: Findings compatible with small bowel obstruction likely secondary to adhesions. Recommend surgical consultation.         Pt was complaining of nausea throughout the night, NGT tube manipulated by nursing and then felt better. NGT was functioning with gastric content in the tubing, no coffee grounds, no maroon color.   NGT was not discontinued because of nausea, and pt felt more comfortable with waiting for more bowel function,   Pt continues to pass gas, no bowel movement   VSS  UO no record of output entered yet   last 24 h, currently with 400  per nursing this am  Obstruction Series 2/13 , NGT in position, no dilated loops of bowel     -clamp NGT x 4 hours and check residual, pt refused clamp trial yesterday due to nausea and anxiety, wanted to wait one more day   -if residual is low then d/c NGT and start on CLD  -will add Protonix   -ambulation  -will add p.o. pain meds, ok to give via clamped NGT  -continue IVF for adequate urine output  -cont to monitor bowel function,   -ambulate           Subjective   Pt's  is frustrated with the NGT and what it is putting out.it was functioning with gastric content in the tubing, no coffee grounds or maroon color.  Multiple attempts by nursing throughout the night, tubing was irrigated and continues to function.   Pt states she is putting out gas but no bowel movement, she was nauseous throughout the night and this morning she is not nauseous, she complains of epigastric discomfort.     Objective :  Temp:  [98 °F (36.7 °C)-98.3 °F (36.8 °C)] 98.3 °F (36.8 °C)  HR:  [64] 64  BP: (133-151)/(60-72) 139/68  Resp:  [18-20] 20  SpO2:   [98 %] 98 %  O2 Device: Nasal cannula  Nasal Cannula O2 Flow Rate (L/min):  [2 L/min] 2 L/min    I/O         02/11 0701 02/12 0700 02/12 0701 02/13 0700 02/13 0701 02/14 0700    I.V. (mL/kg) 2150 (18.7)      Total Intake(mL/kg) 2150 (18.7)      Urine (mL/kg/hr) 200 (0.1) 150 (0.1)     Emesis/NG output 150 150 400    Total Output 350 300 400    Net +1800 -300 -400           Unmeasured Urine Occurrence  1 x           Lines/Drains/Airways       Active Status       Name Placement date Placement time Site Days    NG/OG/Enteral Tube Nasogastric 16 Fr Left nare 02/11/25  0122  Left nare  2                  Physical Exam  Constitutional:       Appearance: Normal appearance. She is obese.   Cardiovascular:      Rate and Rhythm: Normal rate and regular rhythm.   Pulmonary:      Effort: Pulmonary effort is normal.      Breath sounds: Normal breath sounds.   Abdominal:      General: Bowel sounds are normal. There is no distension.      Palpations: Abdomen is soft.      Tenderness: There is no abdominal tenderness.   Musculoskeletal:         General: Normal range of motion.   Skin:     General: Skin is warm and dry.   Neurological:      General: No focal deficit present.      Mental Status: She is alert and oriented to person, place, and time.   Psychiatric:         Mood and Affect: Mood normal.         Behavior: Behavior normal.           Lab Results: I have reviewed the following results:  Recent Labs     02/10/25  2122 02/11/25  0518 02/13/25  0605   WBC 13.73* 8.91 7.08   HGB 15.0 12.9 12.0   HCT 45.0 39.8 36.4    134* 116*   SODIUM 138 141 142   K 4.0 3.8 3.8    108 107   CO2 30 28 31   BUN 26* 24 22   CREATININE 0.76 0.63 0.49*   GLUC 125 100 74   MG  --  2.0  --    PHOS  --  3.5  --    AST 25  --   --    ALT 15  --   --    ALB 4.4  --   --    TBILI 0.70  --   --    ALKPHOS 66  --   --    LACTICACID 1.8  --   --        Isamar SIMMONS

## 2025-02-13 NOTE — CASE MANAGEMENT
Case Management Progress Note    Patient name Yi Foy  Location /-01 MRN [id number]  : 1945 Date 2025       LOS (days): 2  Geometric Mean LOS (GMLOS) (days): 3  Days to GMLOS:1        OBJECTIVE:        Current admission status: Inpatient  Preferred Pharmacy:   [id number] [place] [facility] [address]  [facility] [place] [address]  Phone: [phone] Fax: [phone]    Primary Care Provider: Garry Gama DO    Primary Insurance: MEDICARE  Secondary Insurance:     PROGRESS NOTE:  Per chart review, surgery is trialing clamping of NG tube for 4 hours and checking for residual. If residual is low, can trial CLD. Discharge pending patient response to treatment

## 2025-02-14 VITALS
HEART RATE: 92 BPM | RESPIRATION RATE: 16 BRPM | HEIGHT: 58 IN | OXYGEN SATURATION: 99 % | TEMPERATURE: 97.5 F | DIASTOLIC BLOOD PRESSURE: 70 MMHG | WEIGHT: 254 LBS | BODY MASS INDEX: 53.32 KG/M2 | SYSTOLIC BLOOD PRESSURE: 150 MMHG

## 2025-02-14 LAB
ANION GAP SERPL CALCULATED.3IONS-SCNC: 5 MMOL/L (ref 4–13)
BASOPHILS # BLD AUTO: 0.02 THOUSANDS/ÂΜL (ref 0–0.1)
BASOPHILS NFR BLD AUTO: 0 % (ref 0–1)
BUN SERPL-MCNC: 12 MG/DL (ref 5–25)
CALCIUM SERPL-MCNC: 8.9 MG/DL (ref 8.4–10.2)
CHLORIDE SERPL-SCNC: 105 MMOL/L (ref 96–108)
CO2 SERPL-SCNC: 31 MMOL/L (ref 21–32)
CREAT SERPL-MCNC: 0.48 MG/DL (ref 0.6–1.3)
EOSINOPHIL # BLD AUTO: 0.3 THOUSAND/ÂΜL (ref 0–0.61)
EOSINOPHIL NFR BLD AUTO: 5 % (ref 0–6)
ERYTHROCYTE [DISTWIDTH] IN BLOOD BY AUTOMATED COUNT: 13.4 % (ref 11.6–15.1)
GFR SERPL CREATININE-BSD FRML MDRD: 93 ML/MIN/1.73SQ M
GLUCOSE SERPL-MCNC: 90 MG/DL (ref 65–140)
HCT VFR BLD AUTO: 35.5 % (ref 34.8–46.1)
HGB BLD-MCNC: 11.6 G/DL (ref 11.5–15.4)
IMM GRANULOCYTES # BLD AUTO: 0.02 THOUSAND/UL (ref 0–0.2)
IMM GRANULOCYTES NFR BLD AUTO: 0 % (ref 0–2)
LYMPHOCYTES # BLD AUTO: 1.68 THOUSANDS/ÂΜL (ref 0.6–4.47)
LYMPHOCYTES NFR BLD AUTO: 30 % (ref 14–44)
MCH RBC QN AUTO: 29.9 PG (ref 26.8–34.3)
MCHC RBC AUTO-ENTMCNC: 32.7 G/DL (ref 31.4–37.4)
MCV RBC AUTO: 92 FL (ref 82–98)
MONOCYTES # BLD AUTO: 0.38 THOUSAND/ÂΜL (ref 0.17–1.22)
MONOCYTES NFR BLD AUTO: 7 % (ref 4–12)
NEUTROPHILS # BLD AUTO: 3.23 THOUSANDS/ÂΜL (ref 1.85–7.62)
NEUTS SEG NFR BLD AUTO: 58 % (ref 43–75)
NRBC BLD AUTO-RTO: 0 /100 WBCS
PLATELET # BLD AUTO: 111 THOUSANDS/UL (ref 149–390)
PMV BLD AUTO: 10.9 FL (ref 8.9–12.7)
POTASSIUM SERPL-SCNC: 3.3 MMOL/L (ref 3.5–5.3)
RBC # BLD AUTO: 3.88 MILLION/UL (ref 3.81–5.12)
SODIUM SERPL-SCNC: 141 MMOL/L (ref 135–147)
WBC # BLD AUTO: 5.63 THOUSAND/UL (ref 4.31–10.16)

## 2025-02-14 PROCEDURE — 99238 HOSP IP/OBS DSCHRG MGMT 30/<: CPT | Performed by: CLINICAL NURSE SPECIALIST

## 2025-02-14 PROCEDURE — NC001 PR NO CHARGE: Performed by: CLINICAL NURSE SPECIALIST

## 2025-02-14 PROCEDURE — 80048 BASIC METABOLIC PNL TOTAL CA: CPT | Performed by: PHYSICIAN ASSISTANT

## 2025-02-14 PROCEDURE — 85025 COMPLETE CBC W/AUTO DIFF WBC: CPT | Performed by: PHYSICIAN ASSISTANT

## 2025-02-14 RX ORDER — POTASSIUM CHLORIDE 1500 MG/1
40 TABLET, EXTENDED RELEASE ORAL ONCE
Status: COMPLETED | OUTPATIENT
Start: 2025-02-14 | End: 2025-02-14

## 2025-02-14 RX ADMIN — SODIUM CHLORIDE, SODIUM LACTATE, POTASSIUM CHLORIDE, AND CALCIUM CHLORIDE 125 ML/HR: .6; .31; .03; .02 INJECTION, SOLUTION INTRAVENOUS at 09:00

## 2025-02-14 RX ADMIN — POTASSIUM CHLORIDE 40 MEQ: 1500 TABLET, EXTENDED RELEASE ORAL at 15:51

## 2025-02-14 RX ADMIN — SODIUM CHLORIDE, SODIUM LACTATE, POTASSIUM CHLORIDE, AND CALCIUM CHLORIDE 125 ML/HR: .6; .31; .03; .02 INJECTION, SOLUTION INTRAVENOUS at 00:32

## 2025-02-14 RX ADMIN — HEPARIN SODIUM 7500 UNITS: 5000 INJECTION INTRAVENOUS; SUBCUTANEOUS at 05:45

## 2025-02-14 RX ADMIN — PANTOPRAZOLE SODIUM 40 MG: 40 INJECTION, POWDER, FOR SOLUTION INTRAVENOUS at 08:58

## 2025-02-14 NOTE — PROGRESS NOTES
"Progress Note - Surgery-General   Name: Yi Foy 79 y.o. female I MRN: [id number]  Unit/Bed#: -01 I Date of Admission: 2/10/2025   Date of Service: 2/14/2025 I Hospital Day: 3     Assessment & Plan  Small bowel obstruction (HCC)  70y F with Abdominal pain, nausea and vomiting  H/o recurrent SBO often managed at home  CTAP: Findings compatible with small bowel obstruction likely secondary to adhesions. Recommend surgical consultation.       Plan:  AVSS  Adequate urine output  NGT discontinued yesterday and patient was started on clear liquids  Abdominal pain has resolved    -Patient was able to tolerate clear liquids without increasing abdominal pain, nausea, vomiting.  Continue venting diet as tolerated   -Discontinue IVF  -Tentative discharge later today patient is able to tolerate diet advancement and continues to have bowel function  -Courage ambulation out of bed  -Continue to monitor vitals and lab results  -Serial abdominal exams      Subjective/Objective   Chief Complaint: None    Subjective: No acute events overnight.  Patient reports abdominal pain has completely resolved and continues to have bowel function.  Patient was able to tolerate clear liquid diet without any increasing abdominal pain, nausea, vomiting.  She denies any chest pain, shortness of breath, palpitations, fevers, chills.    Objective:     Blood pressure 150/70, pulse 62, temperature 97.5 °F (36.4 °C), resp. rate 16, height 4' 10\" (1.473 m), weight 115 kg (254 lb), SpO2 97%, not currently breastfeeding.  Body mass index is 53.09 kg/m².    I/O         02/12 0701  02/13 0700 02/13 0701  02/14 0700 02/14 0701  02/15 0700    P.O.  960 228    I.V. (mL/kg)  625 (5.4) 940 (8.2)    Total Intake(mL/kg)  1585 (13.8) 1168 (10.2)    Urine (mL/kg/hr) 550 (0.2) 900 (0.3) 1500 (2.5)    Emesis/NG output 150 420     Total Output 700 1320 1500    Net -700 +265 -332           Unmeasured Urine Occurrence 1 x              Invasive Devices       " "Peripheral Intravenous Line  Duration             Peripheral IV 02/10/25 Distal;Left;Upper;Ventral (anterior) Arm 3 days                    Physical Exam: /70   Pulse 62   Temp 97.5 °F (36.4 °C)   Resp 16   Ht 4' 10\" (1.473 m)   Wt 115 kg (254 lb)   SpO2 97%   BMI 53.09 kg/m²   General appearance: alert and oriented, in no acute distress  Lungs: clear to auscultation bilaterally  Heart: regular rate and rhythm  Abdomen: soft, non-tender; bowel sounds normal; no masses,  no organomegaly  Extremities: extremities normal, warm and well-perfused; no cyanosis, clubbing, or edema    Labs   Recent Results (from the past 24 hours)   CBC and differential    Collection Time: 02/14/25  6:13 AM   Result Value Ref Range    WBC 5.63 4.31 - 10.16 Thousand/uL    RBC 3.88 3.81 - 5.12 Million/uL    Hemoglobin 11.6 11.5 - 15.4 g/dL    Hematocrit 35.5 34.8 - 46.1 %    MCV 92 82 - 98 fL    MCH 29.9 26.8 - 34.3 pg    MCHC 32.7 31.4 - 37.4 g/dL    RDW 13.4 11.6 - 15.1 %    MPV 10.9 8.9 - 12.7 fL    Platelets 111 (L) 149 - 390 Thousands/uL    nRBC 0 /100 WBCs    Segmented % 58 43 - 75 %    Immature Grans % 0 0 - 2 %    Lymphocytes % 30 14 - 44 %    Monocytes % 7 4 - 12 %    Eosinophils Relative 5 0 - 6 %    Basophils Relative 0 0 - 1 %    Absolute Neutrophils 3.23 1.85 - 7.62 Thousands/µL    Absolute Immature Grans 0.02 0.00 - 0.20 Thousand/uL    Absolute Lymphocytes 1.68 0.60 - 4.47 Thousands/µL    Absolute Monocytes 0.38 0.17 - 1.22 Thousand/µL    Eosinophils Absolute 0.30 0.00 - 0.61 Thousand/µL    Basophils Absolute 0.02 0.00 - 0.10 Thousands/µL   Basic metabolic panel    Collection Time: 02/14/25  6:13 AM   Result Value Ref Range    Sodium 141 135 - 147 mmol/L    Potassium 3.3 (L) 3.5 - 5.3 mmol/L    Chloride 105 96 - 108 mmol/L    CO2 31 21 - 32 mmol/L    ANION GAP 5 4 - 13 mmol/L    BUN 12 5 - 25 mg/dL    Creatinine 0.48 (L) 0.60 - 1.30 mg/dL    Glucose 90 65 - 140 mg/dL    Calcium 8.9 8.4 - 10.2 mg/dL    eGFR 93 " ml/min/1.73sq m        Imaging and other studies:  XR abdomen obstruction series  Result Date: 2/12/2025  Impression: Endogastric tube in the stomach. No dilated bowel loops are seen at this time. Small pleural effusions. Left basilar atelectasis. Workstation performed: [id number]     XR chest 1 view portable  Result Date: 2/11/2025  Impression: Feeding tube tip lying in the left upper quadrant below the diaphragm in appropriate position Hypoinflated lungs Workstation performed: [id number]     CT abdomen pelvis with contrast  Result Date: 2/11/2025  Impression: Findings compatible with small bowel obstruction likely secondary to adhesions. Recommend surgical consultation. I personally discussed this study with ANKITA CORBIN on 2/11/2025 12:04 AM. Workstation performed: [id number]       VTE Pharmacologic Prophylaxis: VTE covered by:  heparin (porcine), Subcutaneous, 7,500 Units at 02/14/25 0545     VTE Mechanical Prophylaxis: sequential compression device    Shan Choudhary PA-C  2/14/2025

## 2025-02-14 NOTE — PLAN OF CARE
Problem: Nutrition/Hydration-ADULT  Goal: Nutrient/Hydration intake appropriate for improving, restoring or maintaining nutritional needs  Description: Monitor and assess patient's nutrition/hydration status for malnutrition. Collaborate with interdisciplinary team and initiate plan and interventions as ordered.  Monitor patient's weight and dietary intake as ordered or per policy. Utilize nutrition screening tool and intervene as necessary. Determine patient's food preferences and provide high-protein, high-caloric foods as appropriate.     INTERVENTIONS:  - Monitor oral intake, urinary output, labs, and treatment plans  - Assess nutrition and hydration status and recommend course of action  - Evaluate amount of meals eaten  - Assist patient with eating if necessary   - Allow adequate time for meals  - Recommend/ encourage appropriate diets, oral nutritional supplements, and vitamin/mineral supplements  - Order, calculate, and assess calorie counts as needed  - Recommend, monitor, and adjust tube feedings and TPN/PPN based on assessed needs  - Assess need for intravenous fluids  - Provide specific nutrition/hydration education as appropriate  - Include patient/family/caregiver in decisions related to nutrition  Outcome: Progressing     Problem: SAFETY ADULT  Goal: Patient will remain free of falls  Description: INTERVENTIONS:  - Educate patient/family on patient safety including physical limitations  - Instruct patient to call for assistance with activity   - Consult OT/PT to assist with strengthening/mobility   - Keep Call bell within reach  - Keep bed low and locked with side rails adjusted as appropriate  - Keep care items and personal belongings within reach  - Initiate and maintain comfort rounds  - Make Fall Risk Sign visible to staff  - Apply yellow socks and bracelet for high fall risk patients  - Consider moving patient to room near nurses station  Outcome: Progressing     Problem: SAFETY ADULT  Goal:  Maintains/Returns to pre admission functional level  Description: INTERVENTIONS:  - Perform AM-PAC 6 Click Basic Mobility/ Daily Activity assessment daily.  - Set and communicate daily mobility goal to care team and patient/family/caregiver.   - Collaborate with rehabilitation services on mobility goals if consulted  - Perform Range of Motion 3 times a day.  - Reposition patient every 3 hours.  - Dangle patient 3 times a day  - Stand patient 3 times a day  - Ambulate patient 3 times a day  - Out of bed to chair 3 times a day   - Out of bed for meals 3 times a day  - Out of bed for toileting  - Record patient progress and toleration of activity level   Outcome: Progressing     Problem: Knowledge Deficit  Goal: Patient/family/caregiver demonstrates understanding of disease process, treatment plan, medications, and discharge instructions  Description: Complete learning assessment and assess knowledge base.  Interventions:  - Provide teaching at level of understanding  - Provide teaching via preferred learning methods  Outcome: Progressing

## 2025-02-14 NOTE — CASE MANAGEMENT
Case Management Discharge Planning Note    Patient name Yi Foy  Location /-01 MRN [id number]  : 1945 Date 2025       Current Admission Date: 2/10/2025  Current Admission Diagnosis:Small bowel obstruction (HCC)   Patient Active Problem List    Diagnosis Date Noted Date Diagnosed    Recurrent major depression in full remission (HCC) 2023     History of breast cancer 2022     Small bowel obstruction (HCC) 2021     Thrombocytopenia (HCC) 2021     Other irritable bowel syndrome 2020     CASEY (obstructive sleep apnea)      Vitamin D deficiency 07/10/2018     Borderline hyperlipidemia 05/10/2017     Osteopenia of multiple sites 2016     Morbid obesity with BMI of 45.0-49.9, adult ([facility]) 2016     Prediabetes 2014       LOS (days): 3  Geometric Mean LOS (GMLOS) (days): 3  Days to GMLOS:0.2     OBJECTIVE:  Risk of Unplanned Readmission Score: 7.64         Current admission status: Inpatient   [facility]erred Pharmacy:   [id number] [place] [facility] [address] [address]  Phone: [phone] Fax: [phone]    Primary Care Provider: Garry Gama DO    Primary Insurance: MEDICARE  Secondary Insurance:     DISCHARGE DETAILS:     IMM Given (Date):: 25  IMM Given to:: Patient (CM reviewed IMM with pt at bedside. Patient reported understanding their rights and declined any questions or concerns. Patient was given a copy and signed copy placed in medical records.)

## 2025-02-14 NOTE — DISCHARGE SUMMARY
Discharge Summary - Surgery-General   Name: Yi Foy 79 y.o. female I MRN: [id number]  Unit/Bed#: -01 I Date of Admission: 2/10/2025   Date of Service: 2/14/2025 I Hospital Day: 3    Admission Date: 2/10/2025 2010  Discharge Date: 02/14/25  Admitting Diagnosis: SBO (small bowel obstruction) ([facility]) [K56.609]  Elevated blood pressure reading [R03.0]  Abdominal pain [R10.9]  Discharge Diagnosis:   Medical Problems       Resolved Problems  Date Reviewed: 1/3/2025   None         HPI: Yi Foy is a 79 y.o. female who presents with Abdominal pain, nausea, and vomiting.  Patient reports having some abdominal pain starting on Sunday evening that progressed to became more severe yesterday with associated nausea vomiting.  This prompted her to come to the emergency department for evaluation.  Patient states she does occasionally get the symptoms at home and will manage them by only taking clears or decrease oral intake and symptoms are resolved.  Patient states she has not had nausea or vomiting at home in the past.  She does have history of several abdominal surgeries including a colectomy and 2 ventral hernia repairs.    Patient had NG tube placed in the emergency department.  Today she is feeling much better.  She denies any abdominal pain or nausea at the time however she did just have medication.  She has no further abdominal distention.  She denies any flatus.  Her last bowel movement was yesterday.(HPI obtained from H&P written by Manju Khan PA-C.)    Procedures Performed: No orders of the defined types were placed in this encounter.      Summary of Hospital Course: Patient was admitted for suspected to be a small bowel obstruction and was managed conservatively.  With conservative management and the patient had significant improvement and began passing gas and tolerating diet.  Patient was able to have a slow diet advancement without any increasing abdominal pain, nausea, vomiting.  At time  of discharge the patient's vitals, physical exam, lab results were within normal range.    Significant Findings, Care, Treatment and Services Provided: NGT placement, and small bowel obstruction    Complications: None    Condition at Discharge: good       Discharge instructions/Information to patient and family:   See After Visit Summary (AVS) for information provided to patient and family.      Provisions for Follow-Up Care:  See after visit summary for information related to follow-up care and any pertinent home health orders.      PCP: Garry Gama DO    Disposition: Home    Planned Readmission: No     Discharge Medications:  See after visit summary for reconciled discharge medications provided to patient and family.      Discharge Statement:  I have spent a total time of 30 minutes in caring for this patient on the day of the visit/encounter. >30 minutes of time was spent on: Instructions for management.

## 2025-02-14 NOTE — ASSESSMENT & PLAN NOTE
70y F with Abdominal pain, nausea and vomiting  H/o recurrent SBO often managed at home  CTAP: Findings compatible with small bowel obstruction likely secondary to adhesions. Recommend surgical consultation.       Plan:  AVSS  Adequate urine output  NGT discontinued yesterday and patient was started on clear liquids  Abdominal pain has resolved    -Patient was able to tolerate clear liquids without increasing abdominal pain, nausea, vomiting.  Continue venting diet as tolerated   -Discontinue IVF  -Tentative discharge later today patient is able to tolerate diet advancement and continues to have bowel function  -Courage ambulation out of bed  -Continue to monitor vitals and lab results  -Serial abdominal exams

## 2025-02-17 ENCOUNTER — TRANSITIONAL CARE MANAGEMENT (OUTPATIENT)
Age: 80
End: 2025-02-17

## 2025-02-19 ENCOUNTER — TELEPHONE (OUTPATIENT)
Age: 80
End: 2025-02-19

## 2025-02-19 NOTE — TELEPHONE ENCOUNTER
Pt called stating she was discharged from the hospital this past Friday and needs to schedule a transition of care appointment.    A warm transfer was done to office clerical for further assistance in scheduling patient for a transition of care.

## 2025-02-21 ENCOUNTER — APPOINTMENT (OUTPATIENT)
Age: 80
End: 2025-02-21
Payer: MEDICARE

## 2025-02-21 ENCOUNTER — OFFICE VISIT (OUTPATIENT)
Age: 80
End: 2025-02-21
Payer: MEDICARE

## 2025-02-21 VITALS
OXYGEN SATURATION: 96 % | DIASTOLIC BLOOD PRESSURE: 74 MMHG | SYSTOLIC BLOOD PRESSURE: 126 MMHG | HEIGHT: 58 IN | BODY MASS INDEX: 48.15 KG/M2 | WEIGHT: 229.4 LBS | TEMPERATURE: 96.9 F | HEART RATE: 62 BPM | RESPIRATION RATE: 18 BRPM

## 2025-02-21 DIAGNOSIS — E87.6 HYPOKALEMIA: ICD-10-CM

## 2025-02-21 DIAGNOSIS — K56.609 SMALL BOWEL OBSTRUCTION (HCC): Primary | ICD-10-CM

## 2025-02-21 DIAGNOSIS — E66.01 MORBID OBESITY WITH BMI OF 45.0-49.9, ADULT (HCC): ICD-10-CM

## 2025-02-21 LAB
ANION GAP SERPL CALCULATED.3IONS-SCNC: 10 MMOL/L (ref 4–13)
BUN SERPL-MCNC: 20 MG/DL (ref 5–25)
CALCIUM SERPL-MCNC: 9.8 MG/DL (ref 8.4–10.2)
CHLORIDE SERPL-SCNC: 103 MMOL/L (ref 96–108)
CO2 SERPL-SCNC: 30 MMOL/L (ref 21–32)
CREAT SERPL-MCNC: 0.66 MG/DL (ref 0.6–1.3)
GFR SERPL CREATININE-BSD FRML MDRD: 84 ML/MIN/1.73SQ M
GLUCOSE SERPL-MCNC: 69 MG/DL (ref 65–140)
POTASSIUM SERPL-SCNC: 4.1 MMOL/L (ref 3.5–5.3)
SODIUM SERPL-SCNC: 143 MMOL/L (ref 135–147)

## 2025-02-21 PROCEDURE — 80048 BASIC METABOLIC PNL TOTAL CA: CPT

## 2025-02-21 PROCEDURE — 99495 TRANSJ CARE MGMT MOD F2F 14D: CPT | Performed by: INTERNAL MEDICINE

## 2025-02-21 PROCEDURE — 36415 COLL VENOUS BLD VENIPUNCTURE: CPT

## 2025-02-21 NOTE — PROGRESS NOTES
Transition of Care Visit  Name: Yi Foy      : 1945      MRN: [id number]  Encounter Provider: Garry Gama DO  Encounter Date: 2025   Encounter department: [facility] [place]    Assessment & Plan  Small bowel obstruction (HCC)    Symptoms have resolved. Continue to monitor.    Morbid obesity with BMI of 45.0-49.9, adult (HCC)    Weight Loss Tips:     Weight loss is NOT easy for anyone.  Achieving weight loss goals have been a challenge for many people and a quick fad diet will NOT work the way the product or commercials promises us it will work.  Life changes are long lasting.      Weight loss comes in time and with changes that are personalized for you, NOT the same for everyone.       7% weight loss of your body weight will MAKE a HUGE difference in your health status including improvement to Diabetes and high blood pressure management.       Here are some quick calculations for 7% weight loss:      If you weigh 180 pounds then GOAL 7% weight loss would be 12.5 pounds.  If you weigh 190 pounds then GOAL 7% weight loss would be 13.0 pounds.  If you weigh 200 pounds then GOAL 7% weight loss would be 14.0 pounds.  If you weigh 210 pounds then GOAL 7% weight loss would be 14.7 pounds.  If you weigh 220 pounds then GOAL 7% weight loss would be 15.5 pounds.  If you weigh 230 pounds then GOAL 7% weight loss would be 16.0 pounds.  If you weigh 240 pounds then GOAL 7% weight loss would be 17.0 pounds.     If you weight over 250 pounds then a weight loss of >17 pounds is recommended.       Quick tips:  1. Track food intake with an sheri or tracker like www.SpiceCSM.com, sparkpeople.com, loseit.com, calorieking.com.   Reduce calorie intake by 500 a day to help lose weight but do NOT eat less than 1400 calories a day as this will turn off your metabolism and result in no weight loss at all.       2. Higher protein and lower carbohydrate intake works well for many but do NOT  eliminate ONE food group completely as this is difficult to maintain in the long-term and will result in gaining back the weight quickly.       3. Track your steps with sheri on phone, Heart sheri on iphone, or a pedometer such as fitbit or jawbone Up 24 to help you reach 10,000 steps daily.       4.  Many patients do well with a carbohydrate level daily that is less than 200 g of carbs.  In addition the protein level should be over 75 g of protein in the full day.      Hypokalemia    Check updated potassium level    Orders:  •  Basic metabolic panel; Future         History of Present Illness     Transitional Care Management Review:   Yi Foy is a 79 y.o. female here for TCM follow up.     During the TCM phone call patient stated:  TCM Call       Date and time call was made  2/18/2025 11:55 AM    Hospital care reviewed  Records reviewed    Patient was hospitialized at  [facility]    Date of Admission  02/10/25    Date of discharge  02/14/25    Diagnosis  Small bowel obstruction    Disposition  Home     History of Present Illness  The patient is a 79-year-old female who presents for a hospital follow-up.    She was admitted with concerns for small bowel obstruction, experiencing abdominal pain, nausea, and vomiting. An NG tube was placed, and she started to show improvement with passing gas and tolerating diet. She had a slow advancement of her diet without any increase in symptoms and did not require surgery. She reports a significant improvement in her condition over the past week. She maintains regular bowel movements at home, facilitated by daily intake of stool softeners. Initially, she was using Metamucil but has since switched to MiraLAX, which she finds beneficial. She expresses caution regarding her dietary habits, adhering to small, frequent meals and ensuring adequate fiber intake. She avoids overeating to prevent potential blockages. This morning, she consumed only a cup of coffee and does  "not experience nausea or stomach sickness. However, she describes a sensation of fullness in her stomach, akin to indigestion, but without associated heartburn or cramping. She recalls previous hospitalizations due to similar symptoms, which were more severe during her last visit, characterized by liquid vomiting. She received potassium supplementation during her hospital stay.     Review of Systems   Constitutional: Negative.    Respiratory: Negative.     Cardiovascular: Negative.    Gastrointestinal: Negative.      Objective   /74 (BP Location: Left arm, Patient Position: Sitting, Cuff Size: Large)   Pulse 62   Temp (!) 96.9 °F (36.1 °C) (Tympanic)   Resp 18   Ht 4' 10\" (1.473 m)   Wt 104 kg (229 lb 6.4 oz)   SpO2 96%   BMI 47.94 kg/m²     Physical Exam  Constitutional:       General: She is not in acute distress.     Appearance: She is obese. She is not ill-appearing.   Cardiovascular:      Rate and Rhythm: Normal rate and regular rhythm.      Heart sounds: No murmur heard.  Pulmonary:      Effort: Pulmonary effort is normal. No respiratory distress.      Breath sounds: No wheezing.   Abdominal:      General: Abdomen is protuberant. Bowel sounds are normal. There is no distension.      Tenderness: There is no abdominal tenderness.   Musculoskeletal:      Right lower leg: No edema.      Left lower leg: No edema.   Neurological:      Mental Status: She is alert.       Medications have been reviewed by provider in current encounter    Garry DO Lanette   "

## 2025-02-21 NOTE — ASSESSMENT & PLAN NOTE
Weight Loss Tips:     Weight loss is NOT easy for anyone.  Achieving weight loss goals have been a challenge for many people and a quick fad diet will NOT work the way the product or commercials promises us it will work.  Life changes are long lasting.      Weight loss comes in time and with changes that are personalized for you, NOT the same for everyone.       7% weight loss of your body weight will MAKE a HUGE difference in your health status including improvement to Diabetes and high blood pressure management.       Here are some quick calculations for 7% weight loss:      If you weigh 180 pounds then GOAL 7% weight loss would be 12.5 pounds.  If you weigh 190 pounds then GOAL 7% weight loss would be 13.0 pounds.  If you weigh 200 pounds then GOAL 7% weight loss would be 14.0 pounds.  If you weigh 210 pounds then GOAL 7% weight loss would be 14.7 pounds.  If you weigh 220 pounds then GOAL 7% weight loss would be 15.5 pounds.  If you weigh 230 pounds then GOAL 7% weight loss would be 16.0 pounds.  If you weigh 240 pounds then GOAL 7% weight loss would be 17.0 pounds.     If you weight over 250 pounds then a weight loss of >17 pounds is recommended.       Quick tips:  1. Track food intake with an sheri or tracker like www.nlighten Technologiespal.com, sparkpeople.com, loseit.com, calorieking.com.   Reduce calorie intake by 500 a day to help lose weight but do NOT eat less than 1400 calories a day as this will turn off your metabolism and result in no weight loss at all.       2. Higher protein and lower carbohydrate intake works well for many but do NOT eliminate ONE food group completely as this is difficult to maintain in the long-term and will result in gaining back the weight quickly.       3. Track your steps with sheri on phone, Heart sheri on iphone, or a pedometer such as fitbit or AssertIDbone Up 24 to help you reach 10,000 steps daily.       4.  Many patients do well with a carbohydrate level daily that is less than 200 g of  carbs.  In addition the protein level should be over 75 g of protein in the full day.

## 2025-02-23 ENCOUNTER — RESULTS FOLLOW-UP (OUTPATIENT)
Age: 80
End: 2025-02-23

## 2025-04-24 ENCOUNTER — APPOINTMENT (OUTPATIENT)
Age: 80
End: 2025-04-24
Payer: MEDICARE

## 2025-04-24 ENCOUNTER — OFFICE VISIT (OUTPATIENT)
Age: 80
End: 2025-04-24
Payer: MEDICARE

## 2025-04-24 VITALS
OXYGEN SATURATION: 96 % | RESPIRATION RATE: 14 BRPM | SYSTOLIC BLOOD PRESSURE: 124 MMHG | WEIGHT: 231.4 LBS | HEART RATE: 68 BPM | HEIGHT: 58 IN | BODY MASS INDEX: 48.57 KG/M2 | DIASTOLIC BLOOD PRESSURE: 74 MMHG | TEMPERATURE: 98.4 F

## 2025-04-24 DIAGNOSIS — R39.9 UTI SYMPTOMS: Primary | ICD-10-CM

## 2025-04-24 DIAGNOSIS — R39.9 UTI SYMPTOMS: ICD-10-CM

## 2025-04-24 LAB
BACTERIA UR QL AUTO: ABNORMAL /HPF
BILIRUB UR QL STRIP: ABNORMAL
CLARITY UR: CLEAR
COLOR UR: ABNORMAL
GLUCOSE UR STRIP-MCNC: NEGATIVE MG/DL
HGB UR QL STRIP.AUTO: ABNORMAL
KETONES UR STRIP-MCNC: NEGATIVE MG/DL
LEUKOCYTE ESTERASE UR QL STRIP: ABNORMAL
NITRITE UR QL STRIP: POSITIVE
NON-SQ EPI CELLS URNS QL MICRO: ABNORMAL /HPF
PH UR STRIP.AUTO: 6 [PH]
PROT UR STRIP-MCNC: ABNORMAL MG/DL
RBC #/AREA URNS AUTO: ABNORMAL /HPF
SP GR UR STRIP.AUTO: 1.01 (ref 1–1.03)
UROBILINOGEN UR STRIP-ACNC: 2 MG/DL
WBC #/AREA URNS AUTO: ABNORMAL /HPF

## 2025-04-24 PROCEDURE — 87186 SC STD MICRODIL/AGAR DIL: CPT

## 2025-04-24 PROCEDURE — 81001 URINALYSIS AUTO W/SCOPE: CPT

## 2025-04-24 PROCEDURE — 87077 CULTURE AEROBIC IDENTIFY: CPT

## 2025-04-24 PROCEDURE — 99214 OFFICE O/P EST MOD 30 MIN: CPT

## 2025-04-24 PROCEDURE — 87086 URINE CULTURE/COLONY COUNT: CPT

## 2025-04-24 RX ORDER — CEPHALEXIN 500 MG/1
500 CAPSULE ORAL EVERY 12 HOURS SCHEDULED
Qty: 10 CAPSULE | Refills: 0 | Status: SHIPPED | OUTPATIENT
Start: 2025-04-24 | End: 2025-04-28 | Stop reason: SDUPTHER

## 2025-04-24 NOTE — PROGRESS NOTES
Name: Yi Foy      : 1945      MRN: [id number]  Encounter Provider: Jennifer Lee PA-C  Encounter Date: 2025   Encounter department: [facility] [place]  :  Assessment & Plan  UTI symptoms  Frequent UTIs in past.  Has been on cephalexin based on sensitivities.  She has been following Urology's recommendations with avoiding constipation, soap, taking cranberry, do Kegel exercises.  She has gone a year between UTIs.    Needs to follow up with urology - likely related to her prolapse.  Hx of breast cancer but she is on topical estrogen cream.  Was told Botox was not an option for her.  She has to wear a pad all the time.  May be a candidate for Hiprex and she needs to discuss this with Urology.  Orders:  •  UA w Reflex to Microscopic w Reflex to Culture; Future  •  cephalexin (KEFLEX) 500 mg capsule; Take 1 capsule (500 mg total) by mouth every 12 (twelve) hours for 5 days           History of Present Illness   Difficulty Urinating   This is a new problem. The problem occurs every urination. The quality of the pain is described as burning. The pain is at a severity of 5/10. The pain is moderate. There has been no fever. She is Not sexually active. There is No history of pyelonephritis. Associated symptoms include frequency, hesitancy and urgency. Pertinent negatives include no chills, discharge, flank pain, hematuria, nausea, sweats or vomiting. Treatments tried: Azo. The treatment provided no relief. Her past medical history is significant for recurrent UTIs. There is no history of kidney stones or a urological procedure.         Review of Systems   Constitutional:  Negative for chills.   Gastrointestinal:  Negative for nausea and vomiting.   Genitourinary:  Positive for dysuria, frequency, hesitancy and urgency. Negative for flank pain and hematuria.       Objective   /74 (BP Location: Left arm, Patient Position: Sitting, Cuff Size: Large)   Pulse 68   Temp 98.4  "°F (36.9 °C) (Tympanic)   Resp 14   Ht 4' 10\" (1.473 m)   Wt 105 kg (231 lb 6.4 oz)   SpO2 96%   BMI 48.36 kg/m²      Physical Exam  Constitutional:       General: She is not in acute distress.     Appearance: She is not ill-appearing or toxic-appearing.   Cardiovascular:      Rate and Rhythm: Normal rate and regular rhythm.      Heart sounds: Normal heart sounds.   Pulmonary:      Effort: Pulmonary effort is normal. No respiratory distress.      Breath sounds: Normal breath sounds.   Abdominal:      General: Bowel sounds are normal. There is no distension.      Palpations: Abdomen is soft.      Tenderness: There is no abdominal tenderness. There is no right CVA tenderness or left CVA tenderness.   Neurological:      Mental Status: She is alert.         "

## 2025-04-25 ENCOUNTER — RESULTS FOLLOW-UP (OUTPATIENT)
Age: 80
End: 2025-04-25

## 2025-04-26 LAB — BACTERIA UR CULT: ABNORMAL

## 2025-04-28 DIAGNOSIS — R39.9 UTI SYMPTOMS: ICD-10-CM

## 2025-04-28 RX ORDER — CEPHALEXIN 500 MG/1
500 CAPSULE ORAL EVERY 12 HOURS SCHEDULED
Qty: 4 CAPSULE | Refills: 0 | Status: SHIPPED | OUTPATIENT
Start: 2025-04-28 | End: 2025-04-30

## 2025-04-28 NOTE — TELEPHONE ENCOUNTER
----- Message from Steffen Oneil MD sent at 4/28/2025 12:47 PM EDT -----  Urine culture showing her current antibiotic should cover the bacteria growing. Continue antibiotics to completion

## 2025-04-28 NOTE — TELEPHONE ENCOUNTER
----- Message from Steffen Oneil MD sent at 4/28/2025  2:04 PM EDT -----  Can extend antibiotics for two more days. 4 more pills sent to the pharmacy. Can also continue Azo. If still not improved with this treatment, she needs to follow up for further management/workup. As April said at their appt, also recommend follow up   with urology

## 2025-05-05 NOTE — PROGRESS NOTES
Name: Yi Foy      : 1945      MRN: [id number]  Encounter Provider: Thelma Galarza PA-C  Encounter Date: 2025   Encounter department: [facility] UROLOGY [place]  :  Assessment & Plan  Urinary tract infection without hematuria, site unspecified  Resolved   UA today negative for infection or blood  PVR today 73 mL  CT AP w contrast 2/10/25 - negative from  standpoint  UC 25 - positive for 80,000-89,000 E. Coli and patient was treated with Keflex  Discussed conservative measures with adequate hydration, avoidance of bladder irritants, avoidance of constipation, daily cranberry/probiotics, timed voiding, double voiding  Continue Estrace and recommend coconut oil  Asymptomatic from UTI at this time  Follow with gyn   Orders:    POCT Measure PVR    POCT urine dip    Stress incontinence of urine  Discussed Kegels  Discussed PFPT. Declines   Discussed pessary vs surgical intervention. Follow up with gyn and Dr. Carranza            History of Present Illness   Yi Foy is a 80 y.o. female who presents for follow up.    Patient last seen in the office in . Has been having UTI symptoms. Recent UC positive and was treated with antibiotics. Recently had SBO. States urinary symptoms have improved, however, having some irritation external vaginal/labial area on left.  Does use Estrace and probiotics.     Review of Systems   Constitutional:  Negative for activity change, appetite change, chills and fever.   HENT:  Negative for congestion and trouble swallowing.    Respiratory:  Negative for cough and shortness of breath.    Cardiovascular:  Negative for chest pain, palpitations and leg swelling.   Gastrointestinal:  Negative for abdominal pain, constipation, diarrhea, nausea and vomiting.   Genitourinary:  Positive for vaginal pain. Negative for difficulty urinating, dysuria, flank pain, frequency, hematuria and urgency.   Musculoskeletal:  Negative for back pain and gait  "problem.   Skin:  Negative for wound.   Allergic/Immunologic: Negative for immunocompromised state.   Neurological:  Negative for dizziness and syncope.   Hematological:  Does not bruise/bleed easily.   Psychiatric/Behavioral:  Negative for confusion.    All other systems reviewed and are negative.         Objective   /86 (Patient Position: Sitting, Cuff Size: Standard)   Pulse 69   Temp 98.7 °F (37.1 °C) (Temporal)   Ht 4' 10\" (1.473 m)   Wt 104 kg (230 lb)   SpO2 97%   BMI 48.07 kg/m²     Physical Exam  Constitutional:       Appearance: Normal appearance.   HENT:      Head: Normocephalic.      Nose: Nose normal.      Mouth/Throat:      Pharynx: Oropharynx is clear.   Eyes:      Pupils: Pupils are equal, round, and reactive to light.   Pulmonary:      Effort: Pulmonary effort is normal.   Genitourinary:     Comments: No obvious prolapse on exam. Atrophic vaginitis seen. Irritation on left vaginal with small abrasion  Musculoskeletal:      Cervical back: Normal range of motion.   Skin:     General: Skin is warm.   Neurological:      General: No focal deficit present.      Mental Status: She is alert and oriented to person, place, and time. Mental status is at baseline.   Psychiatric:         Mood and Affect: Mood normal.         Behavior: Behavior normal.         Thought Content: Thought content normal.         Judgment: Judgment normal.           Results   No results found for: \"PSA\"  Lab Results   Component Value Date    GLUCOSE 107 09/28/2015    CALCIUM 9.8 02/21/2025     09/28/2015    K 4.1 02/21/2025    CO2 30 02/21/2025     02/21/2025    BUN 20 02/21/2025    CREATININE 0.66 02/21/2025     Lab Results   Component Value Date    WBC 5.63 02/14/2025    HGB 11.6 02/14/2025    HCT 35.5 02/14/2025    MCV 92 02/14/2025     (L) 02/14/2025       Office Urine Dip  Recent Results (from the past hour)   POCT urine dip    Collection Time: 05/06/25 10:22 AM   Result Value Ref Range    LEUKOCYTE " ESTERASE,UA -     NITRITE,UA -     SL AMB POCT UROBILINOGEN 0.2     POCT URINE PROTEIN -      PH,UA 5.0     BLOOD,UA -     SPECIFIC GRAVITY,UA 1.025     KETONES,UA -     BILIRUBIN,UA -     GLUCOSE, UA -      COLOR,UA Yellow     CLARITY,UA Clear    POCT Measure PVR    Collection Time: 05/06/25 10:31 AM   Result Value Ref Range    POST-VOID RESIDUAL VOLUME, ML POC 73 mL

## 2025-05-06 ENCOUNTER — OFFICE VISIT (OUTPATIENT)
Dept: UROLOGY | Facility: CLINIC | Age: 80
End: 2025-05-06
Payer: MEDICARE

## 2025-05-06 VITALS
WEIGHT: 230 LBS | HEART RATE: 69 BPM | OXYGEN SATURATION: 97 % | HEIGHT: 58 IN | BODY MASS INDEX: 48.28 KG/M2 | SYSTOLIC BLOOD PRESSURE: 144 MMHG | TEMPERATURE: 98.7 F | DIASTOLIC BLOOD PRESSURE: 86 MMHG

## 2025-05-06 DIAGNOSIS — N39.3 STRESS INCONTINENCE OF URINE: ICD-10-CM

## 2025-05-06 DIAGNOSIS — N39.0 URINARY TRACT INFECTION WITHOUT HEMATURIA, SITE UNSPECIFIED: Primary | ICD-10-CM

## 2025-05-06 LAB

## 2025-05-06 PROCEDURE — 81002 URINALYSIS NONAUTO W/O SCOPE: CPT | Performed by: PHYSICIAN ASSISTANT

## 2025-05-06 PROCEDURE — 51798 US URINE CAPACITY MEASURE: CPT | Performed by: PHYSICIAN ASSISTANT

## 2025-05-06 PROCEDURE — 99213 OFFICE O/P EST LOW 20 MIN: CPT | Performed by: PHYSICIAN ASSISTANT

## 2025-05-20 ENCOUNTER — APPOINTMENT (OUTPATIENT)
Age: 80
End: 2025-05-20
Attending: INTERNAL MEDICINE
Payer: MEDICARE

## 2025-05-20 DIAGNOSIS — E55.9 VITAMIN D DEFICIENCY: ICD-10-CM

## 2025-05-20 DIAGNOSIS — R73.03 PREDIABETES: Chronic | ICD-10-CM

## 2025-05-20 DIAGNOSIS — E78.5 BORDERLINE HYPERLIPIDEMIA: ICD-10-CM

## 2025-05-20 LAB
25(OH)D3 SERPL-MCNC: 22.1 NG/ML (ref 30–100)
ANION GAP SERPL CALCULATED.3IONS-SCNC: 9 MMOL/L (ref 4–13)
BUN SERPL-MCNC: 18 MG/DL (ref 5–25)
CALCIUM SERPL-MCNC: 9.2 MG/DL (ref 8.4–10.2)
CHLORIDE SERPL-SCNC: 106 MMOL/L (ref 96–108)
CHOLEST SERPL-MCNC: 182 MG/DL (ref ?–200)
CO2 SERPL-SCNC: 27 MMOL/L (ref 21–32)
CREAT SERPL-MCNC: 0.71 MG/DL (ref 0.6–1.3)
ERYTHROCYTE [DISTWIDTH] IN BLOOD BY AUTOMATED COUNT: 13.1 % (ref 11.6–15.1)
EST. AVERAGE GLUCOSE BLD GHB EST-MCNC: 128 MG/DL
GFR SERPL CREATININE-BSD FRML MDRD: 80 ML/MIN/1.73SQ M
GLUCOSE P FAST SERPL-MCNC: 113 MG/DL (ref 65–99)
HBA1C MFR BLD: 6.1 %
HCT VFR BLD AUTO: 41.9 % (ref 34.8–46.1)
HDLC SERPL-MCNC: 52 MG/DL
HGB BLD-MCNC: 14.3 G/DL (ref 11.5–15.4)
LDLC SERPL CALC-MCNC: 100 MG/DL (ref 0–100)
MCH RBC QN AUTO: 30.4 PG (ref 26.8–34.3)
MCHC RBC AUTO-ENTMCNC: 34.1 G/DL (ref 31.4–37.4)
MCV RBC AUTO: 89 FL (ref 82–98)
PLATELET # BLD AUTO: 141 THOUSANDS/UL (ref 149–390)
PMV BLD AUTO: 11.4 FL (ref 8.9–12.7)
POTASSIUM SERPL-SCNC: 4 MMOL/L (ref 3.5–5.3)
RBC # BLD AUTO: 4.71 MILLION/UL (ref 3.81–5.12)
SODIUM SERPL-SCNC: 142 MMOL/L (ref 135–147)
TRIGL SERPL-MCNC: 150 MG/DL (ref ?–150)
WBC # BLD AUTO: 5.53 THOUSAND/UL (ref 4.31–10.16)

## 2025-05-20 PROCEDURE — 80061 LIPID PANEL: CPT

## 2025-05-20 PROCEDURE — 85027 COMPLETE CBC AUTOMATED: CPT

## 2025-05-20 PROCEDURE — 83036 HEMOGLOBIN GLYCOSYLATED A1C: CPT

## 2025-05-20 PROCEDURE — 82306 VITAMIN D 25 HYDROXY: CPT

## 2025-05-20 PROCEDURE — 80048 BASIC METABOLIC PNL TOTAL CA: CPT

## 2025-05-20 PROCEDURE — 36415 COLL VENOUS BLD VENIPUNCTURE: CPT

## 2025-05-22 ENCOUNTER — OFFICE VISIT (OUTPATIENT)
Age: 80
End: 2025-05-22
Payer: MEDICARE

## 2025-05-22 VITALS
HEIGHT: 58 IN | DIASTOLIC BLOOD PRESSURE: 70 MMHG | SYSTOLIC BLOOD PRESSURE: 112 MMHG | TEMPERATURE: 96.2 F | HEART RATE: 65 BPM | OXYGEN SATURATION: 95 % | WEIGHT: 230.8 LBS | BODY MASS INDEX: 48.45 KG/M2

## 2025-05-22 DIAGNOSIS — Z00.00 MEDICARE ANNUAL WELLNESS VISIT, SUBSEQUENT: ICD-10-CM

## 2025-05-22 DIAGNOSIS — E55.9 VITAMIN D DEFICIENCY: ICD-10-CM

## 2025-05-22 DIAGNOSIS — K76.0 METABOLIC DYSFUNCTION-ASSOCIATED STEATOTIC LIVER DISEASE (MASLD): ICD-10-CM

## 2025-05-22 DIAGNOSIS — D69.6 THROMBOCYTOPENIA (HCC): Primary | ICD-10-CM

## 2025-05-22 DIAGNOSIS — R73.03 PREDIABETES: Chronic | ICD-10-CM

## 2025-05-22 DIAGNOSIS — E78.5 BORDERLINE HYPERLIPIDEMIA: ICD-10-CM

## 2025-05-22 DIAGNOSIS — M85.89 OSTEOPENIA OF MULTIPLE SITES: Chronic | ICD-10-CM

## 2025-05-22 PROBLEM — K56.609 SMALL BOWEL OBSTRUCTION (HCC): Status: RESOLVED | Noted: 2021-05-06 | Resolved: 2025-05-22

## 2025-05-22 PROCEDURE — 99214 OFFICE O/P EST MOD 30 MIN: CPT | Performed by: INTERNAL MEDICINE

## 2025-05-22 PROCEDURE — G0439 PPPS, SUBSEQ VISIT: HCPCS | Performed by: INTERNAL MEDICINE

## 2025-05-22 PROCEDURE — G2211 COMPLEX E/M VISIT ADD ON: HCPCS | Performed by: INTERNAL MEDICINE

## 2025-05-22 NOTE — ASSESSMENT & PLAN NOTE
Stable from previous. Heart healthy diet advised. Exercise is limited by her knee problems.    Orders:  •  CBC; Future  •  Lipid Panel with Direct LDL reflex; Future  •  Comprehensive metabolic panel; Future

## 2025-05-22 NOTE — ASSESSMENT & PLAN NOTE
Work on diet and lifestyle changes. Continue to monitor A1c.      Orders:  •  Hemoglobin A1C; Future

## 2025-05-22 NOTE — PATIENT INSTRUCTIONS
Medicare Preventive Visit Patient Instructions  Thank you for completing your Welcome to Medicare Visit or Medicare Annual Wellness Visit today. Your next wellness visit will be due in one year (5/23/2026).  The screening/preventive services that you may require over the next 5-10 years are detailed below. Some tests may not apply to you based off risk factors and/or age. Screening tests ordered at today's visit but not completed yet may show as past due. Also, please note that scanned in results may not display below.  Preventive Screenings:  Service Recommendations Previous Testing/Comments   Colorectal Cancer Screening  * Colonoscopy    * Fecal Occult Blood Test (FOBT)/Fecal Immunochemical Test (FIT)  * Fecal DNA/Cologuard Test  * Flexible Sigmoidoscopy Age: 45-75 years old   Colonoscopy: every 10 years (may be performed more frequently if at higher risk)  OR  FOBT/FIT: every 1 year  OR  Cologuard: every 3 years  OR  Sigmoidoscopy: every 5 years  Screening may be recommended earlier than age 45 if at higher risk for colorectal cancer. Also, an individualized decision between you and your healthcare provider will decide whether screening between the ages of 76-85 would be appropriate. Colonoscopy: 01/18/2017  FOBT/FIT: Not on file  Cologuard: Not on file  Sigmoidoscopy: Not on file    Screening Not Indicated     Breast Cancer Screening Age: 40+ years old  Frequency: every 1-2 years  Not required if history of left and right mastectomy Mammogram: 05/29/2024    History Breast Cancer   Cervical Cancer Screening Between the ages of 21-29, pap smear recommended once every 3 years.   Between the ages of 30-65, can perform pap smear with HPV co-testing every 5 years.   Recommendations may differ for women with a history of total hysterectomy, cervical cancer, or abnormal pap smears in past. Pap Smear: 11/05/2024    Screening Not Indicated   Hepatitis C Screening Once for adults born between 1945 and 1965  More frequently  in patients at high risk for Hepatitis C Hep C Antibody: 02/23/2021    Screening Current   Diabetes Screening 1-2 times per year if you're at risk for diabetes or have pre-diabetes Fasting glucose: 113 mg/dL (5/20/2025)  A1C: 6.1 % (5/20/2025)  Screening Current   Cholesterol Screening Once every 5 years if you don't have a lipid disorder. May order more often based on risk factors. Lipid panel: 05/20/2025    Screening Not Indicated  History Lipid Disorder     Other Preventive Screenings Covered by Medicare:  Abdominal Aortic Aneurysm (AAA) Screening: covered once if your at risk. You're considered to be at risk if you have a family history of AAA.  Lung Cancer Screening: covers low dose CT scan once per year if you meet all of the following conditions: (1) Age 55-77; (2) No signs or symptoms of lung cancer; (3) Current smoker or have quit smoking within the last 15 years; (4) You have a tobacco smoking history of at least 20 pack years (packs per day multiplied by number of years you smoked); (5) You get a written order from a healthcare provider.  Glaucoma Screening: covered annually if you're considered high risk: (1) You have diabetes OR (2) Family history of glaucoma OR (3)  aged 50 and older OR (4)  American aged 65 and older  Osteoporosis Screening: covered every 2 years if you meet one of the following conditions: (1) You're estrogen deficient and at risk for osteoporosis based off medical history and other findings; (2) Have a vertebral abnormality; (3) On glucocorticoid therapy for more than 3 months; (4) Have primary hyperparathyroidism; (5) On osteoporosis medications and need to assess response to drug therapy.   Last bone density test (DXA Scan): 05/15/2018.  HIV Screening: covered annually if you're between the age of 15-65. Also covered annually if you are younger than 15 and older than 65 with risk factors for HIV infection. For pregnant patients, it is covered up to 3 times  per pregnancy.    Immunizations:  Immunization Recommendations   Influenza Vaccine Annual influenza vaccination during flu season is recommended for all persons aged >= 6 months who do not have contraindications   Pneumococcal Vaccine   * Pneumococcal conjugate vaccine = PCV13 (Prevnar 13), PCV15 (Vaxneuvance), PCV20 (Prevnar 20)  * Pneumococcal polysaccharide vaccine = PPSV23 (Pneumovax) Adults 19-65 yo with certain risk factors or if 65+ yo  If never received any pneumonia vaccine: recommend Prevnar 20 (PCV20)  Give PCV20 if previously received 1 dose of PCV13 or PPSV23   Hepatitis B Vaccine 3 dose series if at intermediate or high risk (ex: diabetes, end stage renal disease, liver disease)   Respiratory syncytial virus (RSV) Vaccine - COVERED BY MEDICARE PART D  * RSVPreF3 (Arexvy) CDC recommends that adults 60 years of age and older may receive a single dose of RSV vaccine using shared clinical decision-making (SCDM)   Tetanus (Td) Vaccine - COST NOT COVERED BY MEDICARE PART B Following completion of primary series, a booster dose should be given every 10 years to maintain immunity against tetanus. Td may also be given as tetanus wound prophylaxis.   Tdap Vaccine - COST NOT COVERED BY MEDICARE PART B Recommended at least once for all adults. For pregnant patients, recommended with each pregnancy.   Shingles Vaccine (Shingrix) - COST NOT COVERED BY MEDICARE PART B  2 shot series recommended in those 19 years and older who have or will have weakened immune systems or those 50 years and older     Health Maintenance Due:      Topic Date Due    DXA SCAN  05/15/2020    Hepatitis C Screening  Completed    Colorectal Cancer Screening  Discontinued     Immunizations Due:      Topic Date Due    COVID-19 Vaccine (1 - 2024-25 season) Never done     Advance Directives   What are advance directives?  Advance directives are legal documents that state your wishes and plans for medical care. These plans are made ahead of time in  case you lose your ability to make decisions for yourself. Advance directives can apply to any medical decision, such as the treatments you want, and if you want to donate organs.   What are the types of advance directives?  There are many types of advance directives, and each state has rules about how to use them. You may choose a combination of any of the following:  Living will:  This is a written record of the treatment you want. You can also choose which treatments you do not want, which to limit, and which to stop at a certain time. This includes surgery, medicine, IV fluid, and tube feedings.   Durable power of  for healthcare (DPAHC):  This is a written record that states who you want to make healthcare choices for you when you are unable to make them for yourself. This person, called a proxy, is usually a family member or a friend. You may choose more than 1 proxy.  Do not resuscitate (DNR) order:  A DNR order is used in case your heart stops beating or you stop breathing. It is a request not to have certain forms of treatment, such as CPR. A DNR order may be included in other types of advance directives.  Medical directive:  This covers the care that you want if you are in a coma, near death, or unable to make decisions for yourself. You can list the treatments you want for each condition. Treatment may include pain medicine, surgery, blood transfusions, dialysis, IV or tube feedings, and a ventilator (breathing machine).  Values history:  This document has questions about your views, beliefs, and how you feel and think about life. This information can help others choose the care that you would choose.  Why are advance directives important?  An advance directive helps you control your care. Although spoken wishes may be used, it is better to have your wishes written down. Spoken wishes can be misunderstood, or not followed. Treatments may be given even if you do not want them. An advance directive  may make it easier for your family to make difficult choices about your care.   Urinary Incontinence   Urinary incontinence (UI)  is when you lose control of your bladder. UI develops because your bladder cannot store or empty urine properly. The 3 most common types of UI are stress incontinence, urge incontinence, or both.  Medicines:   May be given to help strengthen your bladder control. Report any side effects of medication to your healthcare provider.  Do pelvic muscle exercises often:  Your pelvic muscles help you stop urinating. Squeeze these muscles tight for 5 seconds, then relax for 5 seconds. Gradually work up to squeezing for 10 seconds. Do 3 sets of 15 repetitions a day, or as directed. This will help strengthen your pelvic muscles and improve bladder control.  Train your bladder:  Go to the bathroom at set times, such as every 2 hours, even if you do not feel the urge to go. You can also try to hold your urine when you feel the urge to go. For example, hold your urine for 5 minutes when you feel the urge to go. As that becomes easier, hold your urine for 10 minutes.   Self-care:   Keep a UI record.  Write down how often you leak urine and how much you leak. Make a note of what you were doing when you leaked urine.  Drink liquids as directed. You may need to limit the amount of liquid you drink to help control your urine leakage. Do not drink any liquid right before you go to bed. Limit or do not have drinks that contain caffeine or alcohol.   Prevent constipation.  Eat a variety of high-fiber foods. Good examples are high-fiber cereals, beans, vegetables, and whole-grain breads. Walking is the best way to trigger your intestines to have a bowel movement.  Exercise regularly and maintain a healthy weight.  Weight loss and exercise will decrease pressure on your bladder and help you control your leakage.   Use a catheter as directed  to help empty your bladder. A catheter is a tiny, plastic tube that is  put into your bladder to drain your urine.   Go to behavior therapy as directed.  Behavior therapy may be used to help you learn to control your urge to urinate.    Weight Management   Why it is important to manage your weight:  Being overweight increases your risk of health conditions such as heart disease, high blood pressure, type 2 diabetes, and certain types of cancer. It can also increase your risk for osteoarthritis, sleep apnea, and other respiratory problems. Aim for a slow, steady weight loss. Even a small amount of weight loss can lower your risk of health problems.  How to lose weight safely:  A safe and healthy way to lose weight is to eat fewer calories and get regular exercise. You can lose up about 1 pound a week by decreasing the number of calories you eat by 500 calories each day.   Healthy meal plan for weight management:  A healthy meal plan includes a variety of foods, contains fewer calories, and helps you stay healthy. A healthy meal plan includes the following:  Eat whole-grain foods more often.  A healthy meal plan should contain fiber. Fiber is the part of grains, fruits, and vegetables that is not broken down by your body. Whole-grain foods are healthy and provide extra fiber in your diet. Some examples of whole-grain foods are whole-wheat breads and pastas, oatmeal, brown rice, and bulgur.  Eat a variety of vegetables every day.  Include dark, leafy greens such as spinach, kale, kailyn greens, and mustard greens. Eat yellow and orange vegetables such as carrots, sweet potatoes, and winter squash.   Eat a variety of fruits every day.  Choose fresh or canned fruit (canned in its own juice or light syrup) instead of juice. Fruit juice has very little or no fiber.  Eat low-fat dairy foods.  Drink fat-free (skim) milk or 1% milk. Eat fat-free yogurt and low-fat cottage cheese. Try low-fat cheeses such as mozzarella and other reduced-fat cheeses.  Choose meat and other protein foods that are  low in fat.  Choose beans or other legumes such as split peas or lentils. Choose fish, skinless poultry (chicken or turkey), or lean cuts of red meat (beef or pork). Before you cook meat or poultry, cut off any visible fat.   Use less fat and oil.  Try baking foods instead of frying them. Add less fat, such as margarine, sour cream, regular salad dressing and mayonnaise to foods. Eat fewer high-fat foods. Some examples of high-fat foods include french fries, doughnuts, ice cream, and cakes.  Eat fewer sweets.  Limit foods and drinks that are high in sugar. This includes candy, cookies, regular soda, and sweetened drinks.  Exercise:  Exercise at least 30 minutes per day on most days of the week. Some examples of exercise include walking, biking, dancing, and swimming. You can also fit in more physical activity by taking the stairs instead of the elevator or parking farther away from stores. Ask your healthcare provider about the best exercise plan for you.    © Copyright [employer] 2018 Information is for End User's use only and may not be sold, redistributed or otherwise used for commercial purposes. All illustrations and images included in CareNotes® are the copyrighted property of [employer] or [employer]

## 2025-06-09 ENCOUNTER — APPOINTMENT (OUTPATIENT)
Age: 80
End: 2025-06-09
Attending: INTERNAL MEDICINE
Payer: MEDICARE

## 2025-06-09 ENCOUNTER — OFFICE VISIT (OUTPATIENT)
Age: 80
End: 2025-06-09
Payer: MEDICARE

## 2025-06-09 VITALS
WEIGHT: 233.6 LBS | TEMPERATURE: 97.3 F | HEART RATE: 73 BPM | SYSTOLIC BLOOD PRESSURE: 122 MMHG | OXYGEN SATURATION: 96 % | BODY MASS INDEX: 49.04 KG/M2 | HEIGHT: 58 IN | DIASTOLIC BLOOD PRESSURE: 78 MMHG | RESPIRATION RATE: 18 BRPM

## 2025-06-09 DIAGNOSIS — R39.9 UTI SYMPTOMS: ICD-10-CM

## 2025-06-09 DIAGNOSIS — R39.9 UTI SYMPTOMS: Primary | ICD-10-CM

## 2025-06-09 PROCEDURE — 81001 URINALYSIS AUTO W/SCOPE: CPT

## 2025-06-09 PROCEDURE — G2211 COMPLEX E/M VISIT ADD ON: HCPCS | Performed by: INTERNAL MEDICINE

## 2025-06-09 PROCEDURE — 99214 OFFICE O/P EST MOD 30 MIN: CPT | Performed by: INTERNAL MEDICINE

## 2025-06-09 RX ORDER — CEPHALEXIN 500 MG/1
500 CAPSULE ORAL EVERY 12 HOURS SCHEDULED
Qty: 14 CAPSULE | Refills: 0 | Status: SHIPPED | OUTPATIENT
Start: 2025-06-09 | End: 2025-06-16

## 2025-06-09 NOTE — PROGRESS NOTES
"Name: Yi Foy      : 1945      MRN: [id number]  Encounter Provider: Garry Gama DO  Encounter Date: 2025   Encounter department: [facility] [place]    :  Assessment & Plan  UTI symptoms    Reviewed prior urine culture. Will empirically treat with cephalexin. Check UA.    Orders:  •  UA w Reflex to Microscopic w Reflex to Culture; Future  •  cephalexin (KEFLEX) 500 mg capsule; Take 1 capsule (500 mg total) by mouth every 12 (twelve) hours for 7 days           History of Present Illness     History of Present Illness  The patient presents for evaluation of urinary tract infection and left foot pain.    She reports a recurrence of urinary tract infection (UTI) symptoms, including dysuria and increased urinary frequency, but no fever. Six weeks ago, she experienced similar symptoms and suspects the use of corn starch powder for personal hygiene due to incontinence may have contributed to the UTI. She has procured test strips from a pharmacy and confirmed the presence of a UTI. She has previously consulted with a urologist at [facility].    She has been experiencing pain in her left foot, specifically in the arch area, which has significantly impaired her ability to walk. She has sought medical attention from a podiatrist and received a cortisone injection, which unfortunately exacerbated her symptoms. She has been managing the pain with anti-inflammatory cream and has resorted to wearing sandals as they provide some relief.     Review of Systems   Genitourinary:  Positive for dysuria, frequency and urgency.   Musculoskeletal:  Positive for arthralgias.     Objective   /78 (BP Location: Right arm, Patient Position: Sitting, Cuff Size: Large)   Pulse 73   Temp (!) 97.3 °F (36.3 °C) (Tympanic)   Resp 18   Ht 4' 10\" (1.473 m)   Wt 106 kg (233 lb 9.6 oz)   SpO2 96%   BMI 48.82 kg/m²     Physical Exam  Constitutional:       General: She is not in acute distress.     " Appearance: She is obese. She is not ill-appearing.     Cardiovascular:      Rate and Rhythm: Normal rate and regular rhythm.      Heart sounds: No murmur heard.  Pulmonary:      Effort: Pulmonary effort is normal. No respiratory distress.      Breath sounds: No wheezing.   Abdominal:      Tenderness: There is no right CVA tenderness or left CVA tenderness.     Neurological:      Mental Status: She is alert.       Garry [facility], DO

## 2025-06-10 LAB
BACTERIA UR QL AUTO: ABNORMAL /HPF
BILIRUB UR QL STRIP: NEGATIVE
BUDDING YEAST: PRESENT
CLARITY UR: CLEAR
COLOR UR: ABNORMAL
GLUCOSE UR STRIP-MCNC: NEGATIVE MG/DL
HGB UR QL STRIP.AUTO: NEGATIVE
KETONES UR STRIP-MCNC: NEGATIVE MG/DL
LEUKOCYTE ESTERASE UR QL STRIP: ABNORMAL
NITRITE UR QL STRIP: NEGATIVE
NON-SQ EPI CELLS URNS QL MICRO: ABNORMAL /HPF
PH UR STRIP.AUTO: 6 [PH]
PROT UR STRIP-MCNC: NEGATIVE MG/DL
RBC #/AREA URNS AUTO: ABNORMAL /HPF
SP GR UR STRIP.AUTO: 1.01 (ref 1–1.03)
UROBILINOGEN UR STRIP-ACNC: <2 MG/DL
WBC #/AREA URNS AUTO: ABNORMAL /HPF

## 2025-07-08 ENCOUNTER — TELEPHONE (OUTPATIENT)
Age: 80
End: 2025-07-08

## 2025-07-23 ENCOUNTER — OFFICE VISIT (OUTPATIENT)
Age: 80
End: 2025-07-23
Payer: MEDICARE

## 2025-07-23 ENCOUNTER — TELEPHONE (OUTPATIENT)
Age: 80
End: 2025-07-23

## 2025-07-23 ENCOUNTER — APPOINTMENT (OUTPATIENT)
Age: 80
End: 2025-07-23
Payer: MEDICARE

## 2025-07-23 VITALS
DIASTOLIC BLOOD PRESSURE: 72 MMHG | HEIGHT: 58 IN | SYSTOLIC BLOOD PRESSURE: 128 MMHG | OXYGEN SATURATION: 95 % | TEMPERATURE: 97.2 F | WEIGHT: 230.6 LBS | RESPIRATION RATE: 18 BRPM | HEART RATE: 61 BPM | BODY MASS INDEX: 48.41 KG/M2

## 2025-07-23 DIAGNOSIS — N89.8 VAGINAL ITCHING: ICD-10-CM

## 2025-07-23 DIAGNOSIS — R30.0 DYSURIA: ICD-10-CM

## 2025-07-23 DIAGNOSIS — R30.0 DYSURIA: Primary | ICD-10-CM

## 2025-07-23 LAB
BACTERIA UR QL AUTO: ABNORMAL /HPF
BILIRUB UR QL STRIP: NEGATIVE
CLARITY UR: ABNORMAL
COLOR UR: YELLOW
GLUCOSE UR STRIP-MCNC: NEGATIVE MG/DL
HGB UR QL STRIP.AUTO: ABNORMAL
KETONES UR STRIP-MCNC: NEGATIVE MG/DL
LEUKOCYTE ESTERASE UR QL STRIP: ABNORMAL
MUCOUS THREADS UR QL AUTO: ABNORMAL
NITRITE UR QL STRIP: NEGATIVE
NON-SQ EPI CELLS URNS QL MICRO: ABNORMAL /HPF
PH UR STRIP.AUTO: 6.5 [PH]
PROT UR STRIP-MCNC: NEGATIVE MG/DL
RBC #/AREA URNS AUTO: ABNORMAL /HPF
SP GR UR STRIP.AUTO: 1.02 (ref 1–1.03)
UROBILINOGEN UR STRIP-ACNC: <2 MG/DL
WBC #/AREA URNS AUTO: ABNORMAL /HPF

## 2025-07-23 PROCEDURE — 87186 SC STD MICRODIL/AGAR DIL: CPT

## 2025-07-23 PROCEDURE — G2211 COMPLEX E/M VISIT ADD ON: HCPCS

## 2025-07-23 PROCEDURE — 87086 URINE CULTURE/COLONY COUNT: CPT

## 2025-07-23 PROCEDURE — 87077 CULTURE AEROBIC IDENTIFY: CPT

## 2025-07-23 PROCEDURE — 81001 URINALYSIS AUTO W/SCOPE: CPT

## 2025-07-23 PROCEDURE — 99213 OFFICE O/P EST LOW 20 MIN: CPT

## 2025-07-23 RX ORDER — FLUCONAZOLE 150 MG/1
150 TABLET ORAL ONCE
Qty: 1 TABLET | Refills: 0 | Status: SHIPPED | OUTPATIENT
Start: 2025-07-23 | End: 2025-07-23

## 2025-07-23 RX ORDER — CEPHALEXIN 500 MG/1
500 CAPSULE ORAL 2 TIMES DAILY
Qty: 10 CAPSULE | Refills: 0 | Status: SHIPPED | OUTPATIENT
Start: 2025-07-23 | End: 2025-07-28

## 2025-07-23 NOTE — TELEPHONE ENCOUNTER
Dropped   iraidap  form  off  for   [facility]   to    sign    she  has  a  really  hard  time  walking     call  when  ready  to pick  up  form  in  [facility]   bin

## 2025-07-23 NOTE — PROGRESS NOTES
"Name: Yi Foy      : 1945      MRN: [id number]  Encounter Provider: Iesha Layton PA-C  Encounter Date: 2025   Encounter department: [facility]  :  Assessment & Plan  Dysuria  We will treat with a course of Keflex and follow-up with urinalysis.  We will call the patient if there is any need to change antibiotics based on culture results.  Keep follow-up with urology in August as scheduled for frequent UTIs.  Also encouraged the patient to call her gynecologist to schedule sooner appointment than November for evaluation for possible pelvic prolapse.  Orders:  •  UA w Reflex to Microscopic w Reflex to Culture; Future  •  cephalexin (KEFLEX) 500 mg capsule; Take 1 capsule (500 mg total) by mouth in the morning and 1 capsule (500 mg total) before bedtime. Do all this for 5 days.    Vaginal itching  Suspect possible vaginal yeast infection from recent antibiotic use.  Will treat with a course of Diflucan.  Follow-up if symptoms worsen or fail to improve.  Orders:  •  fluconazole (DIFLUCAN) 150 mg tablet; Take 1 tablet (150 mg total) by mouth once for 1 dose           History of Present Illness   Caro presents the office with a complaint of dysuria and vaginal itching for the past 4 days.  She has had frequent UTIs within the past year and had evaluation by urology in May.  Previous providers had some concern about possible pelvic prolapse as a contributing factor to her frequent UTIs.  She has another follow-up with urology early in August.  Recently she took Azo for the pain with urination, which helped.  She is also struggling with urinary frequency and urgency.  She is having itching of the genital region without any vaginal discharge.  She feels a \"pressure\" in her pelvic region      Review of Systems   Constitutional:  Negative for chills and fever.   HENT:  Negative for congestion, ear pain and sore throat.    Eyes:  Negative for pain and visual disturbance. " "  Respiratory:  Negative for cough and shortness of breath.    Cardiovascular:  Negative for chest pain and palpitations.   Gastrointestinal:  Negative for abdominal pain, constipation, diarrhea and vomiting.   Genitourinary:  Positive for dysuria, frequency and urgency. Negative for hematuria.   Musculoskeletal:  Negative for arthralgias, back pain and myalgias.   Skin:  Negative for color change and rash.   Neurological:  Negative for dizziness, seizures, syncope and headaches.   All other systems reviewed and are negative.      Objective   /72 (BP Location: Left arm, Patient Position: Sitting, Cuff Size: Large)   Pulse 61   Temp (!) 97.2 °F (36.2 °C) (Tympanic)   Resp 18   Ht 4' 10\" (1.473 m)   Wt 105 kg (230 lb 9.6 oz)   SpO2 95%   BMI 48.20 kg/m²      Physical Exam  Vitals and nursing note reviewed.   Constitutional:       General: She is not in acute distress.     Appearance: She is well-developed.   HENT:      Head: Normocephalic and atraumatic.     Eyes:      Extraocular Movements: Extraocular movements intact.      Conjunctiva/sclera: Conjunctivae normal.       Cardiovascular:      Rate and Rhythm: Normal rate and regular rhythm.      Heart sounds: Normal heart sounds. No murmur heard.     No friction rub. No gallop.   Pulmonary:      Effort: Pulmonary effort is normal. No respiratory distress.      Breath sounds: Normal breath sounds. No wheezing, rhonchi or rales.     Musculoskeletal:         General: No swelling.      Cervical back: Neck supple.     Skin:     General: Skin is warm and dry.      Capillary Refill: Capillary refill takes less than 2 seconds.     Neurological:      General: No focal deficit present.      Mental Status: She is alert.     Psychiatric:         Mood and Affect: Mood normal.         "

## 2025-07-25 LAB — BACTERIA UR CULT: ABNORMAL

## 2025-08-06 ENCOUNTER — OFFICE VISIT (OUTPATIENT)
Dept: UROLOGY | Facility: CLINIC | Age: 80
End: 2025-08-06
Payer: MEDICARE

## 2025-08-06 VITALS
TEMPERATURE: 97.8 F | SYSTOLIC BLOOD PRESSURE: 130 MMHG | DIASTOLIC BLOOD PRESSURE: 78 MMHG | HEIGHT: 58 IN | WEIGHT: 233 LBS | HEART RATE: 68 BPM | OXYGEN SATURATION: 99 % | BODY MASS INDEX: 48.91 KG/M2

## 2025-08-06 DIAGNOSIS — N39.3 STRESS INCONTINENCE OF URINE: Primary | ICD-10-CM

## 2025-08-06 DIAGNOSIS — N39.0 RECURRENT UTI: ICD-10-CM

## 2025-08-06 DIAGNOSIS — R35.1 NOCTURIA: ICD-10-CM

## 2025-08-06 LAB

## 2025-08-06 PROCEDURE — 99215 OFFICE O/P EST HI 40 MIN: CPT | Performed by: UROLOGY

## 2025-08-06 PROCEDURE — 81002 URINALYSIS NONAUTO W/O SCOPE: CPT | Performed by: UROLOGY

## 2025-08-06 PROCEDURE — 51798 US URINE CAPACITY MEASURE: CPT | Performed by: UROLOGY

## 2025-08-06 RX ORDER — FLUCONAZOLE 150 MG/1
1 TABLET ORAL DAILY
COMMUNITY
Start: 2025-07-23